# Patient Record
Sex: MALE | Race: BLACK OR AFRICAN AMERICAN | NOT HISPANIC OR LATINO | Employment: OTHER | URBAN - NONMETROPOLITAN AREA
[De-identification: names, ages, dates, MRNs, and addresses within clinical notes are randomized per-mention and may not be internally consistent; named-entity substitution may affect disease eponyms.]

---

## 2017-01-10 DIAGNOSIS — N40.1 BPH (BENIGN PROSTATIC HYPERTROPHY) WITH URINARY OBSTRUCTION: Primary | ICD-10-CM

## 2017-01-10 DIAGNOSIS — N13.8 BPH (BENIGN PROSTATIC HYPERTROPHY) WITH URINARY OBSTRUCTION: Primary | ICD-10-CM

## 2017-01-10 RX ORDER — FINASTERIDE 5 MG/1
5 TABLET, FILM COATED ORAL DAILY
Qty: 30 TABLET | Refills: 5 | Status: SHIPPED | OUTPATIENT
Start: 2017-01-10 | End: 2017-06-23 | Stop reason: SDUPTHER

## 2017-03-15 ENCOUNTER — OFFICE VISIT (OUTPATIENT)
Dept: CARDIOLOGY | Facility: CLINIC | Age: 82
End: 2017-03-15

## 2017-03-15 VITALS
SYSTOLIC BLOOD PRESSURE: 140 MMHG | HEIGHT: 71 IN | WEIGHT: 126 LBS | DIASTOLIC BLOOD PRESSURE: 82 MMHG | BODY MASS INDEX: 17.64 KG/M2 | HEART RATE: 63 BPM

## 2017-03-15 DIAGNOSIS — I10 ESSENTIAL HYPERTENSION: ICD-10-CM

## 2017-03-15 DIAGNOSIS — R01.1 CARDIAC MURMUR: ICD-10-CM

## 2017-03-15 DIAGNOSIS — I34.1 MITRAL VALVE PROLAPSE: Primary | ICD-10-CM

## 2017-03-15 DIAGNOSIS — I34.0 NON-RHEUMATIC MITRAL REGURGITATION: ICD-10-CM

## 2017-03-15 PROCEDURE — 93000 ELECTROCARDIOGRAM COMPLETE: CPT | Performed by: NURSE PRACTITIONER

## 2017-03-15 PROCEDURE — 99214 OFFICE O/P EST MOD 30 MIN: CPT | Performed by: NURSE PRACTITIONER

## 2017-03-15 NOTE — PROGRESS NOTES
Subjective:     Encounter Date:03/15/2017      Patient ID: Roman Kenney is a 82 y.o. male.  He presents today for follow up of Mitral Valve Prolapse and Mitral Valve Regurgitation.  He has a history of HTN and a murmur.  He denies CP, shortness of breath, orthopnea, dyspnea with exertion, dizziness, syncope, fatigue, palpitations, decreased exercise tolerance or swelling.  He states that his BP is managed by his PCP and is reported to be well controlled.  He states that overall he has felt well since his last visit.      Chief Complaint:  Heart Problem   This is a chronic (MVP and MR) problem. The current episode started more than 1 year ago. The problem has been unchanged. Pertinent negatives include no abdominal pain, anorexia, arthralgias, change in bowel habit, chest pain, chills, congestion, coughing, diaphoresis, fatigue, fever, headaches, joint swelling, myalgias, nausea, neck pain, numbness, rash, sore throat, swollen glands, urinary symptoms, vertigo, visual change, vomiting or weakness.   Hypertension   This is a chronic problem. The current episode started more than 1 year ago. The problem is controlled. Pertinent negatives include no anxiety, blurred vision, chest pain, headaches, malaise/fatigue, neck pain, orthopnea, palpitations, peripheral edema, PND, shortness of breath or sweats. Risk factors for coronary artery disease include male gender. Past treatments include ACE inhibitors. The current treatment provides significant improvement.   Heart Murmur   This is a chronic problem. The current episode started more than 1 year ago. The problem has been unchanged. Pertinent negatives include no abdominal pain, anorexia, arthralgias, change in bowel habit, chest pain, chills, congestion, coughing, diaphoresis, fatigue, fever, headaches, joint swelling, myalgias, nausea, neck pain, numbness, rash, sore throat, swollen glands, urinary symptoms, vertigo, visual change, vomiting or weakness.        The following portions of the patient's history were reviewed and updated as appropriate: allergies, current medications, past family history, past medical history, past social history, past surgical history and problem list.     No Known Allergies    Current Outpatient Prescriptions:   •  finasteride (PROSCAR) 5 MG tablet, Take 1 tablet by mouth Daily., Disp: 30 tablet, Rfl: 5  •  lisinopril (PRINIVIL,ZESTRIL) 10 MG tablet, TK 1 T PO D, Disp: , Rfl: 5  •  tamsulosin (FLOMAX) 0.4 MG capsule 24 hr capsule, TAKE 1 CAPSULE BY MOUTH EVERY DAY WITH THE SAME MEAL EACH DAY, Disp: 30 capsule, Rfl: 6  Past Medical History   Diagnosis Date   • BPH (benign prostatic hypertrophy)    • Hypertension    • Mitral valve regurgitation    • Undiagnosed cardiac murmurs    • Weight loss      Past Surgical History   Procedure Laterality Date   • Finger surgery Right    • Colonoscopy       Family History   Problem Relation Age of Onset   • No Known Problems Mother    • Cancer Father    • Cancer Sister    • No Known Problems Brother    • No Known Problems Sister    • No Known Problems Sister    • No Known Problems Sister      Social History     Social History   • Marital status:      Spouse name: N/A   • Number of children: N/A   • Years of education: N/A     Occupational History   • Not on file.     Social History Main Topics   • Smoking status: Never Smoker   • Smokeless tobacco: Never Used   • Alcohol use No   • Drug use: No   • Sexual activity: Defer     Other Topics Concern   • Not on file     Social History Narrative         Review of Systems   Constitution: Negative for chills, decreased appetite, diaphoresis, fatigue, fever, weakness, malaise/fatigue, night sweats, weight gain and weight loss.   HENT: Negative for congestion, headaches, nosebleeds and sore throat.    Eyes: Negative for blurred vision and visual disturbance.   Cardiovascular: Negative for chest pain, dyspnea on exertion, leg swelling, near-syncope,  "orthopnea, palpitations, paroxysmal nocturnal dyspnea and syncope.   Respiratory: Negative for cough, hemoptysis, shortness of breath, snoring and wheezing.    Endocrine: Negative for cold intolerance and heat intolerance.   Hematologic/Lymphatic: Does not bruise/bleed easily.   Skin: Negative for rash.   Musculoskeletal: Negative for arthralgias, back pain, falls, joint swelling, myalgias and neck pain.   Gastrointestinal: Negative for abdominal pain, anorexia, change in bowel habit, constipation, diarrhea, dysphagia, heartburn, nausea and vomiting.   Genitourinary: Negative for hematuria.   Neurological: Negative for dizziness, light-headedness, numbness and vertigo.   Psychiatric/Behavioral: Negative for altered mental status.   Allergic/Immunologic: Negative for persistent infections.         ECG 12 Lead  Date/Time: 3/15/2017 10:10 AM  Performed by: REGAN MADERA  Authorized by: REGAN MADERA   Comparison: compared with previous ECG from 3/7/2016  Similar to previous ECG  Rhythm: sinus bradycardia  Rate: bradycardic  BPM: 47  Clinical impression: abnormal ECG          Visit Vitals   • /82 (BP Location: Right arm, Patient Position: Sitting, Cuff Size: Adult)   • Pulse 63   • Ht 71\" (180.3 cm)   • Wt 126 lb (57.2 kg)   • BMI 17.57 kg/m2          Objective:     Physical Exam   Constitutional: He is oriented to person, place, and time. Vital signs are normal. He appears well-developed and well-nourished. No distress.   HENT:   Head: Normocephalic and atraumatic.   Right Ear: External ear normal.   Left Ear: External ear normal.   Eyes: Conjunctivae are normal. Pupils are equal, round, and reactive to light. Right eye exhibits no discharge. Left eye exhibits no discharge.   Neck: Normal range of motion. Neck supple. No JVD present. Carotid bruit is not present. No thyromegaly present.   Cardiovascular: Normal rate, regular rhythm and intact distal pulses.  PMI is not displaced.  Exam reveals no gallop, no " friction rub and no decreased pulses.    Murmur heard.  High-pitched blowing mid to late systolic murmur is present with a grade of 3/6  at the apex  Pulses:       Radial pulses are 2+ on the right side, and 2+ on the left side.        Dorsalis pedis pulses are 2+ on the right side, and 2+ on the left side.        Posterior tibial pulses are 2+ on the right side, and 2+ on the left side.   Pulmonary/Chest: Effort normal. No respiratory distress. He has decreased breath sounds in the right middle field, the right lower field, the left middle field and the left lower field. He has no wheezes. He has no rhonchi. He has no rales. He exhibits no tenderness.   Abdominal: Soft. Bowel sounds are normal. He exhibits no distension. There is no tenderness.   Musculoskeletal: Normal range of motion. He exhibits no edema.   Neurological: He is alert and oriented to person, place, and time.   Skin: Skin is warm and dry. No rash noted. He is not diaphoretic. No erythema. No pallor.   Psychiatric: He has a normal mood and affect. His behavior is normal. Judgment and thought content normal.         Assessment:          Diagnosis Plan   1. Mitral valve prolapse  Adult Transthoracic Echo Complete With Contrast in one year.    2. Non-rheumatic mitral regurgitation  Follow up with Dr. Burleson on one year following 2D echo.    3. Essential hypertension  Well controlled.    4. Cardiac murmur  Adult Transthoracic Echo Complete With Contrast          Plan:       1. Continue all medications as previously prescribed.  2. 2D echo in one year.  3. Follow up with PCP for BP management and routine lab work.  4. Report any CP, shortness of breath, dizziness, syncope, decreased exercise tolerance, palpitations, fatigue or swelling.  5. Follow up with Dr. Burleson in one year following 2D echo, or sooner if needed.

## 2017-03-22 ENCOUNTER — OFFICE VISIT (OUTPATIENT)
Dept: FAMILY MEDICINE CLINIC | Facility: CLINIC | Age: 82
End: 2017-03-22

## 2017-03-22 VITALS
WEIGHT: 121 LBS | HEIGHT: 71 IN | BODY MASS INDEX: 16.94 KG/M2 | RESPIRATION RATE: 16 BRPM | SYSTOLIC BLOOD PRESSURE: 108 MMHG | DIASTOLIC BLOOD PRESSURE: 62 MMHG | OXYGEN SATURATION: 96 % | HEART RATE: 67 BPM

## 2017-03-22 DIAGNOSIS — N40.0 BENIGN NON-NODULAR PROSTATIC HYPERPLASIA WITHOUT LOWER URINARY TRACT SYMPTOMS: ICD-10-CM

## 2017-03-22 DIAGNOSIS — I34.0 NON-RHEUMATIC MITRAL REGURGITATION: ICD-10-CM

## 2017-03-22 DIAGNOSIS — I10 ESSENTIAL HYPERTENSION: Primary | ICD-10-CM

## 2017-03-22 PROCEDURE — 99213 OFFICE O/P EST LOW 20 MIN: CPT | Performed by: FAMILY MEDICINE

## 2017-03-22 NOTE — PROGRESS NOTES
"Subjective   Roman Kenney is a 82 y.o. male.     Chief Complaint   Patient presents with   • Follow-up       History of Present Illness     he notes having good bp control..he recently saw dr ford to hve his \"heart valve\"check ed out..his urination is stable ..      Current Outpatient Prescriptions:   •  finasteride (PROSCAR) 5 MG tablet, Take 1 tablet by mouth Daily., Disp: 30 tablet, Rfl: 5  •  lisinopril (PRINIVIL,ZESTRIL) 10 MG tablet, TK 1 T PO D, Disp: , Rfl: 5  •  tamsulosin (FLOMAX) 0.4 MG capsule 24 hr capsule, TAKE 1 CAPSULE BY MOUTH EVERY DAY WITH THE SAME MEAL EACH DAY, Disp: 30 capsule, Rfl: 6  No Known Allergies    Past Medical History:   Diagnosis Date   • BPH (benign prostatic hypertrophy)    • Hypertension    • Mitral valve regurgitation    • Undiagnosed cardiac murmurs    • Weight loss      Past Surgical History:   Procedure Laterality Date   • COLONOSCOPY     • FINGER SURGERY Right        Review of Systems   Constitutional: Negative.    Eyes: Negative.    Respiratory: Negative.    Cardiovascular: Negative.    Gastrointestinal: Negative.    Endocrine: Negative.    Genitourinary: Positive for decreased urine volume.   Musculoskeletal: Negative.    Skin: Negative.    Allergic/Immunologic: Negative.    Neurological: Negative.    Hematological: Negative.        Objective  /62  Pulse 67  Resp 16  Ht 71\" (180.3 cm)  Wt 121 lb (54.9 kg)  SpO2 96%  BMI 16.88 kg/m2  Physical Exam   Constitutional: He is oriented to person, place, and time. He appears well-developed and well-nourished.   HENT:   Head: Normocephalic and atraumatic.   Right Ear: External ear normal.   Left Ear: External ear normal.   Nose: Nose normal.   Mouth/Throat: Oropharynx is clear and moist.   Eyes: Conjunctivae and EOM are normal. Pupils are equal, round, and reactive to light.   Neck: Normal range of motion. Neck supple.   Cardiovascular: Normal rate, regular rhythm and intact distal pulses.    Murmur " heard.  Pulmonary/Chest: Effort normal and breath sounds normal.   Abdominal: Soft. Bowel sounds are normal.   Musculoskeletal: Normal range of motion.   Neurological: He is alert and oriented to person, place, and time. He has normal reflexes.   Skin: Skin is warm and dry.   Psychiatric: He has a normal mood and affect. His behavior is normal. Judgment and thought content normal.   Nursing note and vitals reviewed.      Assessment/Plan   Roman was seen today for follow-up.    Diagnoses and all orders for this visit:    Essential hypertension  -     CBC w AUTO Differential  -     Comprehensive Metabolic Panel    Benign non-nodular prostatic hyperplasia without lower urinary tract symptoms    Non-rheumatic mitral regurgitation                  Orders Placed This Encounter   Procedures   • Comprehensive Metabolic Panel       Follow up: 6 month(s)

## 2017-03-23 LAB
ALBUMIN SERPL-MCNC: 4.1 G/DL (ref 3.5–5)
ALBUMIN/GLOB SERPL: 1.3 G/DL (ref 1.1–2.5)
ALP SERPL-CCNC: 107 U/L (ref 24–120)
ALT SERPL-CCNC: 24 U/L (ref 0–54)
AST SERPL-CCNC: 24 U/L (ref 7–45)
BASOPHILS # BLD AUTO: 0.03 10*3/MM3 (ref 0–0.2)
BASOPHILS NFR BLD AUTO: 0.4 % (ref 0–2)
BILIRUB SERPL-MCNC: 0.6 MG/DL (ref 0.1–1)
BUN SERPL-MCNC: 19 MG/DL (ref 5–21)
BUN/CREAT SERPL: 13.8 (ref 7–25)
CALCIUM SERPL-MCNC: 9.9 MG/DL (ref 8.4–10.4)
CHLORIDE SERPL-SCNC: 106 MMOL/L (ref 98–110)
CO2 SERPL-SCNC: 27 MMOL/L (ref 24–31)
CREAT SERPL-MCNC: 1.38 MG/DL (ref 0.5–1.4)
EOSINOPHIL # BLD AUTO: 0.17 10*3/MM3 (ref 0–0.7)
EOSINOPHIL NFR BLD AUTO: 2.3 % (ref 0–4)
ERYTHROCYTE [DISTWIDTH] IN BLOOD BY AUTOMATED COUNT: 14.3 % (ref 12–15)
GLOBULIN SER CALC-MCNC: 3.2 GM/DL
GLUCOSE SERPL-MCNC: 87 MG/DL (ref 70–100)
HCT VFR BLD AUTO: 38.3 % (ref 40–52)
HGB BLD-MCNC: 12.2 G/DL (ref 14–18)
IMM GRANULOCYTES # BLD: 0.02 10*3/MM3 (ref 0–0.03)
IMM GRANULOCYTES NFR BLD: 0.3 % (ref 0–5)
LYMPHOCYTES # BLD AUTO: 2.21 10*3/MM3 (ref 0.72–4.86)
LYMPHOCYTES NFR BLD AUTO: 30.4 % (ref 15–45)
MCH RBC QN AUTO: 28.2 PG (ref 28–32)
MCHC RBC AUTO-ENTMCNC: 31.9 G/DL (ref 33–36)
MCV RBC AUTO: 88.7 FL (ref 82–95)
MONOCYTES # BLD AUTO: 0.61 10*3/MM3 (ref 0.19–1.3)
MONOCYTES NFR BLD AUTO: 8.4 % (ref 4–12)
NEUTROPHILS # BLD AUTO: 4.22 10*3/MM3 (ref 1.87–8.4)
NEUTROPHILS NFR BLD AUTO: 58.2 % (ref 39–78)
PLATELET # BLD AUTO: 190 10*3/MM3 (ref 130–400)
POTASSIUM SERPL-SCNC: 4.5 MMOL/L (ref 3.5–5.3)
PROT SERPL-MCNC: 7.3 G/DL (ref 6.3–8.7)
RBC # BLD AUTO: 4.32 10*6/MM3 (ref 4.8–5.9)
SODIUM SERPL-SCNC: 146 MMOL/L (ref 135–145)
WBC # BLD AUTO: 7.26 10*3/MM3 (ref 4.8–10.8)

## 2017-05-01 RX ORDER — LISINOPRIL 10 MG/1
TABLET ORAL
Qty: 90 TABLET | Refills: 3 | Status: SHIPPED | OUTPATIENT
Start: 2017-05-01 | End: 2018-04-22 | Stop reason: SDUPTHER

## 2017-05-19 DIAGNOSIS — N40.1 BPH (BENIGN PROSTATIC HYPERTROPHY) WITH URINARY OBSTRUCTION: ICD-10-CM

## 2017-05-19 DIAGNOSIS — N13.8 BPH (BENIGN PROSTATIC HYPERTROPHY) WITH URINARY OBSTRUCTION: ICD-10-CM

## 2017-05-19 RX ORDER — TAMSULOSIN HYDROCHLORIDE 0.4 MG/1
CAPSULE ORAL
Qty: 30 CAPSULE | Refills: 0 | Status: SHIPPED | OUTPATIENT
Start: 2017-05-19 | End: 2017-06-23 | Stop reason: SDUPTHER

## 2017-06-23 ENCOUNTER — OFFICE VISIT (OUTPATIENT)
Dept: UROLOGY | Facility: CLINIC | Age: 82
End: 2017-06-23

## 2017-06-23 VITALS
HEIGHT: 71 IN | DIASTOLIC BLOOD PRESSURE: 66 MMHG | WEIGHT: 120 LBS | SYSTOLIC BLOOD PRESSURE: 115 MMHG | BODY MASS INDEX: 16.8 KG/M2 | TEMPERATURE: 98.9 F

## 2017-06-23 DIAGNOSIS — N13.8 BPH (BENIGN PROSTATIC HYPERTROPHY) WITH URINARY OBSTRUCTION: ICD-10-CM

## 2017-06-23 DIAGNOSIS — N40.1 BPH (BENIGN PROSTATIC HYPERTROPHY) WITH URINARY OBSTRUCTION: ICD-10-CM

## 2017-06-23 DIAGNOSIS — N40.0 BENIGN NON-NODULAR PROSTATIC HYPERPLASIA WITHOUT LOWER URINARY TRACT SYMPTOMS: Primary | ICD-10-CM

## 2017-06-23 DIAGNOSIS — R33.9 RETENTION, URINE: ICD-10-CM

## 2017-06-23 PROCEDURE — 99213 OFFICE O/P EST LOW 20 MIN: CPT | Performed by: UROLOGY

## 2017-06-23 PROCEDURE — 51798 US URINE CAPACITY MEASURE: CPT | Performed by: UROLOGY

## 2017-06-23 RX ORDER — FINASTERIDE 5 MG/1
5 TABLET, FILM COATED ORAL DAILY
Qty: 90 TABLET | Refills: 3 | Status: SHIPPED | OUTPATIENT
Start: 2017-06-23 | End: 2018-07-13 | Stop reason: SDUPTHER

## 2017-06-23 RX ORDER — TAMSULOSIN HYDROCHLORIDE 0.4 MG/1
1 CAPSULE ORAL DAILY
Qty: 90 CAPSULE | Refills: 3 | Status: SHIPPED | OUTPATIENT
Start: 2017-06-23 | End: 2018-07-13 | Stop reason: SDUPTHER

## 2017-06-23 NOTE — PROGRESS NOTES
Subjective    Mr. Kenney is 82 y.o. male    Chief Complaint: BPH    History of Present Illness     Benign Prostatic Hypertrophy  Patient complains of lower urinary tract symptoms. He reports frequency, incomplete emptying, intermittency, nocturia one time a night and weak stream. He denies straining and urgency. Patient states symptoms are of mild severity. Onset of symptoms was several years ago and was gradual in onset. His AUA Symptom Score is, 10/35.He reports a history of no complicating symptoms. He denies flank pain, gross hematuria, kidney stones and recurrent UTI.  Patient has tried Alpha blockers and 5 alpha reductase inhibitors with improvement. Last PSA was NA .            The following portions of the patient's history were reviewed and updated as appropriate: allergies, current medications, past family history, past medical history, past social history, past surgical history and problem list.    Review of Systems   Constitutional: Negative for chills and fever.   Gastrointestinal: Negative for abdominal pain, anal bleeding and blood in stool.   Genitourinary: Negative for difficulty urinating, flank pain, frequency, hematuria and urgency.         Current Outpatient Prescriptions:   •  finasteride (PROSCAR) 5 MG tablet, Take 1 tablet by mouth Daily., Disp: 90 tablet, Rfl: 3  •  lisinopril (PRINIVIL,ZESTRIL) 10 MG tablet, TAKE 1 TABLET BY MOUTH DAILY, Disp: 90 tablet, Rfl: 3  •  tamsulosin (FLOMAX) 0.4 MG capsule 24 hr capsule, Take 1 capsule by mouth Daily., Disp: 90 capsule, Rfl: 3    Past Medical History:   Diagnosis Date   • BPH (benign prostatic hypertrophy)    • Epididymitis    • Hypertension    • Hypertension, essential    • Mitral valve regurgitation    • Undiagnosed cardiac murmurs    • Weight loss        Past Surgical History:   Procedure Laterality Date   • COLONOSCOPY      Colon Polyp Removal    • FINGER SURGERY Right     tumor removed from a finger       Social History     Social  "History   • Marital status:      Spouse name: N/A   • Number of children: N/A   • Years of education: N/A     Social History Main Topics   • Smoking status: Never Smoker   • Smokeless tobacco: Never Used   • Alcohol use No   • Drug use: No   • Sexual activity: Defer     Other Topics Concern   • None     Social History Narrative       Family History   Problem Relation Age of Onset   • No Known Problems Mother    • Cancer Father    • Cancer Sister    • No Known Problems Brother    • No Known Problems Sister    • No Known Problems Sister    • No Known Problems Sister        Objective    /66  Temp 98.9 °F (37.2 °C)  Ht 71\" (180.3 cm)  Wt 120 lb (54.4 kg)  BMI 16.74 kg/m2    Physical Exam   Constitutional: He is oriented to person, place, and time. He appears well-developed and well-nourished.   Pulmonary/Chest: Effort normal.   Abdominal: Soft. He exhibits no distension and no mass. There is no tenderness. There is no rebound and no guarding. No hernia.   Genitourinary: Penis normal. Rectal exam shows no mass, no tenderness and anal tone normal. Enlarged: for the age of the patient. Right testis shows no mass, no swelling and no tenderness. Left testis shows no mass, no swelling and no tenderness. No hypospadias. No discharge found.   Genitourinary Comments:  The urethral meatus normal in position without evidence of stricture. Epididymis without mass or tenderness. Vas Deferens is palpably normal.Anus and perineum without mass or tenderness. The prostate is approximately 50 ml. It is Symmetric, with a Soft consistency. There are no nodules present. . The seminal vesicles are Not palpable due to the size of the prostate.     Neurological: He is alert and oriented to person, place, and time.   Vitals reviewed.          Results for orders placed or performed in visit on 03/22/17   Comprehensive Metabolic Panel   Result Value Ref Range    Glucose 87 70 - 100 mg/dL    BUN 19 5 - 21 mg/dL    Creatinine 1.38 " 0.50 - 1.40 mg/dL    eGFR Non African Am 49 (L) >60 mL/min/1.73    eGFR African Am 60 (L) >60 mL/min/1.73    BUN/Creatinine Ratio 13.8 7.0 - 25.0    Sodium 146 (H) 135 - 145 mmol/L    Potassium 4.5 3.5 - 5.3 mmol/L    Chloride 106 98 - 110 mmol/L    Total CO2 27.0 24.0 - 31.0 mmol/L    Calcium 9.9 8.4 - 10.4 mg/dL    Total Protein 7.3 6.3 - 8.7 g/dL    Albumin 4.10 3.50 - 5.00 g/dL    Globulin 3.2 gm/dL    A/G Ratio 1.3 1.1 - 2.5 g/dL    Total Bilirubin 0.6 0.1 - 1.0 mg/dL    Alkaline Phosphatase 107 24 - 120 U/L    AST (SGOT) 24 7 - 45 U/L    ALT (SGPT) 24 0 - 54 U/L   CBC w AUTO Differential   Result Value Ref Range    WBC 7.26 4.80 - 10.80 10*3/mm3    RBC 4.32 (L) 4.80 - 5.90 10*6/mm3    Hemoglobin 12.2 (L) 14.0 - 18.0 g/dL    Hematocrit 38.3 (L) 40.0 - 52.0 %    MCV 88.7 82.0 - 95.0 fL    MCH 28.2 28.0 - 32.0 pg    MCHC 31.9 (L) 33.0 - 36.0 g/dL    RDW 14.3 12.0 - 15.0 %    Platelets 190 130 - 400 10*3/mm3    Neutrophil Rel % 58.2 39.0 - 78.0 %    Lymphocyte Rel % 30.4 15.0 - 45.0 %    Monocyte Rel % 8.4 4.0 - 12.0 %    Eosinophil Rel % 2.3 0.0 - 4.0 %    Basophil Rel % 0.4 0.0 - 2.0 %    Neutrophils Absolute 4.22 1.87 - 8.40 10*3/mm3    Lymphocytes Absolute 2.21 0.72 - 4.86 10*3/mm3    Monocytes Absolute 0.61 0.19 - 1.30 10*3/mm3    Eosinophils Absolute 0.17 0.00 - 0.70 10*3/mm3    Basophils Absolute 0.03 0.00 - 0.20 10*3/mm3    Immature Granulocyte Rel % 0.3 0.0 - 5.0 %    Immature Grans Absolute 0.02 0.00 - 0.03 10*3/mm3     Assessment and Plan    Roman was seen today for benign prostatic hypertrophy.    Diagnoses and all orders for this visit:    Benign non-nodular prostatic hyperplasia without lower urinary tract symptoms    BPH (benign prostatic hypertrophy) with urinary obstruction  -     tamsulosin (FLOMAX) 0.4 MG capsule 24 hr capsule; Take 1 capsule by mouth Daily.  -     finasteride (PROSCAR) 5 MG tablet; Take 1 tablet by mouth Daily.                Estimation of residual urine via abdominal  ultrasound  Residual Urine: 398 ml  Indication: bph  Position: Supine  Examination: Incremental scanning of the suprapubic area using 3 MHz transducer using copious amounts of acoustic gel.   Findings: An anechoic area was demonstrated which represented the bladder, with measurement of residual urine as noted. I inspected this myself. In that the residual urine was stable or insignificant, no treatment will be necessary at this time.       Patient doing well from a voiding standpoint he is on dual therapy we will continue this.

## 2017-09-19 ENCOUNTER — OFFICE VISIT (OUTPATIENT)
Dept: FAMILY MEDICINE CLINIC | Facility: CLINIC | Age: 82
End: 2017-09-19

## 2017-09-19 VITALS
TEMPERATURE: 97.1 F | RESPIRATION RATE: 16 BRPM | SYSTOLIC BLOOD PRESSURE: 110 MMHG | OXYGEN SATURATION: 97 % | WEIGHT: 116 LBS | HEIGHT: 71 IN | HEART RATE: 80 BPM | DIASTOLIC BLOOD PRESSURE: 62 MMHG | BODY MASS INDEX: 16.24 KG/M2

## 2017-09-19 DIAGNOSIS — N40.0 BENIGN NON-NODULAR PROSTATIC HYPERPLASIA WITHOUT LOWER URINARY TRACT SYMPTOMS: ICD-10-CM

## 2017-09-19 DIAGNOSIS — I10 ESSENTIAL HYPERTENSION: Primary | ICD-10-CM

## 2017-09-19 DIAGNOSIS — I34.0 NON-RHEUMATIC MITRAL REGURGITATION: ICD-10-CM

## 2017-09-19 PROCEDURE — 99213 OFFICE O/P EST LOW 20 MIN: CPT | Performed by: FAMILY MEDICINE

## 2017-09-19 NOTE — PROGRESS NOTES
"Subjective   Roman Kenney is a 82 y.o. male.     Chief Complaint   Patient presents with   • Follow-up        History of Present Illness     He is here today with  his wife--she doesnt think he is eating well --just eating junk foot--he has lost weight--he notes urinary symtp;oms are stable--he noted good bp control withotu cp or ha--he is seeing dr vallejo for MV regurg      Current Outpatient Prescriptions:   •  finasteride (PROSCAR) 5 MG tablet, Take 1 tablet by mouth Daily., Disp: 90 tablet, Rfl: 3  •  lisinopril (PRINIVIL,ZESTRIL) 10 MG tablet, TAKE 1 TABLET BY MOUTH DAILY, Disp: 90 tablet, Rfl: 3  •  tamsulosin (FLOMAX) 0.4 MG capsule 24 hr capsule, Take 1 capsule by mouth Daily., Disp: 90 capsule, Rfl: 3  No Known Allergies    Past Medical History:   Diagnosis Date   • BPH (benign prostatic hypertrophy)    • Epididymitis    • Hypertension    • Hypertension, essential    • Mitral valve regurgitation    • Undiagnosed cardiac murmurs    • Weight loss      Past Surgical History:   Procedure Laterality Date   • COLONOSCOPY      Colon Polyp Removal    • FINGER SURGERY Right     tumor removed from a finger       Review of Systems   Constitutional: Negative.    HENT: Negative.    Eyes: Negative.    Respiratory: Negative.    Cardiovascular: Negative.    Gastrointestinal: Negative.    Endocrine: Negative.    Genitourinary: Negative.    Musculoskeletal: Negative.    Skin: Negative.    Allergic/Immunologic: Negative.    Neurological: Negative.    Hematological: Negative.    Psychiatric/Behavioral: Negative.        Objective  /62  Pulse 80  Temp 97.1 °F (36.2 °C)  Resp 16  Ht 71\" (180.3 cm)  Wt 116 lb (52.6 kg)  SpO2 97%  BMI 16.18 kg/m2  Physical Exam   Constitutional: He is oriented to person, place, and time. He appears well-developed and well-nourished.   HENT:   Head: Normocephalic and atraumatic.   Right Ear: External ear normal.   Left Ear: External ear normal.   Nose: Nose normal.   Mouth/Throat: " Oropharynx is clear and moist.   Eyes: Conjunctivae and EOM are normal. Pupils are equal, round, and reactive to light.   Neck: Normal range of motion. Neck supple.   Cardiovascular: Normal rate and intact distal pulses.    Murmur heard.  Pulmonary/Chest: Effort normal and breath sounds normal.   Abdominal: Soft. Bowel sounds are normal.   Musculoskeletal: Normal range of motion.   Neurological: He is alert and oriented to person, place, and time. He has normal reflexes.   Skin: Skin is warm and dry.   Psychiatric: He has a normal mood and affect. His behavior is normal. Judgment and thought content normal.   Nursing note and vitals reviewed.      Assessment/Plan   Roman was seen today for follow-up.    Diagnoses and all orders for this visit:    Essential hypertension  -     CBC w AUTO Differential  -     Comprehensive Metabolic Panel  -     TSH  -     T4, free  -     Urinalysis    Non-rheumatic mitral regurgitation    Benign non-nodular prostatic hyperplasia without lower urinary tract symptoms  -     PSA                 Orders Placed This Encounter   Procedures   • PSA   • Comprehensive Metabolic Panel   • TSH   • T4, free   • Urinalysis   • CBC w AUTO Differential     Order Specific Question:   Manual Differential     Answer:   No       Follow up: 6 month(s)

## 2017-09-20 LAB
ALBUMIN SERPL-MCNC: 4.5 G/DL (ref 3.5–5)
ALBUMIN/GLOB SERPL: 1.6 G/DL (ref 1.1–2.5)
ALP SERPL-CCNC: 97 U/L (ref 24–120)
ALT SERPL-CCNC: 27 U/L (ref 0–54)
APPEARANCE UR: ABNORMAL
AST SERPL-CCNC: 25 U/L (ref 7–45)
BASOPHILS # BLD AUTO: 0.02 10*3/MM3 (ref 0–0.2)
BASOPHILS NFR BLD AUTO: 0.3 % (ref 0–2)
BILIRUB SERPL-MCNC: 0.8 MG/DL (ref 0.1–1)
BILIRUB UR QL STRIP: NEGATIVE
BUN SERPL-MCNC: 26 MG/DL (ref 5–21)
BUN/CREAT SERPL: 16.9 (ref 7–25)
CALCIUM SERPL-MCNC: 9.9 MG/DL (ref 8.4–10.4)
CHLORIDE SERPL-SCNC: 105 MMOL/L (ref 98–110)
CO2 SERPL-SCNC: 27 MMOL/L (ref 24–31)
COLOR UR: YELLOW
CREAT SERPL-MCNC: 1.54 MG/DL (ref 0.5–1.4)
EOSINOPHIL # BLD AUTO: 0.09 10*3/MM3 (ref 0–0.7)
EOSINOPHIL NFR BLD AUTO: 1.5 % (ref 0–4)
ERYTHROCYTE [DISTWIDTH] IN BLOOD BY AUTOMATED COUNT: 14.2 % (ref 12–15)
GLOBULIN SER CALC-MCNC: 2.9 GM/DL
GLUCOSE SERPL-MCNC: 92 MG/DL (ref 70–100)
GLUCOSE UR QL: NEGATIVE
HCT VFR BLD AUTO: 36.4 % (ref 40–52)
HGB BLD-MCNC: 11.7 G/DL (ref 14–18)
HGB UR QL STRIP: ABNORMAL
IMM GRANULOCYTES # BLD: 0.01 10*3/MM3 (ref 0–0.03)
IMM GRANULOCYTES NFR BLD: 0.2 % (ref 0–5)
KETONES UR QL STRIP: NEGATIVE
LEUKOCYTE ESTERASE UR QL STRIP: ABNORMAL
LYMPHOCYTES # BLD AUTO: 2.09 10*3/MM3 (ref 0.72–4.86)
LYMPHOCYTES NFR BLD AUTO: 35.7 % (ref 15–45)
MCH RBC QN AUTO: 29.1 PG (ref 28–32)
MCHC RBC AUTO-ENTMCNC: 32.1 G/DL (ref 33–36)
MCV RBC AUTO: 90.5 FL (ref 82–95)
MONOCYTES # BLD AUTO: 0.48 10*3/MM3 (ref 0.19–1.3)
MONOCYTES NFR BLD AUTO: 8.2 % (ref 4–12)
NEUTROPHILS # BLD AUTO: 3.16 10*3/MM3 (ref 1.87–8.4)
NEUTROPHILS NFR BLD AUTO: 54.1 % (ref 39–78)
NITRITE UR QL STRIP: NEGATIVE
PH UR STRIP: 6 [PH] (ref 5–8)
PLATELET # BLD AUTO: 188 10*3/MM3 (ref 130–400)
POTASSIUM SERPL-SCNC: 4.8 MMOL/L (ref 3.5–5.3)
PROT SERPL-MCNC: 7.4 G/DL (ref 6.3–8.7)
PROT UR QL STRIP: ABNORMAL
PSA SERPL-MCNC: 4.09 NG/ML (ref 0–4)
RBC # BLD AUTO: 4.02 10*6/MM3 (ref 4.8–5.9)
SODIUM SERPL-SCNC: 141 MMOL/L (ref 135–145)
SP GR UR: 1.02 (ref 1–1.03)
T4 FREE SERPL-MCNC: 1.19 NG/DL (ref 0.78–2.19)
TSH SERPL DL<=0.005 MIU/L-ACNC: 0.74 MIU/ML (ref 0.47–4.68)
UROBILINOGEN UR STRIP-MCNC: ABNORMAL MG/DL
WBC # BLD AUTO: 5.85 10*3/MM3 (ref 4.8–10.8)

## 2017-09-21 DIAGNOSIS — R94.4 DECREASED GFR: ICD-10-CM

## 2017-09-21 DIAGNOSIS — D64.9 ANEMIA, UNSPECIFIED TYPE: Primary | ICD-10-CM

## 2017-09-21 NOTE — PROGRESS NOTES
Wife informed of this. To come for extra labs and hemoccults. To get a renal u/s. Orders put in for labs and order put in for Renal u/s for [facility]

## 2017-09-22 ENCOUNTER — HOSPITAL ENCOUNTER (OUTPATIENT)
Dept: ULTRASOUND IMAGING | Facility: HOSPITAL | Age: 82
Discharge: HOME | End: 2017-09-22
Attending: FAMILY MEDICINE | Admitting: FAMILY MEDICINE

## 2017-09-22 PROCEDURE — 76775 US EXAM ABDO BACK WALL LIM: CPT

## 2017-09-23 LAB
FERRITIN SERPL-MCNC: 254 NG/ML (ref 17.9–464)
IRON SERPL-MCNC: 81 MCG/DL (ref 42–180)
VIT B12 SERPL-MCNC: 286 PG/ML (ref 239–931)

## 2017-09-25 ENCOUNTER — TELEPHONE (OUTPATIENT)
Dept: FAMILY MEDICINE CLINIC | Facility: CLINIC | Age: 82
End: 2017-09-25

## 2017-09-25 ENCOUNTER — RESULTS ENCOUNTER (OUTPATIENT)
Dept: FAMILY MEDICINE CLINIC | Facility: CLINIC | Age: 82
End: 2017-09-25

## 2017-09-25 DIAGNOSIS — N40.0 BENIGN PROSTATIC HYPERPLASIA, PRESENCE OF LOWER URINARY TRACT SYMPTOMS UNSPECIFIED, UNSPECIFIED MORPHOLOGY: Primary | ICD-10-CM

## 2017-09-25 DIAGNOSIS — N28.89 RENAL MASS: ICD-10-CM

## 2017-09-25 DIAGNOSIS — R94.4 DECREASED GFR: ICD-10-CM

## 2017-09-25 DIAGNOSIS — D64.9 ANEMIA, UNSPECIFIED TYPE: ICD-10-CM

## 2017-09-25 NOTE — TELEPHONE ENCOUNTER
----- Message from Lou Velarde LPN sent at 9/22/2017  4:27 PM CDT -----  Wife informed of this. She says he sees Dr. Cha. She thinks his appt is not until next year though      Marjorie --lets see if we can get him in the next few weeks or so--referral done

## 2017-09-27 ENCOUNTER — CLINICAL SUPPORT (OUTPATIENT)
Dept: FAMILY MEDICINE CLINIC | Facility: CLINIC | Age: 82
End: 2017-09-27

## 2017-09-27 DIAGNOSIS — D64.9 ANEMIA, UNSPECIFIED TYPE: Primary | ICD-10-CM

## 2017-09-27 PROCEDURE — G0328 FECAL BLOOD SCRN IMMUNOASSAY: HCPCS | Performed by: FAMILY MEDICINE

## 2017-09-28 ENCOUNTER — OFFICE VISIT (OUTPATIENT)
Dept: UROLOGY | Facility: CLINIC | Age: 82
End: 2017-09-28

## 2017-09-28 VITALS
DIASTOLIC BLOOD PRESSURE: 70 MMHG | SYSTOLIC BLOOD PRESSURE: 112 MMHG | TEMPERATURE: 98.6 F | BODY MASS INDEX: 16.15 KG/M2 | WEIGHT: 115.4 LBS | HEIGHT: 71 IN

## 2017-09-28 DIAGNOSIS — N28.1 RENAL CYST: ICD-10-CM

## 2017-09-28 DIAGNOSIS — R31.29 HEMATURIA, MICROSCOPIC: ICD-10-CM

## 2017-09-28 DIAGNOSIS — N40.0 BENIGN NON-NODULAR PROSTATIC HYPERPLASIA WITHOUT LOWER URINARY TRACT SYMPTOMS: Primary | ICD-10-CM

## 2017-09-28 LAB
BILIRUB BLD-MCNC: NEGATIVE MG/DL
CLARITY, POC: CLEAR
COLOR UR: YELLOW
GLUCOSE UR STRIP-MCNC: NEGATIVE MG/DL
HEMOCCULT STL QL IA: NEGATIVE
KETONES UR QL: NEGATIVE
LEUKOCYTE EST, POC: ABNORMAL
NITRITE UR-MCNC: NEGATIVE MG/ML
PH UR: 5.5 [PH] (ref 5–8)
PROT UR STRIP-MCNC: ABNORMAL MG/DL
RBC # UR STRIP: ABNORMAL /UL
SP GR UR: 1.01 (ref 1–1.03)
UROBILINOGEN UR QL: NORMAL

## 2017-09-28 PROCEDURE — 81003 URINALYSIS AUTO W/O SCOPE: CPT | Performed by: UROLOGY

## 2017-09-28 PROCEDURE — 88112 CYTOPATH CELL ENHANCE TECH: CPT | Performed by: UROLOGY

## 2017-09-28 PROCEDURE — 99214 OFFICE O/P EST MOD 30 MIN: CPT | Performed by: UROLOGY

## 2017-09-28 NOTE — PROGRESS NOTES
Subjective    Mr. Kenney is 82 y.o. male    Chief Complaint: BPH/Renal Mass    History of Present Illness     Benign Prostatic Hypertrophy  Patient complains of lower urinary tract symptoms. He reports frequency, incomplete emptying, intermittency, nocturia one time a night and weak stream. He denies straining and urgency. Patient states symptoms are of mild severity. Onset of symptoms was several years ago and was gradual in onset. His AUA Symptom Score is, 10/35.He reports a history of no complicating symptoms. He denies flank pain, gross hematuria, kidney stones and recurrent UTI.  Patient has tried Alpha blockers and 5 alpha reductase inhibitors with improvement. Last PSA was NA .    Renal Mass  Patient here for evaluation of renal mass.  The renal mass was found incidentally.  The location of the mass is the right kidney.  The mass has been present for1 week.  The mass is described as complex.  The size of the mass is 1.5cm.  It would be classifed as a Bosniak II/IIF.  The mass was found on evaluation of decreased GFR.  Associated symptoms include none.    The following portions of the patient's history were reviewed and updated as appropriate: allergies, current medications, past family history, past medical history, past social history, past surgical history and problem list.    Review of Systems   Constitutional: Negative for chills and fever.   Gastrointestinal: Negative for abdominal pain, anal bleeding and blood in stool.   Genitourinary: Negative for difficulty urinating, flank pain, frequency, hematuria and urgency.         Current Outpatient Prescriptions:   •  finasteride (PROSCAR) 5 MG tablet, Take 1 tablet by mouth Daily., Disp: 90 tablet, Rfl: 3  •  lisinopril (PRINIVIL,ZESTRIL) 10 MG tablet, TAKE 1 TABLET BY MOUTH DAILY, Disp: 90 tablet, Rfl: 3  •  tamsulosin (FLOMAX) 0.4 MG capsule 24 hr capsule, Take 1 capsule by mouth Daily., Disp: 90 capsule, Rfl: 3    Past Medical History:   Diagnosis  "Date   • BPH (benign prostatic hypertrophy)    • Epididymitis    • Hypertension    • Hypertension, essential    • Mitral valve regurgitation    • Undiagnosed cardiac murmurs    • Weight loss        Past Surgical History:   Procedure Laterality Date   • COLONOSCOPY      Colon Polyp Removal    • FINGER SURGERY Right     tumor removed from a finger       Social History     Social History   • Marital status:      Spouse name: N/A   • Number of children: N/A   • Years of education: N/A     Social History Main Topics   • Smoking status: Never Smoker   • Smokeless tobacco: Never Used   • Alcohol use No   • Drug use: No   • Sexual activity: Defer     Other Topics Concern   • None     Social History Narrative       Family History   Problem Relation Age of Onset   • No Known Problems Mother    • Cancer Father    • Cancer Sister    • No Known Problems Brother    • No Known Problems Sister    • No Known Problems Sister    • No Known Problems Sister        Objective    /70  Temp 98.6 °F (37 °C)  Ht 71\" (180.3 cm)  Wt 115 lb 6.4 oz (52.3 kg)  BMI 16.1 kg/m2    Physical Exam   Constitutional: He is oriented to person, place, and time. He appears well-developed and well-nourished. No distress.   Pulmonary/Chest: Effort normal.   Abdominal: Soft. He exhibits no distension and no mass. There is no tenderness. There is no rebound and no guarding. No hernia.   Neurological: He is alert and oriented to person, place, and time.   Skin: Skin is warm and dry. He is not diaphoretic.   Psychiatric: He has a normal mood and affect.   Vitals reviewed.        Renal ultrasound independent review    The renal ultrasound is available for me to review.  Treatment recommendations require an independent review.  This film has been reviewed by the radiologist to determine any non urologic abnormalities that are presents.  However, I very closely inspected the kidneys for size, symmetry, contour, parenchymal thickness, perinephric " reaction, presence of calcifications, and intrarenal dilation of the collecting system.       The right kidney appears multiple renal cysts, concerning cyst right upper pole 1.5cm    The left kidney appears multiple renal cysts    The bladder appears prostate creating mass effect.         Results for orders placed or performed in visit on 09/28/17   POC Urinalysis Dipstick, Automated   Result Value Ref Range    Color Yellow Yellow, Straw, Dark Yellow, Florence    Clarity, UA Clear Clear    Glucose, UA Negative Negative, 1000 mg/dL (3+) mg/dL    Bilirubin Negative Negative    Ketones, UA Negative Negative    Specific Gravity  1.015 1.005 - 1.030    Blood, UA Small (A) Negative    pH, Urine 5.5 5.0 - 8.0    Protein, POC 30 mg/dL (A) Negative mg/dL    Urobilinogen, UA Normal Normal    Leukocytes Moderate (2+) (A) Negative    Nitrite, UA Negative Negative   Microscopic Urinalysis  I inspected the urine myself based on the clinical situation including the dipstick urine. The urine is spun in a centrifuge for three minutes. The spun urine shows 12-20 rbc/hpf, none wbc/hpf, none epi/hpf, negative bacteria, negative crystals, and negative casts.     Assessment and Plan    Roman was seen today for benign prostatic hypertrophy.    Diagnoses and all orders for this visit:    Benign non-nodular prostatic hyperplasia without lower urinary tract symptoms  -     POC Urinalysis Dipstick, Automated    Renal cyst  -     MRI Abdomen With & Without Contrast; Future    Hematuria, microscopic  -     Non-gynecologic Cytology - Body Fluid, Urine, Clean Catch; Future      I reviewed his renal ultrasound with the findings mentioned above.  I think the best thing to do given his decreasing GFR recently is to get an MRI of his kidneys to fully evaluate this cyst.  I will send today's urine for cytology given his microscopic hematuria is slightly source of his enlarged prostate.  He will follow-up with me next week with these results.

## 2017-10-03 LAB
CYTO UR: NORMAL
LAB AP CASE REPORT: NORMAL
Lab: NORMAL
PATH REPORT.FINAL DX SPEC: NORMAL
PATH REPORT.GROSS SPEC: NORMAL

## 2017-10-05 ENCOUNTER — HOSPITAL ENCOUNTER (OUTPATIENT)
Dept: MRI IMAGING | Facility: HOSPITAL | Age: 82
Discharge: HOME | End: 2017-10-05
Attending: UROLOGY | Admitting: UROLOGY

## 2017-10-05 ENCOUNTER — OFFICE VISIT (OUTPATIENT)
Dept: UROLOGY | Facility: CLINIC | Age: 82
End: 2017-10-05

## 2017-10-05 VITALS — BODY MASS INDEX: 16.07 KG/M2 | HEIGHT: 71 IN | TEMPERATURE: 97.4 F | WEIGHT: 114.8 LBS

## 2017-10-05 DIAGNOSIS — N28.1 RENAL CYST: ICD-10-CM

## 2017-10-05 DIAGNOSIS — N40.0 BENIGN NON-NODULAR PROSTATIC HYPERPLASIA WITHOUT LOWER URINARY TRACT SYMPTOMS: Primary | ICD-10-CM

## 2017-10-05 DIAGNOSIS — R33.9 RETENTION, URINE: ICD-10-CM

## 2017-10-05 DIAGNOSIS — R31.29 HEMATURIA, MICROSCOPIC: ICD-10-CM

## 2017-10-05 LAB — CREAT BLDA-MCNC: 1.4 MG/DL (ref 0.6–1.3)

## 2017-10-05 PROCEDURE — 99214 OFFICE O/P EST MOD 30 MIN: CPT | Performed by: UROLOGY

## 2017-10-05 PROCEDURE — 74183 MRI ABD W/O CNTR FLWD CNTR: CPT

## 2017-10-05 PROCEDURE — A9577 INJ MULTIHANCE: HCPCS | Performed by: UROLOGY

## 2017-10-05 PROCEDURE — 0 GADOBENATE DIMEGLUMINE 529 MG/ML SOLUTION: Performed by: UROLOGY

## 2017-10-05 PROCEDURE — 82565 ASSAY OF CREATININE: CPT

## 2017-10-05 RX ADMIN — GADOBENATE DIMEGLUMINE 10 ML: 529 INJECTION, SOLUTION INTRAVENOUS at 08:45

## 2017-10-05 NOTE — PROGRESS NOTES
Subjective    Mr. Kenney is 82 y.o. male    Chief Complaint: BPH/Renal Mass    History of Present Illness     Benign Prostatic Hypertrophy  Patient complains of lower urinary tract symptoms. He reports frequency, incomplete emptying, intermittency, nocturia one time a night and weak stream. He denies straining and urgency. Patient states symptoms are of mild severity. Onset of symptoms was several years ago and was gradual in onset. His AUA Symptom Score is, 10/35.He reports a history of no complicating symptoms. He denies flank pain, gross hematuria, kidney stones and recurrent UTI.  Patient has tried Alpha blockers and 5 alpha reductase inhibitors with improvement. Last PSA was NA .     Renal Mass  Patient here for evaluation of renal mass.  The renal mass was found incidentally.  The location of the mass is the right kidney.  The mass has been present for1 week.  The mass is described as complex.  The size of the mass is 1.5cm.  It would be classifed as a Bosniak II/IIF.  The mass was found on evaluation of decreased GFR.  Associated symptoms include none.    The following portions of the patient's history were reviewed and updated as appropriate: allergies, current medications, past family history, past medical history, past social history, past surgical history and problem list.    Review of Systems   Constitutional: Negative for chills and fever.   Gastrointestinal: Negative for abdominal pain, anal bleeding and blood in stool.   Genitourinary: Negative for flank pain and hematuria.         Current Outpatient Prescriptions:   •  finasteride (PROSCAR) 5 MG tablet, Take 1 tablet by mouth Daily., Disp: 90 tablet, Rfl: 3  •  lisinopril (PRINIVIL,ZESTRIL) 10 MG tablet, TAKE 1 TABLET BY MOUTH DAILY, Disp: 90 tablet, Rfl: 3  •  tamsulosin (FLOMAX) 0.4 MG capsule 24 hr capsule, Take 1 capsule by mouth Daily., Disp: 90 capsule, Rfl: 3  No current facility-administered medications for this visit.     Past Medical  "History:   Diagnosis Date   • BPH (benign prostatic hypertrophy)    • Epididymitis    • Hypertension    • Hypertension, essential    • Mitral valve regurgitation    • Undiagnosed cardiac murmurs    • Weight loss        Past Surgical History:   Procedure Laterality Date   • COLONOSCOPY      Colon Polyp Removal    • FINGER SURGERY Right     tumor removed from a finger       Social History     Social History   • Marital status:      Spouse name: N/A   • Number of children: N/A   • Years of education: N/A     Social History Main Topics   • Smoking status: Never Smoker   • Smokeless tobacco: Never Used   • Alcohol use No   • Drug use: No   • Sexual activity: Defer     Other Topics Concern   • None     Social History Narrative       Family History   Problem Relation Age of Onset   • No Known Problems Mother    • Cancer Father    • Cancer Sister    • No Known Problems Brother    • No Known Problems Sister    • No Known Problems Sister    • No Known Problems Sister        Objective    Temp 97.4 °F (36.3 °C)  Ht 71\" (180.3 cm)  Wt 114 lb 12.8 oz (52.1 kg)  BMI 16.01 kg/m2    Physical Exam   Constitutional: He is oriented to person, place, and time. He appears well-developed and well-nourished. No distress.   Pulmonary/Chest: Effort normal.   Abdominal: Soft. He exhibits no distension and no mass. There is no tenderness. There is no rebound and no guarding. No hernia.   Neurological: He is alert and oriented to person, place, and time.   Skin: Skin is warm and dry. He is not diaphoretic.   Psychiatric: He has a normal mood and affect.   Vitals reviewed.          Results for orders placed or performed in visit on 09/28/17   POC Urinalysis Dipstick, Automated   Result Value Ref Range    Color Yellow Yellow, Straw, Dark Yellow, Florence    Clarity, UA Clear Clear    Glucose, UA Negative Negative, 1000 mg/dL (3+) mg/dL    Bilirubin Negative Negative    Ketones, UA Negative Negative    Specific Gravity  1.015 1.005 - 1.030 "    Blood, UA Small (A) Negative    pH, Urine 5.5 5.0 - 8.0    Protein, POC 30 mg/dL (A) Negative mg/dL    Urobilinogen, UA Normal Normal    Leukocytes Moderate (2+) (A) Negative    Nitrite, UA Negative Negative   Non-gynecologic Cytology - Body Fluid, Urine, Clean Catch   Result Value Ref Range    Case Report       Non-gynecologic Cytology                          Case: [id number]                                  Authorizing Provider:  Joseph Cha MD   Collected:           2017 11:27 AM          Ordering Location:     [facility]     Received:            2017 11:23 AM                                 [facility]                                                                Pathologist:           Lilo Singh MD                                                         Specimen:    Urine, Clean Catch, Voided urine                                                           Final Diagnosis       Urine, ThinPrep preparation (1):  A.  Acute inflammatory background.  B.  Negative for malignant cells.      Gross Description       Received in cytolyt in the laboratory in a container labeled with patient name and  designated as voided urine.    50 mls cloudy yellow fluid.    1 ThinPrep slide prepared.      Microscopic Description       ThinPrep preparation of voided urine reveals an acute inflammatory background with many neutrophils seen.  Squamous epithelial cells and urothelial cells are present.  Malignant cells are not identified.      Embedded Images     MRI Review  T2 weighted imaging reviewed.  Multiple bilateral simple renal cysts there is no enhancement.  There is no solid components.  These are consistent with Bosniak 1 lesions.    Assessment and Plan    Diagnoses and all orders for this visit:    Benign non-nodular prostatic hyperplasia without lower urinary tract symptoms    Renal cyst    Hematuria, microscopic    Retention, urine          MRI personally reviewed.  These  cysts.  All simple in nature.  No further follow-up necessary.  We will see him in one year for his BPH symptoms.

## 2017-10-16 ENCOUNTER — OFFICE VISIT (OUTPATIENT)
Dept: FAMILY MEDICINE CLINIC | Facility: CLINIC | Age: 82
End: 2017-10-16

## 2017-10-16 VITALS
DIASTOLIC BLOOD PRESSURE: 80 MMHG | OXYGEN SATURATION: 97 % | SYSTOLIC BLOOD PRESSURE: 120 MMHG | HEART RATE: 71 BPM | BODY MASS INDEX: 16.52 KG/M2 | RESPIRATION RATE: 16 BRPM | HEIGHT: 71 IN | WEIGHT: 118 LBS

## 2017-10-16 DIAGNOSIS — N40.0 BENIGN NON-NODULAR PROSTATIC HYPERPLASIA WITHOUT LOWER URINARY TRACT SYMPTOMS: Primary | ICD-10-CM

## 2017-10-16 DIAGNOSIS — I10 ESSENTIAL HYPERTENSION: ICD-10-CM

## 2017-10-16 PROCEDURE — 99213 OFFICE O/P EST LOW 20 MIN: CPT | Performed by: FAMILY MEDICINE

## 2017-10-16 NOTE — PROGRESS NOTES
"Subjective   Roman Kenney is a 82 y.o. male.     Chief Complaint   Patient presents with   • Follow-up     4 week     History of Present Illness     He has been the the urlogist --he feels he is urinating ok--dr quiñones did mri and was told it was ok..his bp has been stable withyout ha or cp      Current Outpatient Prescriptions:   •  finasteride (PROSCAR) 5 MG tablet, Take 1 tablet by mouth Daily., Disp: 90 tablet, Rfl: 3  •  lisinopril (PRINIVIL,ZESTRIL) 10 MG tablet, TAKE 1 TABLET BY MOUTH DAILY, Disp: 90 tablet, Rfl: 3  •  tamsulosin (FLOMAX) 0.4 MG capsule 24 hr capsule, Take 1 capsule by mouth Daily., Disp: 90 capsule, Rfl: 3  No Known Allergies    Past Medical History:   Diagnosis Date   • BPH (benign prostatic hypertrophy)    • Epididymitis    • Hypertension    • Hypertension, essential    • Mitral valve regurgitation    • Undiagnosed cardiac murmurs    • Weight loss      Past Surgical History:   Procedure Laterality Date   • COLONOSCOPY      Colon Polyp Removal    • FINGER SURGERY Right     tumor removed from a finger       Review of Systems   Constitutional: Negative.    HENT: Negative.    Eyes: Negative.    Respiratory: Negative.    Cardiovascular: Negative.    Gastrointestinal: Negative.    Endocrine: Negative.    Genitourinary: Positive for decreased urine volume and difficulty urinating.   Musculoskeletal: Negative.    Skin: Negative.    Allergic/Immunologic: Negative.    Neurological: Negative.    Hematological: Negative.    Psychiatric/Behavioral: Negative.        Objective  /80  Pulse 71  Resp 16  Ht 71\" (180.3 cm)  Wt 118 lb (53.5 kg)  SpO2 97%  BMI 16.46 kg/m2  Physical Exam   Constitutional: He is oriented to person, place, and time. He appears well-developed and well-nourished.   HENT:   Head: Normocephalic.   Eyes: Pupils are equal, round, and reactive to light.   Neck: Normal range of motion.   Cardiovascular: Normal rate, regular rhythm and intact distal pulses.    Murmur " heard.  Pulmonary/Chest: Effort normal and breath sounds normal.   Abdominal: Soft. Bowel sounds are normal.   Musculoskeletal: Normal range of motion.   Neurological: He is alert and oriented to person, place, and time. He has normal reflexes.   Skin: Skin is warm.   Psychiatric: He has a normal mood and affect. His behavior is normal. Judgment and thought content normal.   Nursing note and vitals reviewed.      Assessment/Plan   Roman was seen today for follow-up.    Diagnoses and all orders for this visit:    Benign non-nodular prostatic hyperplasia without lower urinary tract symptoms    Essential hypertension    declines flu shot             No orders of the defined types were placed in this encounter.      Follow up: 6 month(s)

## 2018-03-14 ENCOUNTER — HOSPITAL ENCOUNTER (OUTPATIENT)
Dept: CARDIOLOGY | Facility: HOSPITAL | Age: 83
Discharge: HOME | End: 2018-03-14
Admitting: NURSE PRACTITIONER

## 2018-03-14 DIAGNOSIS — R01.1 CARDIAC MURMUR: ICD-10-CM

## 2018-03-14 DIAGNOSIS — I34.1 MITRAL VALVE PROLAPSE: ICD-10-CM

## 2018-03-14 DIAGNOSIS — I34.0 NON-RHEUMATIC MITRAL REGURGITATION: ICD-10-CM

## 2018-03-14 PROCEDURE — 93306 TTE W/DOPPLER COMPLETE: CPT | Performed by: INTERNAL MEDICINE

## 2018-03-14 PROCEDURE — 93306 TTE W/DOPPLER COMPLETE: CPT

## 2018-03-16 LAB
BH CV ECHO MEAS - AO MAX PG (FULL): 8 MMHG
BH CV ECHO MEAS - AO MAX PG: 11.8 MMHG
BH CV ECHO MEAS - AO MEAN PG (FULL): 3 MMHG
BH CV ECHO MEAS - AO MEAN PG: 5 MMHG
BH CV ECHO MEAS - AO ROOT AREA (BSA CORRECTED): 1.8
BH CV ECHO MEAS - AO ROOT AREA: 7.1 CM^2
BH CV ECHO MEAS - AO ROOT DIAM: 3 CM
BH CV ECHO MEAS - AO V2 MAX: 172 CM/SEC
BH CV ECHO MEAS - AO V2 MEAN: 95 CM/SEC
BH CV ECHO MEAS - AO V2 VTI: 29.7 CM
BH CV ECHO MEAS - AVA(I,A): 2.9 CM^2
BH CV ECHO MEAS - AVA(I,D): 2.9 CM^2
BH CV ECHO MEAS - AVA(V,A): 2.4 CM^2
BH CV ECHO MEAS - AVA(V,D): 2.4 CM^2
BH CV ECHO MEAS - BSA(HAYCOCK): 1.6 M^2
BH CV ECHO MEAS - BSA: 1.7 M^2
BH CV ECHO MEAS - BZI_BMI: 16.5 KILOGRAMS/M^2
BH CV ECHO MEAS - BZI_METRIC_HEIGHT: 180.3 CM
BH CV ECHO MEAS - BZI_METRIC_WEIGHT: 53.5 KG
BH CV ECHO MEAS - CONTRAST EF 4CH: 72.6 ML/M^2
BH CV ECHO MEAS - EDV(CUBED): 214.9 ML
BH CV ECHO MEAS - EDV(MOD-SP4): 151 ML
BH CV ECHO MEAS - EDV(TEICH): 179.3 ML
BH CV ECHO MEAS - EF(CUBED): 74.1 %
BH CV ECHO MEAS - EF(MOD-SP4): 72.6 %
BH CV ECHO MEAS - EF(TEICH): 65 %
BH CV ECHO MEAS - ESV(CUBED): 55.7 ML
BH CV ECHO MEAS - ESV(MOD-SP4): 41.4 ML
BH CV ECHO MEAS - ESV(TEICH): 62.7 ML
BH CV ECHO MEAS - FS: 36.2 %
BH CV ECHO MEAS - IVS/LVPW: 1
BH CV ECHO MEAS - IVSD: 1.1 CM
BH CV ECHO MEAS - LA DIMENSION: 4.8 CM
BH CV ECHO MEAS - LA/AO: 1.6
BH CV ECHO MEAS - LAT PEAK E' VEL: 14.1 CM/SEC
BH CV ECHO MEAS - LV DIASTOLIC VOL/BSA (35-75): 89.6 ML/M^2
BH CV ECHO MEAS - LV MASS(C)D: 289 GRAMS
BH CV ECHO MEAS - LV MASS(C)DI: 171.5 GRAMS/M^2
BH CV ECHO MEAS - LV MAX PG: 3.9 MMHG
BH CV ECHO MEAS - LV MEAN PG: 2 MMHG
BH CV ECHO MEAS - LV SYSTOLIC VOL/BSA (12-30): 24.6 ML/M^2
BH CV ECHO MEAS - LV V1 MAX: 98.3 CM/SEC
BH CV ECHO MEAS - LV V1 MEAN: 59.8 CM/SEC
BH CV ECHO MEAS - LV V1 VTI: 20.6 CM
BH CV ECHO MEAS - LVIDD: 6 CM
BH CV ECHO MEAS - LVIDS: 3.8 CM
BH CV ECHO MEAS - LVLD AP4: 8.1 CM
BH CV ECHO MEAS - LVLS AP4: 5.6 CM
BH CV ECHO MEAS - LVOT AREA (M): 4.2 CM^2
BH CV ECHO MEAS - LVOT AREA: 4.2 CM^2
BH CV ECHO MEAS - LVOT DIAM: 2.3 CM
BH CV ECHO MEAS - LVPWD: 1.1 CM
BH CV ECHO MEAS - MED PEAK E' VEL: 8.81 CM/SEC
BH CV ECHO MEAS - MR ALIAS VEL: 38.5 CM/SEC
BH CV ECHO MEAS - MR ERO: 1.3 CM^2
BH CV ECHO MEAS - MR FLOW RATE: 619.3 CM^3/SEC
BH CV ECHO MEAS - MR MAX PG: 91.2 MMHG
BH CV ECHO MEAS - MR MAX VEL: 477.5 CM/SEC
BH CV ECHO MEAS - MR MEAN PG: 61.5 MMHG
BH CV ECHO MEAS - MR MEAN VEL: 354.5 CM/SEC
BH CV ECHO MEAS - MR PISA RADIUS: 1.6 CM
BH CV ECHO MEAS - MR PISA: 16.1 CM^2
BH CV ECHO MEAS - MR VOLUME: 191.3 ML
BH CV ECHO MEAS - MR VTI: 147.5 CM
BH CV ECHO MEAS - MV A MAX VEL: 57.6 CM/SEC
BH CV ECHO MEAS - MV DEC TIME: 0.14 SEC
BH CV ECHO MEAS - MV E MAX VEL: 177 CM/SEC
BH CV ECHO MEAS - MV E/A: 3.1
BH CV ECHO MEAS - RAP SYSTOLE: 10 MMHG
BH CV ECHO MEAS - RVDD: 3.6 CM
BH CV ECHO MEAS - RVSP: 78.9 MMHG
BH CV ECHO MEAS - SI(AO): 124.6 ML/M^2
BH CV ECHO MEAS - SI(CUBED): 94.5 ML/M^2
BH CV ECHO MEAS - SI(LVOT): 50.8 ML/M^2
BH CV ECHO MEAS - SI(MOD-SP4): 65 ML/M^2
BH CV ECHO MEAS - SI(TEICH): 69.2 ML/M^2
BH CV ECHO MEAS - SV(AO): 209.9 ML
BH CV ECHO MEAS - SV(CUBED): 159.2 ML
BH CV ECHO MEAS - SV(LVOT): 85.6 ML
BH CV ECHO MEAS - SV(MOD-SP4): 109.6 ML
BH CV ECHO MEAS - SV(TEICH): 116.6 ML
BH CV ECHO MEAS - TR MAX VEL: 415 CM/SEC
E/E' RATIO: 20.1
LEFT ATRIUM VOLUME INDEX: 47.9 ML/M2
LEFT ATRIUM VOLUME: 80.9 CM3
MAXIMAL PREDICTED HEART RATE: 137 BPM
STRESS TARGET HR: 116 BPM

## 2018-03-30 ENCOUNTER — OFFICE VISIT (OUTPATIENT)
Dept: CARDIOLOGY | Facility: CLINIC | Age: 83
End: 2018-03-30

## 2018-03-30 VITALS
BODY MASS INDEX: 16.1 KG/M2 | HEART RATE: 78 BPM | OXYGEN SATURATION: 98 % | SYSTOLIC BLOOD PRESSURE: 96 MMHG | WEIGHT: 115 LBS | HEIGHT: 71 IN | DIASTOLIC BLOOD PRESSURE: 62 MMHG

## 2018-03-30 DIAGNOSIS — I34.1 MITRAL VALVE PROLAPSE: ICD-10-CM

## 2018-03-30 DIAGNOSIS — I10 ESSENTIAL HYPERTENSION: Primary | ICD-10-CM

## 2018-03-30 PROBLEM — R01.1 CARDIAC MURMUR: Status: RESOLVED | Noted: 2017-03-15 | Resolved: 2018-03-30

## 2018-03-30 PROCEDURE — 99214 OFFICE O/P EST MOD 30 MIN: CPT | Performed by: INTERNAL MEDICINE

## 2018-03-30 PROCEDURE — 93000 ELECTROCARDIOGRAM COMPLETE: CPT | Performed by: INTERNAL MEDICINE

## 2018-03-30 NOTE — PROGRESS NOTES
Referring Provider: Yariel Hancock MD    Reason for Follow-up Visit: MVP    Subjective .   Chief Complaint:   Chief Complaint   Patient presents with   • Follow-up     yearly  pt states doing well.   • Hypertension     does not check at home.  he recently lost weight and BP has gone down.   • Mitral Valve Prolapse     no problems       History of present illness:  Roman Kenney is a 83 y.o. yo male with history of severe MVP. He has been suprisingly asymptomatic        History  Past Medical History:   Diagnosis Date   • BPH (benign prostatic hypertrophy)    • Epididymitis    • Hypertension    • Hypertension, essential    • Mitral valve regurgitation    • Undiagnosed cardiac murmurs    • Weight loss    ,   Past Surgical History:   Procedure Laterality Date   • COLONOSCOPY      Colon Polyp Removal    • FINGER SURGERY Right     tumor removed from a finger   ,   Family History   Problem Relation Age of Onset   • No Known Problems Mother    • Cancer Father    • Cancer Sister    • No Known Problems Brother    • No Known Problems Sister    • No Known Problems Sister    • No Known Problems Sister    ,   Social History   Substance Use Topics   • Smoking status: Never Smoker   • Smokeless tobacco: Never Used   • Alcohol use No   ,     Medications  Current Outpatient Prescriptions   Medication Sig Dispense Refill   • finasteride (PROSCAR) 5 MG tablet Take 1 tablet by mouth Daily. 90 tablet 3   • lisinopril (PRINIVIL,ZESTRIL) 10 MG tablet TAKE 1 TABLET BY MOUTH DAILY 90 tablet 3   • tamsulosin (FLOMAX) 0.4 MG capsule 24 hr capsule Take 1 capsule by mouth Daily. 90 capsule 3     No current facility-administered medications for this visit.        Allergies:  Review of patient's allergies indicates no known allergies.    Review of Systems  Review of Systems   HENT: Negative for nosebleeds.    Cardiovascular: Negative for chest pain, claudication, dyspnea on exertion, irregular heartbeat, leg swelling, near-syncope,  "orthopnea, palpitations, paroxysmal nocturnal dyspnea and syncope.   Respiratory: Negative for cough, hemoptysis and shortness of breath.    Gastrointestinal: Negative for dysphagia, hematemesis and melena.   Genitourinary: Negative for hematuria.   All other systems reviewed and are negative.      Objective     Physical Exam:  BP 96/62 (BP Location: Left arm, Patient Position: Sitting, Cuff Size: Adult)   Pulse 78   Ht 180.3 cm (71\")   Wt 52.2 kg (115 lb)   SpO2 98%   BMI 16.04 kg/m²   Physical Exam   Constitutional: He is oriented to person, place, and time. He appears well-nourished. No distress.   HENT:   Head: Normocephalic.   Eyes: No scleral icterus.   Neck: Normal range of motion. Neck supple.   Cardiovascular: Normal rate and regular rhythm.  Exam reveals no gallop and no friction rub.    Murmur heard.  High-pitched blowing holosystolic murmur is present with a grade of 4/6  at the apex  Pulmonary/Chest: Effort normal and breath sounds normal. No respiratory distress. He has no wheezes. He has no rales.   Abdominal: Soft. Bowel sounds are normal. He exhibits no distension. There is no tenderness.   Musculoskeletal: He exhibits no edema.   Neurological: He is alert and oriented to person, place, and time.   Skin: Skin is warm and dry. He is not diaphoretic. No erythema.   Psychiatric: He has a normal mood and affect. His behavior is normal.       Results Review:    ECG 12 Lead  Date/Time: 3/30/2018 9:33 AM  Performed by: NAMRATA SELBY  Authorized by: NAMRATA SELBY   Comparison: compared with previous ECG   Rhythm: sinus rhythm  Rate: normal  Conduction: conduction normal  ST Segments: ST segments normal  T Waves: T waves normal  QRS axis: normal  Other findings: LVH with strain  Clinical impression: non-specific ECG            Hospital Outpatient Visit on 03/14/2018   Component Date Value Ref Range Status   • BSA 03/16/2018 1.7  m^2 Final   • BH CV ECHO JEANETTE - RVDD 03/16/2018 3.6  cm Final   • IVSd " 03/16/2018 1.1  cm Final   • LVIDd 03/16/2018 6.0  cm Final   • LVIDs 03/16/2018 3.8  cm Final   • LVPWd 03/16/2018 1.1  cm Final   • IVS/LVPW 03/16/2018 1.0   Final   • FS 03/16/2018 36.2  % Final   • EDV(Teich) 03/16/2018 179.3  ml Final   • ESV(Teich) 03/16/2018 62.7  ml Final   • EF(Teich) 03/16/2018 65.0  % Final   • EDV(cubed) 03/16/2018 214.9  ml Final   • ESV(cubed) 03/16/2018 55.7  ml Final   • EF(cubed) 03/16/2018 74.1  % Final   • LV mass(C)d 03/16/2018 289.0  grams Final   • LV mass(C)dI 03/16/2018 171.5  grams/m^2 Final   • SV(Teich) 03/16/2018 116.6  ml Final   • SI(Teich) 03/16/2018 69.2  ml/m^2 Final   • SV(cubed) 03/16/2018 159.2  ml Final   • SI(cubed) 03/16/2018 94.5  ml/m^2 Final   • Ao root diam 03/16/2018 3.0  cm Final   • Ao root area 03/16/2018 7.1  cm^2 Final   • LA dimension 03/16/2018 4.8  cm Final   • LA/Ao 03/16/2018 1.6   Final   • LVOT diam 03/16/2018 2.3  cm Final   • LVOT area 03/16/2018 4.2  cm^2 Final   • LVOT area(traced) 03/16/2018 4.2  cm^2 Final   • LVLd ap4 03/16/2018 8.1  cm Final   • EDV(MOD-sp4) 03/16/2018 151.0  ml Final   • LVLs ap4 03/16/2018 5.6  cm Final   • ESV(MOD-sp4) 03/16/2018 41.4  ml Final   • EF(MOD-sp4) 03/16/2018 72.6  % Final   • SV(MOD-sp4) 03/16/2018 109.6  ml Final   • SI(MOD-sp4) 03/16/2018 65.0  ml/m^2 Final   • Ao root area (BSA corrected) 03/16/2018 1.8   Final   • CONTRAST EF 4CH 03/16/2018 72.6  ml/m^2 Final   • LV Diastolic corrected for BSA 03/16/2018 89.6  ml/m^2 Final   • LV Systolic corrected for BSA 03/16/2018 24.6  ml/m^2 Final   • MV E max eri 03/16/2018 177.0  cm/sec Final   • MV A max eri 03/16/2018 57.6  cm/sec Final   • MV E/A 03/16/2018 3.1   Final   • MV dec time 03/16/2018 0.14  sec Final   • Ao pk eri 03/16/2018 172.0  cm/sec Final   • Ao max PG 03/16/2018 11.8  mmHg Final   • Ao max PG (full) 03/16/2018 8.0  mmHg Final   • Ao V2 mean 03/16/2018 95.0  cm/sec Final   • Ao mean PG 03/16/2018 5.0  mmHg Final   • Ao mean PG (full)  03/16/2018 3.0  mmHg Final   • Ao V2 VTI 03/16/2018 29.7  cm Final   • ESTELA(I,A) 03/16/2018 2.9  cm^2 Final   • ESTELA(I,D) 03/16/2018 2.9  cm^2 Final   • ESTELA(V,A) 03/16/2018 2.4  cm^2 Final   • ESTELA(V,D) 03/16/2018 2.4  cm^2 Final   • LV V1 max PG 03/16/2018 3.9  mmHg Final   • LV V1 mean PG 03/16/2018 2.0  mmHg Final   • LV V1 max 03/16/2018 98.3  cm/sec Final   • LV V1 mean 03/16/2018 59.8  cm/sec Final   • LV V1 VTI 03/16/2018 20.6  cm Final   • MR max armando 03/16/2018 477.5  cm/sec Final   • MR max PG 03/16/2018 91.2  mmHg Final   • MR mean armando 03/16/2018 354.5  cm/sec Final   • MR mean PG 03/16/2018 61.5  mmHg Final   • MR VTI 03/16/2018 147.5  cm Final   • MR PISA 03/16/2018 16.1  cm^2 Final   • MR flow rate 03/16/2018 619.3  cm^3/sec Final   • MR ERO 03/16/2018 1.3  cm^2 Final   • MR volume 03/16/2018 191.3  ml Final   • MR PISA radius 03/16/2018 1.6  cm Final   • MR alias armando 03/16/2018 38.5  cm/sec Final   • SV(Ao) 03/16/2018 209.9  ml Final   • SI(Ao) 03/16/2018 124.6  ml/m^2 Final   • SV(LVOT) 03/16/2018 85.6  ml Final   • SI(LVOT) 03/16/2018 50.8  ml/m^2 Final   • TR max armando 03/16/2018 415.0  cm/sec Final   • RVSP(TR) 03/16/2018 78.9  mmHg Final   • RAP systole 03/16/2018 10.0  mmHg Final   • BH CV ECHO JEANETTE - BZI_BMI 03/16/2018 16.5  kilograms/m^2 Final   • BH CV ECHO JEANETTE - BSA([place]) 03/16/2018 1.6  m^2 Final   •  CV ECHO JEANETTE - BZI_METRIC_WEIGHT 03/16/2018 53.5  kg Final   •  CV ECHO JEANETTE - BZI_METRIC_HEIGHT 03/16/2018 180.3  cm Final   • Target HR (85%) 03/16/2018 116  bpm Final   • Max. Pred. HR (100%) 03/16/2018 137  bpm Final   • LA volume 03/16/2018 80.9  cm3 Final   • E/E' ratio 03/16/2018 20.1   Final   • LA Volume Index 03/16/2018 47.9  mL/m2 Final   • Lat Peak E' Armando 03/16/2018 14.1  cm/sec Final   • Med Peak E' Armando 03/16/2018 8.81  cm/sec Final       Assessment/Plan   Roman was seen today for follow-up, hypertension and mitral valve prolapse.    Diagnoses and all orders for this  visit:    Essential hypertension, actually running low. May need to reduce some of his meds if this persists or gets worse    Mitral valve prolapse, severe MR but suprisingly asymptomatic. Will continue with yearly echo    Other orders  -     ECG 12 Lead

## 2018-04-12 ENCOUNTER — HOSPITAL ENCOUNTER (OUTPATIENT)
Dept: CARDIOLOGY | Facility: HOSPITAL | Age: 83
Discharge: HOME | End: 2018-04-12
Attending: INTERNAL MEDICINE | Admitting: INTERNAL MEDICINE

## 2018-04-12 DIAGNOSIS — I34.1 MITRAL VALVE PROLAPSE: ICD-10-CM

## 2018-04-12 LAB
BH CV ECHO MEAS - AO MAX PG (FULL): 11.5 MMHG
BH CV ECHO MEAS - AO MAX PG: 14.7 MMHG
BH CV ECHO MEAS - AO MEAN PG (FULL): 5 MMHG
BH CV ECHO MEAS - AO MEAN PG: 6 MMHG
BH CV ECHO MEAS - AO ROOT AREA (BSA CORRECTED): 2.6
BH CV ECHO MEAS - AO ROOT AREA: 15.2 CM^2
BH CV ECHO MEAS - AO ROOT DIAM: 4.4 CM
BH CV ECHO MEAS - AO V2 MAX: 192 CM/SEC
BH CV ECHO MEAS - AO V2 MEAN: 111 CM/SEC
BH CV ECHO MEAS - AO V2 VTI: 29.7 CM
BH CV ECHO MEAS - AVA(I,A): 1.8 CM^2
BH CV ECHO MEAS - AVA(I,D): 1.8 CM^2
BH CV ECHO MEAS - AVA(V,A): 1.6 CM^2
BH CV ECHO MEAS - AVA(V,D): 1.6 CM^2
BH CV ECHO MEAS - BSA(HAYCOCK): 1.6 M^2
BH CV ECHO MEAS - BSA: 1.7 M^2
BH CV ECHO MEAS - BZI_BMI: 16.7 KILOGRAMS/M^2
BH CV ECHO MEAS - BZI_METRIC_HEIGHT: 180.3 CM
BH CV ECHO MEAS - BZI_METRIC_WEIGHT: 54.4 KG
BH CV ECHO MEAS - EDV(CUBED): 142.2 ML
BH CV ECHO MEAS - EDV(TEICH): 130.7 ML
BH CV ECHO MEAS - EF(CUBED): 79.9 %
BH CV ECHO MEAS - EF(TEICH): 71.9 %
BH CV ECHO MEAS - ESV(CUBED): 28.7 ML
BH CV ECHO MEAS - ESV(TEICH): 36.7 ML
BH CV ECHO MEAS - FS: 41.4 %
BH CV ECHO MEAS - IVS/LVPW: 0.88
BH CV ECHO MEAS - IVSD: 1.1 CM
BH CV ECHO MEAS - LA DIMENSION: 5 CM
BH CV ECHO MEAS - LA/AO: 1.1
BH CV ECHO MEAS - LV MASS(C)D: 233.3 GRAMS
BH CV ECHO MEAS - LV MASS(C)DI: 137.4 GRAMS/M^2
BH CV ECHO MEAS - LV MAX PG: 3.3 MMHG
BH CV ECHO MEAS - LV MEAN PG: 1 MMHG
BH CV ECHO MEAS - LV V1 MAX: 90.5 CM/SEC
BH CV ECHO MEAS - LV V1 MEAN: 48.6 CM/SEC
BH CV ECHO MEAS - LV V1 VTI: 15.4 CM
BH CV ECHO MEAS - LVIDD: 5.2 CM
BH CV ECHO MEAS - LVIDS: 3.1 CM
BH CV ECHO MEAS - LVOT AREA (M): 3.5 CM^2
BH CV ECHO MEAS - LVOT AREA: 3.5 CM^2
BH CV ECHO MEAS - LVOT DIAM: 2.1 CM
BH CV ECHO MEAS - LVPWD: 1.2 CM
BH CV ECHO MEAS - MR ALIAS VEL: 38.5 CM/SEC
BH CV ECHO MEAS - MR ERO: 0.07 CM^2
BH CV ECHO MEAS - MR FLOW RATE: 38.7 CM^3/SEC
BH CV ECHO MEAS - MR MAX PG: 108.4 MMHG
BH CV ECHO MEAS - MR MAX VEL: 520 CM/SEC
BH CV ECHO MEAS - MR MEAN PG: 59 MMHG
BH CV ECHO MEAS - MR MEAN VEL: 358 CM/SEC
BH CV ECHO MEAS - MR PISA RADIUS: 0.4 CM
BH CV ECHO MEAS - MR PISA: 1 CM^2
BH CV ECHO MEAS - MR VOLUME: 10.3 ML
BH CV ECHO MEAS - MR VTI: 138 CM
BH CV ECHO MEAS - MV A MAX VEL: 53.3 CM/SEC
BH CV ECHO MEAS - MV DEC SLOPE: 565 CM/SEC^2
BH CV ECHO MEAS - MV DEC TIME: 0.17 SEC
BH CV ECHO MEAS - MV E MAX VEL: 187 CM/SEC
BH CV ECHO MEAS - MV E/A: 3.5
BH CV ECHO MEAS - MV P1/2T MAX VEL: 185 CM/SEC
BH CV ECHO MEAS - MV P1/2T: 95.9 MSEC
BH CV ECHO MEAS - MVA P1/2T LCG: 1.2 CM^2
BH CV ECHO MEAS - MVA(P1/2T): 2.3 CM^2
BH CV ECHO MEAS - RAP SYSTOLE: 5 MMHG
BH CV ECHO MEAS - RVSP: 54.6 MMHG
BH CV ECHO MEAS - SI(AO): 266.1 ML/M^2
BH CV ECHO MEAS - SI(CUBED): 66.9 ML/M^2
BH CV ECHO MEAS - SI(LVOT): 31.4 ML/M^2
BH CV ECHO MEAS - SI(TEICH): 55.3 ML/M^2
BH CV ECHO MEAS - SV(AO): 451.6 ML
BH CV ECHO MEAS - SV(CUBED): 113.6 ML
BH CV ECHO MEAS - SV(LVOT): 53.3 ML
BH CV ECHO MEAS - SV(TEICH): 93.9 ML
BH CV ECHO MEAS - TR MAX VEL: 352 CM/SEC
E/E' RATIO: 44
MAXIMAL PREDICTED HEART RATE: 137 BPM
STRESS TARGET HR: 116 BPM

## 2018-04-12 PROCEDURE — 93306 TTE W/DOPPLER COMPLETE: CPT | Performed by: INTERNAL MEDICINE

## 2018-04-12 PROCEDURE — 93306 TTE W/DOPPLER COMPLETE: CPT

## 2018-04-16 ENCOUNTER — OFFICE VISIT (OUTPATIENT)
Dept: FAMILY MEDICINE CLINIC | Facility: CLINIC | Age: 83
End: 2018-04-16

## 2018-04-16 VITALS
RESPIRATION RATE: 16 BRPM | BODY MASS INDEX: 15.96 KG/M2 | DIASTOLIC BLOOD PRESSURE: 64 MMHG | OXYGEN SATURATION: 99 % | WEIGHT: 114 LBS | HEART RATE: 82 BPM | SYSTOLIC BLOOD PRESSURE: 92 MMHG | HEIGHT: 71 IN

## 2018-04-16 DIAGNOSIS — N40.0 BENIGN NON-NODULAR PROSTATIC HYPERPLASIA WITHOUT LOWER URINARY TRACT SYMPTOMS: ICD-10-CM

## 2018-04-16 DIAGNOSIS — I10 ESSENTIAL HYPERTENSION: Primary | ICD-10-CM

## 2018-04-16 DIAGNOSIS — I34.0 NON-RHEUMATIC MITRAL REGURGITATION: ICD-10-CM

## 2018-04-16 PROCEDURE — 99213 OFFICE O/P EST LOW 20 MIN: CPT | Performed by: FAMILY MEDICINE

## 2018-04-16 NOTE — PROGRESS NOTES
"Subjective   Roman Kenney is a 83 y.o. male.     Chief Complaint   Patient presents with   • Follow-up     6 mo  BPH       History of Present Illness     Mr Yuen is followed byh dr ford for valvular heart dz--he says his bp is doing well without cp or ha--his bph syjmtoms are stable      Current Outpatient Prescriptions:   •  finasteride (PROSCAR) 5 MG tablet, Take 1 tablet by mouth Daily., Disp: 90 tablet, Rfl: 3  •  lisinopril (PRINIVIL,ZESTRIL) 10 MG tablet, TAKE 1 TABLET BY MOUTH DAILY, Disp: 90 tablet, Rfl: 3  •  tamsulosin (FLOMAX) 0.4 MG capsule 24 hr capsule, Take 1 capsule by mouth Daily., Disp: 90 capsule, Rfl: 3  No Known Allergies    Past Medical History:   Diagnosis Date   • BPH (benign prostatic hypertrophy)    • Epididymitis    • Hypertension    • Hypertension, essential    • Mitral valve regurgitation    • Undiagnosed cardiac murmurs    • Weight loss      Past Surgical History:   Procedure Laterality Date   • COLONOSCOPY      Colon Polyp Removal    • FINGER SURGERY Right     tumor removed from a finger       Review of Systems   Constitutional: Negative.    HENT: Negative.    Eyes: Negative.    Respiratory: Negative.    Cardiovascular: Negative.    Gastrointestinal: Negative.    Endocrine: Negative.    Genitourinary: Negative.    Musculoskeletal: Negative.    Skin: Negative.    Allergic/Immunologic: Negative.    Neurological: Negative.    Hematological: Negative.    Psychiatric/Behavioral: Negative.        Objective  BP 92/64   Pulse 82   Resp 16   Ht 180.3 cm (71\")   Wt 51.7 kg (114 lb)   SpO2 99%   BMI 15.90 kg/m²   Physical Exam   Constitutional: He is oriented to person, place, and time. He appears well-developed and well-nourished.   HENT:   Head: Normocephalic.   Eyes: EOM are normal. Pupils are equal, round, and reactive to light.   Neck: Normal range of motion. Neck supple.   Cardiovascular: Normal rate, regular rhythm and intact distal pulses.    Pulmonary/Chest: Effort normal " and breath sounds normal.   Abdominal: Soft. Bowel sounds are normal.   Musculoskeletal: Normal range of motion.   Neurological: He is alert and oriented to person, place, and time. He has normal reflexes.   Skin: Skin is warm. Capillary refill takes less than 2 seconds.   Psychiatric: He has a normal mood and affect. His behavior is normal. Judgment and thought content normal.   Nursing note and vitals reviewed.      Assessment/Plan   Roman was seen today for follow-up.    Diagnoses and all orders for this visit:    Essential hypertension  -     CBC w AUTO Differential  -     Comprehensive metabolic panel    Non-rheumatic mitral regurgitation    Benign non-nodular prostatic hyperplasia without lower urinary tract symptoms      Patient's Body mass index is 15.9 kg/m². BMI is below normal parameters. Follow-up plan includes:  treating the underlying disease process.           Orders Placed This Encounter   Procedures   • Comprehensive metabolic panel   • CBC w AUTO Differential     Order Specific Question:   Manual Differential     Answer:   No     Current outpatient and discharge medications have been reconciled for the patient.  Yariel Hancock MD  Follow up: 6 month(s)

## 2018-04-17 LAB
ALBUMIN SERPL-MCNC: 4.1 G/DL (ref 3.5–5)
ALBUMIN/GLOB SERPL: 1.2 G/DL (ref 1.1–2.5)
ALP SERPL-CCNC: 105 U/L (ref 24–120)
ALT SERPL-CCNC: 27 U/L (ref 0–54)
AST SERPL-CCNC: 26 U/L (ref 7–45)
BASOPHILS # BLD AUTO: 0.05 10*3/MM3 (ref 0–0.2)
BASOPHILS NFR BLD AUTO: 0.8 % (ref 0–2)
BILIRUB SERPL-MCNC: 0.8 MG/DL (ref 0.1–1)
BUN SERPL-MCNC: 25 MG/DL (ref 5–21)
BUN/CREAT SERPL: 19.7 (ref 7–25)
CALCIUM SERPL-MCNC: 10.4 MG/DL (ref 8.4–10.4)
CHLORIDE SERPL-SCNC: 104 MMOL/L (ref 98–110)
CO2 SERPL-SCNC: 26 MMOL/L (ref 24–31)
CREAT SERPL-MCNC: 1.27 MG/DL (ref 0.5–1.4)
EOSINOPHIL # BLD AUTO: 0.09 10*3/MM3 (ref 0–0.7)
EOSINOPHIL NFR BLD AUTO: 1.5 % (ref 0–4)
ERYTHROCYTE [DISTWIDTH] IN BLOOD BY AUTOMATED COUNT: 13.6 % (ref 12–15)
GFR SERPLBLD CREATININE-BSD FMLA CKD-EPI: 54 ML/MIN/1.73
GFR SERPLBLD CREATININE-BSD FMLA CKD-EPI: 66 ML/MIN/1.73
GLOBULIN SER CALC-MCNC: 3.5 GM/DL
GLUCOSE SERPL-MCNC: 105 MG/DL (ref 70–100)
HCT VFR BLD AUTO: 39.1 % (ref 40–52)
HGB BLD-MCNC: 12.1 G/DL (ref 14–18)
IMM GRANULOCYTES # BLD: 0.02 10*3/MM3 (ref 0–0.03)
IMM GRANULOCYTES NFR BLD: 0.3 % (ref 0–5)
LYMPHOCYTES # BLD AUTO: 1.97 10*3/MM3 (ref 0.72–4.86)
LYMPHOCYTES NFR BLD AUTO: 32 % (ref 15–45)
MCH RBC QN AUTO: 28.4 PG (ref 28–32)
MCHC RBC AUTO-ENTMCNC: 30.9 G/DL (ref 33–36)
MCV RBC AUTO: 91.8 FL (ref 82–95)
MONOCYTES # BLD AUTO: 0.5 10*3/MM3 (ref 0.19–1.3)
MONOCYTES NFR BLD AUTO: 8.1 % (ref 4–12)
NEUTROPHILS # BLD AUTO: 3.52 10*3/MM3 (ref 1.87–8.4)
NEUTROPHILS NFR BLD AUTO: 57.3 % (ref 39–78)
NRBC BLD AUTO-RTO: 0 /100 WBC (ref 0–0)
PLATELET # BLD AUTO: 248 10*3/MM3 (ref 130–400)
POTASSIUM SERPL-SCNC: 4.5 MMOL/L (ref 3.5–5.3)
PROT SERPL-MCNC: 7.6 G/DL (ref 6.3–8.7)
RBC # BLD AUTO: 4.26 10*6/MM3 (ref 4.8–5.9)
SODIUM SERPL-SCNC: 144 MMOL/L (ref 135–145)
WBC # BLD AUTO: 6.15 10*3/MM3 (ref 4.8–10.8)

## 2018-04-19 ENCOUNTER — OFFICE VISIT (OUTPATIENT)
Dept: FAMILY MEDICINE CLINIC | Facility: CLINIC | Age: 83
End: 2018-04-19

## 2018-04-19 ENCOUNTER — TELEPHONE (OUTPATIENT)
Dept: CARDIOLOGY | Facility: CLINIC | Age: 83
End: 2018-04-19

## 2018-04-19 VITALS
BODY MASS INDEX: 15.96 KG/M2 | OXYGEN SATURATION: 99 % | SYSTOLIC BLOOD PRESSURE: 92 MMHG | DIASTOLIC BLOOD PRESSURE: 64 MMHG | HEART RATE: 82 BPM | WEIGHT: 114 LBS | RESPIRATION RATE: 16 BRPM | HEIGHT: 71 IN

## 2018-04-19 DIAGNOSIS — R63.4 WEIGHT LOSS: Primary | ICD-10-CM

## 2018-04-19 PROCEDURE — 99213 OFFICE O/P EST LOW 20 MIN: CPT | Performed by: FAMILY MEDICINE

## 2018-04-19 NOTE — TELEPHONE ENCOUNTER
----- Message from Scott Burleson MD sent at 4/13/2018 10:28 AM CDT -----  No change  Pt informed.  Mailed result letter.  Dixon Aguiar, CMA

## 2018-04-19 NOTE — PROGRESS NOTES
"Subjective   Roman Kenney is a 83 y.o. male.     No chief complaint on file.       History of Present Illness     i brought mr and mrs kenney back to the office to discss his weight loss--loooking at his record--he has lost only 4 lbs since last october--he says his appetite is good and denies any fevr, chills or pain anywhere      Current Outpatient Prescriptions:   •  finasteride (PROSCAR) 5 MG tablet, Take 1 tablet by mouth Daily., Disp: 90 tablet, Rfl: 3  •  lisinopril (PRINIVIL,ZESTRIL) 10 MG tablet, TAKE 1 TABLET BY MOUTH DAILY, Disp: 90 tablet, Rfl: 3  •  tamsulosin (FLOMAX) 0.4 MG capsule 24 hr capsule, Take 1 capsule by mouth Daily., Disp: 90 capsule, Rfl: 3  No Known Allergies    Past Medical History:   Diagnosis Date   • BPH (benign prostatic hypertrophy)    • Epididymitis    • Hypertension    • Hypertension, essential    • Mitral valve regurgitation    • Undiagnosed cardiac murmurs    • Weight loss      Past Surgical History:   Procedure Laterality Date   • COLONOSCOPY      Colon Polyp Removal    • FINGER SURGERY Right     tumor removed from a finger       Review of Systems   Constitutional: Positive for unexpected weight change.   HENT: Negative.    Eyes: Negative.    Respiratory: Negative.    Cardiovascular: Negative.    Gastrointestinal: Negative.    Endocrine: Negative.    Genitourinary: Negative.    Musculoskeletal: Negative.    Skin: Negative.    Allergic/Immunologic: Negative.    Neurological: Negative.    Hematological: Negative.    Psychiatric/Behavioral: Negative.        Objective  BP 92/64   Pulse 82   Resp 16   Ht 180.3 cm (71\")   Wt 51.7 kg (114 lb)   SpO2 99%   BMI 15.90 kg/m²   Physical Exam   Constitutional: He is oriented to person, place, and time. He appears well-developed and well-nourished.   HENT:   Head: Normocephalic and atraumatic.   Right Ear: External ear normal.   Left Ear: External ear normal.   Nose: Nose normal.   Mouth/Throat: Oropharynx is clear and " moist.   Eyes: Conjunctivae are normal. Pupils are equal, round, and reactive to light.   Neck: Normal range of motion. Neck supple.   Cardiovascular: Normal rate, regular rhythm, normal heart sounds and intact distal pulses.    Pulmonary/Chest: Effort normal and breath sounds normal.   Abdominal: Soft. Bowel sounds are normal.   Musculoskeletal: Normal range of motion.   Neurological: He is alert and oriented to person, place, and time. He has normal reflexes.   Skin: Skin is warm. Capillary refill takes less than 2 seconds.   Psychiatric: He has a normal mood and affect. His behavior is normal. Judgment and thought content normal.   Nursing note and vitals reviewed.      Assessment/Plan   Diagnoses and all orders for this visit:    Weight loss      Mr hester insists he feels good and has good appetitie and recent labs were unrevealing--luis monitor for now --he and his wife agreeable         No orders of the defined types were placed in this encounter.      Follow up: 4 week(s)

## 2018-04-23 RX ORDER — LISINOPRIL 10 MG/1
TABLET ORAL
Qty: 90 TABLET | Refills: 0 | Status: SHIPPED | OUTPATIENT
Start: 2018-04-23 | End: 2018-07-13 | Stop reason: SDUPTHER

## 2018-05-17 ENCOUNTER — OFFICE VISIT (OUTPATIENT)
Dept: FAMILY MEDICINE CLINIC | Facility: CLINIC | Age: 83
End: 2018-05-17

## 2018-05-17 VITALS
OXYGEN SATURATION: 98 % | RESPIRATION RATE: 16 BRPM | HEIGHT: 71 IN | DIASTOLIC BLOOD PRESSURE: 68 MMHG | BODY MASS INDEX: 15.96 KG/M2 | WEIGHT: 114 LBS | HEART RATE: 68 BPM | SYSTOLIC BLOOD PRESSURE: 102 MMHG

## 2018-05-17 DIAGNOSIS — R63.4 WEIGHT LOSS: Primary | ICD-10-CM

## 2018-05-17 PROCEDURE — G0439 PPPS, SUBSEQ VISIT: HCPCS | Performed by: FAMILY MEDICINE

## 2018-05-17 NOTE — PATIENT INSTRUCTIONS
Advance Directive  Advance directives are legal documents that let you make choices ahead of time about your health care and medical treatment in case you become unable to communicate for yourself. Advance directives are a way for you to communicate your wishes to family, friends, and health care providers. This can help convey your decisions about end-of-life care if you become unable to communicate.  Discussing and writing advance directives should happen over time rather than all at once. Advance directives can be changed depending on your situation and what you want, even after you have signed the advance directives.  If you do not have an advance directive, some states assign family decision makers to act on your behalf based on how closely you are related to them. Each state has its own laws regarding advance directives. You may want to check with your health care provider, , or state representative about the laws in your state. There are different types of advance directives, such as:  · Medical power of .  · Living will.  · Do not resuscitate (DNR) or do not attempt resuscitation (DNAR) order.  Health care proxy and medical power of   A health care proxy, also called a health care agent, is a person who is appointed to make medical decisions for you in cases in which you are unable to make the decisions yourself. Generally, people choose someone they know well and trust to represent their preferences. Make sure to ask this person for an agreement to act as your proxy. A proxy may have to exercise judgment in the event of a medical decision for which your wishes are not known.  A medical power of  is a legal document that names your health care proxy. Depending on the laws in your state, after the document is written, it may also need to be:  · Signed.  · Notarized.  · Dated.  · Copied.  · Witnessed.  · Incorporated into your medical record.  You may also want to appoint  someone to manage your financial affairs in a situation in which you are unable to do so. This is called a durable power of  for finances. It is a separate legal document from the durable power of  for health care. You may choose the same person or someone different from your health care proxy to act as your agent in financial matters.  If you do not appoint a proxy, or if there is a concern that the proxy is not acting in your best interests, a court-appointed guardian may be designated to act on your behalf.  Living will  A living will is a set of instructions documenting your wishes about medical care when you cannot express them yourself. Health care providers should keep a copy of your living will in your medical record. You may want to give a copy to family members or friends. To alert caregivers in case of an emergency, you can place a card in your wallet to let them know that you have a living will and where they can find it. A living will is used if you become:  · Terminally ill.  · Incapacitated.  · Unable to communicate or make decisions.  Items to consider in your living will include:  · The use or non-use of life-sustaining equipment, such as dialysis machines and breathing machines (ventilators).  · A DNR or DNAR order, which is the instruction not to use cardiopulmonary resuscitation (CPR) if breathing or heartbeat stops.  · The use or non-use of tube feeding.  · Withholding of food and fluids.  · Comfort (palliative) care when the goal becomes comfort rather than a cure.  · Organ and tissue donation.  A living will does not give instructions for distributing your money and property if you should pass away. It is recommended that you seek the advice of a  when writing a will. Decisions about taxes, beneficiaries, and asset distribution will be legally binding. This process can relieve your family and friends of any concerns surrounding disputes or questions that may come up about  the distribution of your assets.  DNR or DNAR  A DNR or DNAR order is a request not to have CPR in the event that your heart stops beating or you stop breathing. If a DNR or DNAR order has not been made and shared, a health care provider will try to help any patient whose heart has stopped or who has stopped breathing. If you plan to have surgery, talk with your health care provider about how your DNR or DNAR order will be followed if problems occur.  Summary  · Advance directives are the legal documents that allow you to make choices ahead of time about your health care and medical treatment in case you become unable to communicate for yourself.  · The process of discussing and writing advance directives should happen over time. You can change the advance directives, even after you have signed them.  · Advance directives include DNR or DNAR orders, living simon, and designating an agent as your medical power of .  This information is not intended to replace advice given to you by your health care provider. Make sure you discuss any questions you have with your health care provider.  Document Released: 03/26/2009 Document Revised: 11/06/2017 Document Reviewed: 11/06/2017  [employer] Interactive Patient Education © 2017 [employer]

## 2018-05-17 NOTE — PROGRESS NOTES
"Subjective   Roman Kenney is a 83 y.o. male.     Chief Complaint   Patient presents with   • Follow-up     4 week    wt loss        History of Present Illness     he is drinking ensure and keep his weight stable--he feels good      Current Outpatient Prescriptions:   •  finasteride (PROSCAR) 5 MG tablet, Take 1 tablet by mouth Daily., Disp: 90 tablet, Rfl: 3  •  lisinopril (PRINIVIL,ZESTRIL) 10 MG tablet, TAKE 1 TABLET BY MOUTH EVERY DAY, Disp: 90 tablet, Rfl: 0  •  tamsulosin (FLOMAX) 0.4 MG capsule 24 hr capsule, Take 1 capsule by mouth Daily., Disp: 90 capsule, Rfl: 3  No Known Allergies    Past Medical History:   Diagnosis Date   • BPH (benign prostatic hypertrophy)    • Epididymitis    • Hypertension    • Hypertension, essential    • Mitral valve regurgitation    • Undiagnosed cardiac murmurs    • Weight loss      Past Surgical History:   Procedure Laterality Date   • COLONOSCOPY      Colon Polyp Removal    • FINGER SURGERY Right     tumor removed from a finger       Review of Systems   Constitutional: Negative.    HENT: Negative.    Eyes: Negative.    Respiratory: Negative.    Cardiovascular: Negative.    Gastrointestinal: Negative.    Endocrine: Negative.    Genitourinary: Negative.    Musculoskeletal: Negative.    Skin: Negative.    Allergic/Immunologic: Negative.    Neurological: Negative.    Hematological: Negative.    Psychiatric/Behavioral: Negative.        Objective  /68   Pulse 68   Resp 16   Ht 180.3 cm (71\")   Wt 51.7 kg (114 lb)   SpO2 98%   BMI 15.90 kg/m²   Physical Exam   Constitutional: He is oriented to person, place, and time. He appears well-developed and well-nourished.   HENT:   Head: Normocephalic and atraumatic.   Eyes: EOM are normal. Pupils are equal, round, and reactive to light.   Neck: Normal range of motion. Neck supple.   Cardiovascular: Normal rate.    Pulmonary/Chest: Effort normal and breath sounds normal.   Abdominal: Soft. Bowel sounds are normal. "   Musculoskeletal: Normal range of motion.   Neurological: He is alert and oriented to person, place, and time.   Skin: Skin is warm. Capillary refill takes less than 2 seconds.   Psychiatric: He has a normal mood and affect. His behavior is normal. Judgment and thought content normal.   Nursing note and vitals reviewed.      Assessment/Plan   Roman was seen today for follow-up.    Diagnoses and all orders for this visit:    Weight loss                 No orders of the defined types were placed in this encounter.      Follow up: 2 month(s)

## 2018-05-17 NOTE — PROGRESS NOTES
QUICK REFERENCE INFORMATION:  The ABCs of the Annual Wellness Visit    Subsequent Medicare Wellness Visit    HEALTH RISK ASSESSMENT    1935    Recent Hospitalizations:  No hospitalization(s) within the last year..        Current Medical Providers:  Patient Care Team:  Yariel Hancock MD as PCP - General (Family Medicine)  Yariel Hancock MD as PCP - Family Medicine  Yariel Hancock MD as PCP - Claims Attributed  Joseph Cha MD as Consulting Physician (Urology)        Smoking Status:  History   Smoking Status   • Never Smoker   Smokeless Tobacco   • Never Used       Alcohol Consumption:  History   Alcohol Use No       Depression Screen:   PHQ-2/PHQ-9 Depression Screening 3/22/2017   Little interest or pleasure in doing things 0   Feeling down, depressed, or hopeless 0   Trouble falling or staying asleep, or sleeping too much 1   Feeling tired or having little energy 1   Poor appetite or overeating 0   Feeling bad about yourself - or that you are a failure or have let yourself or your family down 0   Trouble concentrating on things, such as reading the newspaper or watching television 0   Moving or speaking so slowly that other people could have noticed. Or the opposite - being so fidgety or restless that you have been moving around a lot more than usual 0   Thoughts that you would be better off dead, or of hurting yourself in some way 0   Total Score 2   If you checked off any problems, how difficult have these problems made it for you to do your work, take care of things at home, or get along with other people? Not difficult at all       Health Habits and Functional and Cognitive Screening:  No flowsheet data found.        Does the patient have evidence of cognitive impairment? No    Aspirin use counseling: Does not need ASA (and currently is not on it)      Recent Lab Results:  CMP:  Lab Results   Component Value Date     (H) 04/16/2018    BUN 25 (H) 04/16/2018    CREATININE  1.27 04/16/2018    EGFRIFNONA 54 (L) 04/16/2018    EGFRIFAFRI 66 04/16/2018    BCR 19.7 04/16/2018     04/16/2018    K 4.5 04/16/2018    CO2 26.0 04/16/2018    CALCIUM 10.4 04/16/2018    PROTENTOTREF 7.6 04/16/2018    ALBUMIN 4.10 04/16/2018    LABGLOBREF 3.5 04/16/2018    LABIL2 1.2 04/16/2018    BILITOT 0.8 04/16/2018    ALKPHOS 105 04/16/2018    AST 26 04/16/2018    ALT 27 04/16/2018     Lipid Panel:  Lab Results   Component Value Date    TRIG 74 09/22/2016    HDL 69 09/22/2016    VLDL 14.8 09/22/2016     HbA1c:       Visual Acuity:  No exam data present    Age-appropriate Screening Schedule:  Refer to the list below for future screening recommendations based on patient's age, sex and/or medical conditions. Orders for these recommended tests are listed in the plan section. The patient has been provided with a written plan.    Health Maintenance   Topic Date Due   • TDAP/TD VACCINES (1 - Tdap) 03/05/1954   • PNEUMOCOCCAL VACCINES (65+ LOW/MEDIUM RISK) (1 of 2 - PCV13) 03/05/2000   • ZOSTER VACCINE  09/22/2016   • INFLUENZA VACCINE  08/01/2018        Subjective   History of Present Illness    Roman Kenney is a 83 y.o. male who presents for an Subsequent Wellness Visit.    The following portions of the patient's history were reviewed and updated as appropriate: allergies, current medications, past family history, past medical history, past social history, past surgical history and problem list.    Outpatient Medications Prior to Visit   Medication Sig Dispense Refill   • finasteride (PROSCAR) 5 MG tablet Take 1 tablet by mouth Daily. 90 tablet 3   • lisinopril (PRINIVIL,ZESTRIL) 10 MG tablet TAKE 1 TABLET BY MOUTH EVERY DAY 90 tablet 0   • tamsulosin (FLOMAX) 0.4 MG capsule 24 hr capsule Take 1 capsule by mouth Daily. 90 capsule 3     No facility-administered medications prior to visit.        Patient Active Problem List   Diagnosis   • Essential hypertension   • Benign non-nodular prostatic  "hyperplasia without lower urinary tract symptoms   • Mitral valve regurgitation   • Mitral valve prolapse   • Weight loss       Advance Care Planning:  has NO advance directive - information provided to the patient today    Identification of Risk Factors:  Risk factors include: weight .    Review of Systems    Compared to one year ago, the patient feels his physical health is the same.  Compared to one year ago, the patient feels his mental health is the same.    Objective     Physical Exam    Vitals:    05/17/18 0947   BP: 102/68   Pulse: 68   Resp: 16   SpO2: 98%   Weight: 51.7 kg (114 lb)   Height: 180.3 cm (71\")       Patient's Body mass index is 15.9 kg/m². BMI is below normal parameters. Recommendations include: treating the underlying disease process.      Assessment/Plan   Patient Self-Management and Personalized Health Advice  The patient has been provided with information about: weight management and preventive services including:   · Advance directive.    Visit Diagnoses:    ICD-10-CM ICD-9-CM   1. Weight loss R63.4 783.21       No orders of the defined types were placed in this encounter.      Outpatient Encounter Prescriptions as of 5/17/2018   Medication Sig Dispense Refill   • finasteride (PROSCAR) 5 MG tablet Take 1 tablet by mouth Daily. 90 tablet 3   • lisinopril (PRINIVIL,ZESTRIL) 10 MG tablet TAKE 1 TABLET BY MOUTH EVERY DAY 90 tablet 0   • tamsulosin (FLOMAX) 0.4 MG capsule 24 hr capsule Take 1 capsule by mouth Daily. 90 capsule 3     No facility-administered encounter medications on file as of 5/17/2018.        Reviewed use of high risk medication in the elderly: no  Reviewed for potential of harmful drug interactions in the elderly: no    Follow Up:  No Follow-up on file.     An After Visit Summary and PPPS with all of these plans were given to the patient.         "

## 2018-07-13 DIAGNOSIS — N40.1 BENIGN PROSTATIC HYPERPLASIA WITH URINARY OBSTRUCTION: ICD-10-CM

## 2018-07-13 DIAGNOSIS — N13.8 BENIGN PROSTATIC HYPERPLASIA WITH URINARY OBSTRUCTION: ICD-10-CM

## 2018-07-14 RX ORDER — FINASTERIDE 5 MG/1
5 TABLET, FILM COATED ORAL DAILY
Qty: 90 TABLET | Refills: 0 | Status: SHIPPED | OUTPATIENT
Start: 2018-07-14 | End: 2018-11-07 | Stop reason: SDUPTHER

## 2018-07-14 RX ORDER — TAMSULOSIN HYDROCHLORIDE 0.4 MG/1
1 CAPSULE ORAL DAILY
Qty: 90 CAPSULE | Refills: 0 | Status: SHIPPED | OUTPATIENT
Start: 2018-07-14 | End: 2018-11-07 | Stop reason: SDUPTHER

## 2018-07-17 RX ORDER — LISINOPRIL 10 MG/1
TABLET ORAL
Qty: 90 TABLET | Refills: 2 | Status: SHIPPED | OUTPATIENT
Start: 2018-07-17 | End: 2018-10-19 | Stop reason: ALTCHOICE

## 2018-07-19 ENCOUNTER — OFFICE VISIT (OUTPATIENT)
Dept: CARDIOLOGY | Facility: CLINIC | Age: 83
End: 2018-07-19

## 2018-07-19 VITALS
OXYGEN SATURATION: 98 % | BODY MASS INDEX: 14.84 KG/M2 | HEIGHT: 71 IN | RESPIRATION RATE: 18 BRPM | SYSTOLIC BLOOD PRESSURE: 95 MMHG | DIASTOLIC BLOOD PRESSURE: 60 MMHG | HEART RATE: 66 BPM | WEIGHT: 106 LBS

## 2018-07-19 DIAGNOSIS — I10 ESSENTIAL HYPERTENSION: Primary | ICD-10-CM

## 2018-07-19 DIAGNOSIS — I34.1 MITRAL VALVE PROLAPSE: ICD-10-CM

## 2018-07-19 DIAGNOSIS — R63.6 PATIENT UNDERWEIGHT: ICD-10-CM

## 2018-07-19 DIAGNOSIS — I34.0 NON-RHEUMATIC MITRAL REGURGITATION: ICD-10-CM

## 2018-07-19 PROCEDURE — 99213 OFFICE O/P EST LOW 20 MIN: CPT | Performed by: NURSE PRACTITIONER

## 2018-07-19 PROCEDURE — 93000 ELECTROCARDIOGRAM COMPLETE: CPT | Performed by: NURSE PRACTITIONER

## 2018-07-19 NOTE — PROGRESS NOTES
Subjective:     Encounter Date:07/19/2018      Patient ID: Roman Kenney is a 83 y.o. male. He presents today for three month follow-up of severe mitral valve regurgitation and mitral valve prolapse. He has a history of hypertension. He continues to deny chest pain, shortness of breath, palpitations, dizziness, syncope, orthopnea, PND, swelling or decreased stamina. He states that he continues to work out in the yard daily without issue. He states that his wife limits his activity out of concern that he is doing too much, but otherwise he has no decrease in activity level. Discussed possible treatments for severe mitral valve regurgitation, as well as signs and symptoms of worsening disease. He verbalized understanding and continues to be addiment that he is asymptomatic.      Chief Complaint:  Mitral Valve Prolapse   This is a chronic problem. The current episode started more than 1 year ago. The problem has been unchanged. Pertinent negatives include no abdominal pain, anorexia, arthralgias, change in bowel habit, chest pain, chills, congestion, coughing, diaphoresis, fatigue, fever, headaches, joint swelling, myalgias, nausea, neck pain, numbness, rash, sore throat, swollen glands, urinary symptoms, vertigo, visual change, vomiting or weakness.   Hypertension   This is a chronic problem. The current episode started more than 1 year ago. The problem is controlled. Pertinent negatives include no anxiety, blurred vision, chest pain, headaches, malaise/fatigue, neck pain, orthopnea, palpitations, peripheral edema, PND, shortness of breath or sweats. Risk factors for coronary artery disease include male gender. Current antihypertension treatment includes ACE inhibitors. The current treatment provides significant improvement.       The following portions of the patient's history were reviewed and updated as appropriate: allergies, current medications, past family history, past medical history, past social  history, past surgical history and problem list.     No Known Allergies     Current Outpatient Prescriptions:   •  finasteride (PROSCAR) 5 MG tablet, TAKE 1 TABLET BY MOUTH DAILY, Disp: 90 tablet, Rfl: 0  •  lisinopril (PRINIVIL,ZESTRIL) 10 MG tablet, TAKE 1 TABLET BY MOUTH EVERY DAY, Disp: 90 tablet, Rfl: 2  •  tamsulosin (FLOMAX) 0.4 MG capsule 24 hr capsule, TAKE 1 CAPSULE BY MOUTH DAILY, Disp: 90 capsule, Rfl: 0  Past Medical History:   Diagnosis Date   • BPH (benign prostatic hypertrophy)    • Epididymitis    • Hypertension    • Hypertension, essential    • Mitral valve regurgitation    • Undiagnosed cardiac murmurs    • Weight loss      Past Surgical History:   Procedure Laterality Date   • COLONOSCOPY      Colon Polyp Removal    • FINGER SURGERY Right     tumor removed from a finger     Family History   Problem Relation Age of Onset   • No Known Problems Mother    • Cancer Father    • Cancer Sister    • No Known Problems Brother    • No Known Problems Sister    • No Known Problems Sister    • No Known Problems Sister      Social History     Social History   • Marital status:      Spouse name: N/A   • Number of children: N/A   • Years of education: N/A     Occupational History   • Not on file.     Social History Main Topics   • Smoking status: Never Smoker   • Smokeless tobacco: Never Used   • Alcohol use No   • Drug use: No   • Sexual activity: Defer     Other Topics Concern   • Not on file     Social History Narrative   • No narrative on file         Review of Systems   Constitution: Negative for chills, decreased appetite, diaphoresis, fatigue, fever, weakness, malaise/fatigue, night sweats, weight gain and weight loss.   HENT: Negative for congestion, nosebleeds and sore throat.    Eyes: Negative for blurred vision and visual disturbance.   Cardiovascular: Negative for chest pain, dyspnea on exertion, leg swelling, near-syncope, orthopnea, palpitations, paroxysmal nocturnal dyspnea and syncope.  "  Respiratory: Negative for cough, hemoptysis, shortness of breath, snoring and wheezing.    Endocrine: Negative for cold intolerance and heat intolerance.   Hematologic/Lymphatic: Does not bruise/bleed easily.   Skin: Negative for rash.   Musculoskeletal: Negative for arthralgias, back pain, falls, joint swelling, myalgias and neck pain.   Gastrointestinal: Negative for abdominal pain, anorexia, change in bowel habit, constipation, diarrhea, dysphagia, heartburn, nausea and vomiting.   Genitourinary: Negative for hematuria.   Neurological: Negative for dizziness, headaches, light-headedness, numbness and vertigo.   Psychiatric/Behavioral: Negative for altered mental status.   Allergic/Immunologic: Negative for persistent infections.         ECG 12 Lead  Date/Time: 7/19/2018 10:42 AM  Performed by: REGAN MADERA  Authorized by: REGAN MADERA   Comparison: compared with previous ECG from 3/30/2018  Similar to previous ECG  Rhythm: sinus rhythm  Rate: normal  BPM: 66  Other findings: LVH  Clinical impression: abnormal ECG          BP 95/60 (BP Location: Right arm, Patient Position: Sitting, Cuff Size: Adult)   Pulse 66   Resp 18   Ht 180.3 cm (71\")   Wt 48.1 kg (106 lb)   SpO2 98%   BMI 14.78 kg/m²        Objective:     Physical Exam   Constitutional: He is oriented to person, place, and time. Vital signs are normal. He appears well-developed. He appears cachectic. No distress.   HENT:   Head: Normocephalic and atraumatic.   Right Ear: External ear normal.   Left Ear: External ear normal.   Eyes: Pupils are equal, round, and reactive to light. Conjunctivae are normal. Right eye exhibits no discharge. Left eye exhibits no discharge.   Neck: Normal range of motion. Neck supple. No JVD present. Carotid bruit is not present. No thyromegaly present.   Cardiovascular: Normal rate, regular rhythm and intact distal pulses.  PMI is not displaced.  Exam reveals no gallop, no friction rub and no decreased pulses.  "   Murmur heard.  High-pitched blowing holosystolic murmur is present with a grade of 3/6  at the apex  Pulses:       Radial pulses are 2+ on the right side, and 2+ on the left side.        Dorsalis pedis pulses are 2+ on the right side, and 2+ on the left side.        Posterior tibial pulses are 2+ on the right side, and 2+ on the left side.   Pulmonary/Chest: Effort normal and breath sounds normal. No respiratory distress. He has no decreased breath sounds. He has no wheezes. He has no rhonchi. He has no rales. He exhibits no tenderness.   Abdominal: Soft. Bowel sounds are normal. He exhibits no distension. There is no tenderness.   Musculoskeletal: Normal range of motion. He exhibits no edema.   Neurological: He is alert and oriented to person, place, and time.   Skin: Skin is warm and dry. No rash noted. He is not diaphoretic. No erythema. No pallor.   Psychiatric: He has a normal mood and affect. His behavior is normal. Judgment and thought content normal.   Vitals reviewed.        Assessment:          Diagnosis Plan   1. Essential hypertension  Well controlled.    2. Mitral valve prolapse     3. Non-rheumatic mitral regurgitation  Severe on echo 4/12/18. Continues to be asymptomatic. Will follow with at least yearly echo.    4. Patient underweight  Patient's Body mass index is 14.78 kg/m². BMI is below normal parameters. Recommendations include: educational materials.            Plan:       1. Continue medications as previously prescribed.  2. Report any worsening symptoms.   3. Follow up with PCP for blood pressure management and routine lab work.  4. Follow up with mid-level provider in 3 months, or sooner if needed. Discussed possible treatments for severe mitral valve regurgitation and signs and symptoms of worsening disease.

## 2018-07-19 NOTE — PATIENT INSTRUCTIONS

## 2018-10-08 DIAGNOSIS — N13.8 BENIGN PROSTATIC HYPERPLASIA WITH URINARY OBSTRUCTION: ICD-10-CM

## 2018-10-08 DIAGNOSIS — N40.1 BENIGN PROSTATIC HYPERPLASIA WITH URINARY OBSTRUCTION: ICD-10-CM

## 2018-10-08 RX ORDER — FINASTERIDE 5 MG/1
5 TABLET, FILM COATED ORAL DAILY
Qty: 90 TABLET | Refills: 0 | OUTPATIENT
Start: 2018-10-08

## 2018-10-08 RX ORDER — TAMSULOSIN HYDROCHLORIDE 0.4 MG/1
1 CAPSULE ORAL DAILY
Qty: 90 CAPSULE | Refills: 0 | OUTPATIENT
Start: 2018-10-08

## 2018-10-16 ENCOUNTER — OFFICE VISIT (OUTPATIENT)
Dept: FAMILY MEDICINE CLINIC | Facility: CLINIC | Age: 83
End: 2018-10-16

## 2018-10-16 VITALS
BODY MASS INDEX: 14.98 KG/M2 | WEIGHT: 107 LBS | DIASTOLIC BLOOD PRESSURE: 60 MMHG | HEART RATE: 67 BPM | RESPIRATION RATE: 16 BRPM | SYSTOLIC BLOOD PRESSURE: 92 MMHG | HEIGHT: 71 IN | OXYGEN SATURATION: 97 %

## 2018-10-16 DIAGNOSIS — Z12.5 ENCOUNTER FOR SCREENING FOR MALIGNANT NEOPLASM OF PROSTATE: ICD-10-CM

## 2018-10-16 DIAGNOSIS — I10 ESSENTIAL HYPERTENSION: Primary | ICD-10-CM

## 2018-10-16 DIAGNOSIS — N40.0 BENIGN NON-NODULAR PROSTATIC HYPERPLASIA WITHOUT LOWER URINARY TRACT SYMPTOMS: ICD-10-CM

## 2018-10-16 PROCEDURE — 99213 OFFICE O/P EST LOW 20 MIN: CPT | Performed by: FAMILY MEDICINE

## 2018-10-16 NOTE — PROGRESS NOTES
"Subjective   Roman Kneney is a 83 y.o. male.     Chief Complaint   Patient presents with   • Follow-up     6 mo   wt loss        History of Present Illness     no new complaints..      Current Outpatient Prescriptions:   •  finasteride (PROSCAR) 5 MG tablet, TAKE 1 TABLET BY MOUTH DAILY, Disp: 90 tablet, Rfl: 0  •  lisinopril (PRINIVIL,ZESTRIL) 10 MG tablet, TAKE 1 TABLET BY MOUTH EVERY DAY, Disp: 90 tablet, Rfl: 2  •  tamsulosin (FLOMAX) 0.4 MG capsule 24 hr capsule, TAKE 1 CAPSULE BY MOUTH DAILY, Disp: 90 capsule, Rfl: 0  No Known Allergies    Past Medical History:   Diagnosis Date   • BPH (benign prostatic hypertrophy)    • Epididymitis    • Hypertension    • Hypertension, essential    • Mitral valve regurgitation    • Undiagnosed cardiac murmurs    • Weight loss      Past Surgical History:   Procedure Laterality Date   • COLONOSCOPY      Colon Polyp Removal    • FINGER SURGERY Right     tumor removed from a finger       Review of Systems   Constitutional: Negative.    HENT: Negative.    Eyes: Negative.    Respiratory: Negative.    Cardiovascular: Negative.    Gastrointestinal: Negative.    Endocrine: Negative.    Genitourinary: Negative.    Musculoskeletal: Negative.    Skin: Negative.    Allergic/Immunologic: Negative.    Neurological: Negative.    Hematological: Negative.    Psychiatric/Behavioral: Negative.        Objective  BP 92/60   Pulse 67   Resp 16   Ht 180.3 cm (71\")   Wt 48.5 kg (107 lb)   SpO2 97%   BMI 14.92 kg/m²   Physical Exam   Constitutional: He is oriented to person, place, and time. He appears well-developed and well-nourished.   HENT:   Head: Normocephalic and atraumatic.   Eyes: Pupils are equal, round, and reactive to light. Conjunctivae and EOM are normal.   Neck: Normal range of motion. Neck supple.   Cardiovascular: Normal rate, regular rhythm, normal heart sounds and intact distal pulses.    Pulmonary/Chest: Effort normal and breath sounds normal.   Abdominal: Soft. Bowel " sounds are normal.   Musculoskeletal: Normal range of motion.   Neurological: He is alert and oriented to person, place, and time.   Skin: Skin is warm and dry. Capillary refill takes less than 2 seconds.   Psychiatric: He has a normal mood and affect. His behavior is normal. Judgment and thought content normal.   Nursing note and vitals reviewed.      Assessment/Plan   Roman was seen today for follow-up.    Diagnoses and all orders for this visit:    Essential hypertension  -     CBC w AUTO Differential  -     Comprehensive metabolic panel    Benign non-nodular prostatic hyperplasia without lower urinary tract symptoms  -     PSA SCREENING    Encounter for screening for malignant neoplasm of prostate   -     PSA SCREENING    Patient's Body mass index is 14.92 kg/m². BMI is below normal parameters. Recommendations include: treating the underlying disease process.    Stop prinivil  And monitor bp       Orders Placed This Encounter   Procedures   • Comprehensive metabolic panel   • PSA SCREENING   • CBC w AUTO Differential     Order Specific Question:   Manual Differential     Answer:   No     Current outpatient and discharge medications have been reconciled for the patient.  Reviewed by: Yariel Hancock MD  Follow up: 4 month(s)

## 2018-10-17 LAB
ALBUMIN SERPL-MCNC: 4.3 G/DL (ref 3.5–5)
ALBUMIN/GLOB SERPL: 1.3 G/DL (ref 1.1–2.5)
ALP SERPL-CCNC: 89 U/L (ref 24–120)
ALT SERPL-CCNC: 26 U/L (ref 0–54)
AST SERPL-CCNC: 32 U/L (ref 7–45)
BASOPHILS # BLD AUTO: 0.02 10*3/MM3 (ref 0–0.2)
BASOPHILS NFR BLD AUTO: 0.3 % (ref 0–2)
BILIRUB SERPL-MCNC: 0.6 MG/DL (ref 0.1–1)
BUN SERPL-MCNC: 28 MG/DL (ref 5–21)
BUN/CREAT SERPL: 18.3 (ref 7–25)
CALCIUM SERPL-MCNC: 10 MG/DL (ref 8.4–10.4)
CHLORIDE SERPL-SCNC: 104 MMOL/L (ref 98–110)
CO2 SERPL-SCNC: 25 MMOL/L (ref 24–31)
CREAT SERPL-MCNC: 1.53 MG/DL (ref 0.5–1.4)
EOSINOPHIL # BLD AUTO: 0.05 10*3/MM3 (ref 0–0.7)
EOSINOPHIL NFR BLD AUTO: 0.7 % (ref 0–4)
ERYTHROCYTE [DISTWIDTH] IN BLOOD BY AUTOMATED COUNT: 13.2 % (ref 12–15)
GLOBULIN SER CALC-MCNC: 3.3 GM/DL
GLUCOSE SERPL-MCNC: 90 MG/DL (ref 70–100)
HCT VFR BLD AUTO: 37.1 % (ref 40–52)
HGB BLD-MCNC: 12.1 G/DL (ref 14–18)
IMM GRANULOCYTES # BLD: 0.04 10*3/MM3 (ref 0–0.03)
IMM GRANULOCYTES NFR BLD: 0.6 % (ref 0–5)
LYMPHOCYTES # BLD AUTO: 1.87 10*3/MM3 (ref 0.72–4.86)
LYMPHOCYTES NFR BLD AUTO: 27.6 % (ref 15–45)
MCH RBC QN AUTO: 30.6 PG (ref 28–32)
MCHC RBC AUTO-ENTMCNC: 32.6 G/DL (ref 33–36)
MCV RBC AUTO: 93.7 FL (ref 82–95)
MONOCYTES # BLD AUTO: 0.52 10*3/MM3 (ref 0.19–1.3)
MONOCYTES NFR BLD AUTO: 7.7 % (ref 4–12)
NEUTROPHILS # BLD AUTO: 4.28 10*3/MM3 (ref 1.87–8.4)
NEUTROPHILS NFR BLD AUTO: 63.1 % (ref 39–78)
NRBC BLD AUTO-RTO: 0 /100 WBC (ref 0–0)
PLATELET # BLD AUTO: 162 10*3/MM3 (ref 130–400)
POTASSIUM SERPL-SCNC: 4.8 MMOL/L (ref 3.5–5.3)
PROT SERPL-MCNC: 7.6 G/DL (ref 6.3–8.7)
PSA SERPL-MCNC: 4.95 NG/ML (ref 0–4)
RBC # BLD AUTO: 3.96 10*6/MM3 (ref 4.8–5.9)
SODIUM SERPL-SCNC: 140 MMOL/L (ref 135–145)
WBC # BLD AUTO: 6.78 10*3/MM3 (ref 4.8–10.8)

## 2018-10-19 ENCOUNTER — OFFICE VISIT (OUTPATIENT)
Dept: CARDIOLOGY | Facility: CLINIC | Age: 83
End: 2018-10-19

## 2018-10-19 VITALS
HEIGHT: 71 IN | SYSTOLIC BLOOD PRESSURE: 115 MMHG | BODY MASS INDEX: 15.54 KG/M2 | RESPIRATION RATE: 18 BRPM | WEIGHT: 111 LBS | HEART RATE: 64 BPM | OXYGEN SATURATION: 97 % | DIASTOLIC BLOOD PRESSURE: 75 MMHG

## 2018-10-19 DIAGNOSIS — I34.1 MITRAL VALVE PROLAPSE: ICD-10-CM

## 2018-10-19 DIAGNOSIS — I10 ESSENTIAL HYPERTENSION: ICD-10-CM

## 2018-10-19 DIAGNOSIS — R63.6 PATIENT UNDERWEIGHT: ICD-10-CM

## 2018-10-19 DIAGNOSIS — I34.0 NON-RHEUMATIC MITRAL REGURGITATION: Primary | ICD-10-CM

## 2018-10-19 PROCEDURE — 93000 ELECTROCARDIOGRAM COMPLETE: CPT | Performed by: NURSE PRACTITIONER

## 2018-10-19 PROCEDURE — 99214 OFFICE O/P EST MOD 30 MIN: CPT | Performed by: NURSE PRACTITIONER

## 2018-10-19 NOTE — PATIENT INSTRUCTIONS

## 2018-10-19 NOTE — PROGRESS NOTES
Subjective:     Encounter Date:10/19/2018      Patient ID: Roman Kenney is a 83 y.o. male with a history of HTN, MVP, and severe MR per echo.  He presents for routine follow up today.    Chief Complaint: Follow up valve disease  Mitral Valve Prolapse   This is a chronic problem. The current episode started more than 1 year ago. Associated symptoms include anorexia. Pertinent negatives include no abdominal pain, chest pain, chills, congestion, coughing, diaphoresis, fatigue, fever, nausea, vomiting or weakness.   Hypertension   This is a chronic problem. The current episode started more than 1 year ago. The problem is controlled. Pertinent negatives include no anxiety, chest pain, malaise/fatigue, orthopnea, palpitations, peripheral edema, PND or shortness of breath. Risk factors for coronary artery disease include male gender. Past treatments include ACE inhibitors. Current antihypertension treatment includes alpha 1 blockers. There are no compliance problems.  There is no history of angina or CAD/MI.     Mr. Kenney presents today for follow up. He has no complaints. He has known severe valvular disease and continues to state that he has had no side effects or symptoms related to this. He is a thin, frail gentleman. He notes no changes in the last 3 months. He denies near syncope, syncope, dizziness, lightheadedness.  He denies shortness of breath, dyspnea with exertion, or fatigue. He has hx of htn and is on mediation for prostate health and has had to stop his acei due to low BP, but blood pressure Is well controlled.     The following portions of the patient's history were reviewed and updated as appropriate: allergies, current medications, past family history, past medical history, past social history and past surgical history.     No Known Allergies      Current Outpatient Prescriptions:   •  finasteride (PROSCAR) 5 MG tablet, TAKE 1 TABLET BY MOUTH DAILY, Disp: 90 tablet, Rfl: 0  •  tamsulosin  (FLOMAX) 0.4 MG capsule 24 hr capsule, TAKE 1 CAPSULE BY MOUTH DAILY, Disp: 90 capsule, Rfl: 0    Past Medical History:   Diagnosis Date   • BPH (benign prostatic hypertrophy)    • Epididymitis    • Hypertension    • Hypertension, essential    • Mitral valve regurgitation    • Undiagnosed cardiac murmurs    • Weight loss      Family History   Problem Relation Age of Onset   • No Known Problems Mother    • Cancer Father    • Cancer Sister    • No Known Problems Brother    • No Known Problems Sister    • No Known Problems Sister    • No Known Problems Sister      Social History     Social History   • Marital status:      Spouse name: N/A   • Number of children: N/A   • Years of education: N/A     Occupational History   • Not on file.     Social History Main Topics   • Smoking status: Never Smoker   • Smokeless tobacco: Never Used   • Alcohol use No   • Drug use: No   • Sexual activity: Defer     Other Topics Concern   • Not on file     Social History Narrative   • No narrative on file     Past Surgical History:   Procedure Laterality Date   • COLONOSCOPY      Colon Polyp Removal    • FINGER SURGERY Right     tumor removed from a finger     Review of Systems   Constitution: Negative for chills, diaphoresis, fatigue, fever, weakness, malaise/fatigue, weight gain and weight loss.   HENT: Negative for congestion.    Cardiovascular: Negative for chest pain, dyspnea on exertion, leg swelling, near-syncope, orthopnea, palpitations, paroxysmal nocturnal dyspnea and syncope.   Respiratory: Negative for cough, shortness of breath and wheezing.    Hematologic/Lymphatic: Negative for bleeding problem.   Skin: Negative for dry skin, flushing and itching.   Musculoskeletal: Negative for falls.   Gastrointestinal: Positive for anorexia. Negative for bloating, abdominal pain, nausea and vomiting.   Neurological: Negative for dizziness, light-headedness and loss of balance.   Psychiatric/Behavioral: Negative for altered mental  status. The patient is not nervous/anxious.    All other systems reviewed and are negative.      ECG 12 Lead  Date/Time: 10/19/2018 8:57 AM  Performed by: TK GREEN  Authorized by: TK GREEN   Comparison: compared with previous ECG from 7/19/2018  Similar to previous ECG  Rhythm: sinus rhythm  Rate: normal  BPM: 64  Conduction: non-specific intraventricular conduction delay  ST Segments: ST segments normal  T Waves: T waves normal  QRS axis: normal  Other findings: LVH and PRWP  Clinical impression: abnormal ECG          Vitals:    10/19/18 0832   BP: 115/75   Pulse: 64   Resp: 18   SpO2: 97%     1    10/19/18  0832   Weight: 50.3 kg (111 lb)          Objective:     Physical Exam   Constitutional: He is oriented to person, place, and time. Vital signs are normal. He appears well-developed. He appears cachectic. He is cooperative. No distress.   HENT:   Head: Normocephalic and atraumatic.   Mouth/Throat: No oropharyngeal exudate.   Eyes: Conjunctivae are normal. Right eye exhibits no discharge. Left eye exhibits no discharge.   Neck: Normal range of motion. Neck supple.   Cardiovascular: Normal rate and regular rhythm.  Exam reveals no gallop and no friction rub.    Murmur heard.  Pulmonary/Chest: Effort normal and breath sounds normal. No respiratory distress. He has no wheezes. He has no rhonchi. He has no rales.   Abdominal: Soft. Normal appearance. He exhibits no distension. There is no tenderness.   Musculoskeletal: Normal range of motion. He exhibits no edema.   Neurological: He is alert and oriented to person, place, and time.   Skin: Skin is warm, dry and intact. No rash noted. He is not diaphoretic. No erythema. No pallor.   Psychiatric: He has a normal mood and affect. His speech is normal and behavior is normal. His mood appears not anxious. His affect is not angry. He does not exhibit a depressed mood.   Vitals reviewed.      Lab Review:   Echo  Interpretation Summary   10.19.2018  · Left  atrial cavity size is moderately dilated.  · There is moderate prolapse of the posterior leaflet present.  · Severe mitral valve regurgitation is present  · Mild tricuspid valve regurgitation is present.           Assessment:          Diagnosis Plan   1. Non-rheumatic mitral regurgitation     2. Mitral valve prolapse     3. Essential hypertension     4. Patient underweight            Plan:       - MR: severe per echo, without symptoms. He does not have a single symptom and has not. He continues to be able to do his daily activities without complaint.     - MVP: known. Per echo.    - HTN: Controlled    - Underweight: BMI 15.     Follow up in ~ 3 months for routine follow up of symptoms.

## 2018-10-30 ENCOUNTER — TELEPHONE (OUTPATIENT)
Dept: UROLOGY | Facility: CLINIC | Age: 83
End: 2018-10-30

## 2018-11-01 NOTE — PROGRESS NOTES
Subjective    Mr. Kenney is 83 y.o. male    Chief Complaint: BPH/Renal Mass    History of Present Illness     Benign Prostatic Hypertrophy  Patient complains of lower urinary tract symptoms. He reports frequency, incomplete emptying, intermittency, nocturia one time a night and weak stream. He denies straining and urgency. Patient states symptoms are of mild severity. Onset of symptoms was several years ago and was gradual in onset. His AUA Symptom Score is, 11/35.He reports a history of no complicating symptoms. He denies flank pain, gross hematuria, kidney stones and recurrent UTI.  Patient has tried Alpha blockers and 5 alpha reductase inhibitors with improvement.  .    The following portions of the patient's history were reviewed and updated as appropriate: allergies, current medications, past family history, past medical history, past social history, past surgical history and problem list.    Lab Results   Component Value Date    PSA 4.950 (H) 10/16/2018    PSA 4.090 (H) 09/19/2017     Renal Mass  Patient here for evaluation of renal mass.  The renal mass was found incidentally.  The location of the mass is the right kidney.  The mass has been present for 1 year.  The mass is described as complex.  The size of the mass is 1.5cm.  It would be classifed as a Bosniak I.  The mass was found on evaluation of decreased GFR.  Associated symptoms include none.    Review of Systems   Constitutional: Negative for appetite change, chills, fever and unexpected weight change.   HENT: Negative for congestion, ear pain, facial swelling, hearing loss, nosebleeds, trouble swallowing and voice change.    Eyes: Negative for photophobia, pain, discharge and visual disturbance.   Respiratory: Negative for cough, choking, chest tightness and shortness of breath.    Cardiovascular: Negative for chest pain and palpitations.   Gastrointestinal: Negative for abdominal distention, abdominal pain, blood in stool, constipation,  diarrhea, nausea and vomiting.   Endocrine: Negative for cold intolerance, heat intolerance and polydipsia.   Genitourinary: Negative for decreased urine volume, difficulty urinating, discharge, dysuria, enuresis, flank pain, frequency, genital sores, hematuria, penile pain, penile swelling, scrotal swelling, testicular pain and urgency.   Musculoskeletal: Negative for arthralgias, joint swelling, neck pain and neck stiffness.   Skin: Negative for pallor and rash.   Allergic/Immunologic: Negative for immunocompromised state.   Neurological: Negative for dizziness, tremors, seizures, syncope, light-headedness and headaches.   Hematological: Negative for adenopathy. Does not bruise/bleed easily.   Psychiatric/Behavioral: Negative for agitation, confusion, dysphoric mood, hallucinations, self-injury and suicidal ideas.         Current Outpatient Prescriptions:   •  finasteride (PROSCAR) 5 MG tablet, Take 1 tablet by mouth Daily., Disp: 90 tablet, Rfl: 3  •  tamsulosin (FLOMAX) 0.4 MG capsule 24 hr capsule, Take 1 capsule by mouth Daily., Disp: 90 capsule, Rfl: 3    Past Medical History:   Diagnosis Date   • BPH (benign prostatic hypertrophy)    • Epididymitis    • Hypertension    • Hypertension, essential    • Mitral valve regurgitation    • Undiagnosed cardiac murmurs    • Weight loss        Past Surgical History:   Procedure Laterality Date   • COLONOSCOPY      Colon Polyp Removal    • FINGER SURGERY Right     tumor removed from a finger       Social History     Social History   • Marital status:      Social History Main Topics   • Smoking status: Never Smoker   • Smokeless tobacco: Never Used   • Alcohol use No   • Drug use: No   • Sexual activity: Defer     Other Topics Concern   • Not on file       Family History   Problem Relation Age of Onset   • No Known Problems Mother    • Cancer Father    • Cancer Sister    • No Known Problems Brother    • No Known Problems Sister    • No Known Problems Sister    • No  "Known Problems Sister        Objective    /75   Temp 98.5 °F (36.9 °C)   Ht 180.3 cm (71\")   Wt 52.2 kg (115 lb)   BMI 16.04 kg/m²     Physical Exam   Constitutional: He is oriented to person, place, and time. He appears well-developed and well-nourished. No distress.   Pulmonary/Chest: Effort normal.   Abdominal: Soft. He exhibits no distension and no mass. There is no tenderness. There is no rebound and no guarding. No hernia.   Neurological: He is alert and oriented to person, place, and time.   Skin: Skin is warm and dry. He is not diaphoretic.   Psychiatric: He has a normal mood and affect.   Vitals reviewed.          Results for orders placed or performed in visit on 10/16/18   Comprehensive metabolic panel   Result Value Ref Range    Glucose 90 70 - 100 mg/dL    BUN 28 (H) 5 - 21 mg/dL    Creatinine 1.53 (H) 0.50 - 1.40 mg/dL    eGFR Non African Am 44 (L) >60 mL/min/1.73    eGFR African Am 53 (L) >60 mL/min/1.73    BUN/Creatinine Ratio 18.3 7.0 - 25.0    Sodium 140 135 - 145 mmol/L    Potassium 4.8 3.5 - 5.3 mmol/L    Chloride 104 98 - 110 mmol/L    Total CO2 25.0 24.0 - 31.0 mmol/L    Calcium 10.0 8.4 - 10.4 mg/dL    Total Protein 7.6 6.3 - 8.7 g/dL    Albumin 4.30 3.50 - 5.00 g/dL    Globulin 3.3 gm/dL    A/G Ratio 1.3 1.1 - 2.5 g/dL    Total Bilirubin 0.6 0.1 - 1.0 mg/dL    Alkaline Phosphatase 89 24 - 120 U/L    AST (SGOT) 32 7 - 45 U/L    ALT (SGPT) 26 0 - 54 U/L   PSA SCREENING   Result Value Ref Range    PSA 4.950 (H) 0.000 - 4.000 ng/mL   CBC w AUTO Differential   Result Value Ref Range    WBC 6.78 4.80 - 10.80 10*3/mm3    RBC 3.96 (L) 4.80 - 5.90 10*6/mm3    Hemoglobin 12.1 (L) 14.0 - 18.0 g/dL    Hematocrit 37.1 (L) 40.0 - 52.0 %    MCV 93.7 82.0 - 95.0 fL    MCH 30.6 28.0 - 32.0 pg    MCHC 32.6 (L) 33.0 - 36.0 g/dL    RDW 13.2 12.0 - 15.0 %    Platelets 162 130 - 400 10*3/mm3    Neutrophil Rel % 63.1 39.0 - 78.0 %    Lymphocyte Rel % 27.6 15.0 - 45.0 %    Monocyte Rel % 7.7 4.0 - 12.0 % "    Eosinophil Rel % 0.7 0.0 - 4.0 %    Basophil Rel % 0.3 0.0 - 2.0 %    Neutrophils Absolute 4.28 1.87 - 8.40 10*3/mm3    Lymphocytes Absolute 1.87 0.72 - 4.86 10*3/mm3    Monocytes Absolute 0.52 0.19 - 1.30 10*3/mm3    Eosinophils Absolute 0.05 0.00 - 0.70 10*3/mm3    Basophils Absolute 0.02 0.00 - 0.20 10*3/mm3    Immature Granulocyte Rel % 0.6 0.0 - 5.0 %    Immature Grans Absolute 0.04 (H) 0.00 - 0.03 10*3/mm3    nRBC 0.0 0.0 - 0.0 /100 WBC   Estimation of residual urine via abdominal ultrasound  Residual Urine: 498 ml  Indication: BPH  Position: Supine  Examination: Incremental scanning of the suprapubic area using 3 MHz transducer using copious amounts of acoustic gel.   Findings: An anechoic area was demonstrated which represented the bladder, with measurement of residual urine as noted. I inspected this myself. In that the residual urine was stable or insignificant, no treatment will be necessary at this time.     Patient's Body mass index is 16.04 kg/m². BMI is below normal parameters. Recommendations include: no follow-up required.        Assessment and Plan    Diagnoses and all orders for this visit:    Renal cyst    Benign prostatic hyperplasia with urinary obstruction  -     finasteride (PROSCAR) 5 MG tablet; Take 1 tablet by mouth Daily.  -     tamsulosin (FLOMAX) 0.4 MG capsule 24 hr capsule; Take 1 capsule by mouth Daily.    Retention, urine    Other orders  -     Cancel: POC Urinalysis Dipstick, Multipro    Patient with a history of simple cysts.    He also has a history of BPH and is on dual therapy.  He does have some high residuals but is not bothered by this and his AUA symptom score is 11/35.  Regarding his PSA I would not check this again due to his age.

## 2018-11-07 ENCOUNTER — OFFICE VISIT (OUTPATIENT)
Dept: UROLOGY | Facility: CLINIC | Age: 83
End: 2018-11-07

## 2018-11-07 VITALS
DIASTOLIC BLOOD PRESSURE: 75 MMHG | BODY MASS INDEX: 16.1 KG/M2 | TEMPERATURE: 98.5 F | WEIGHT: 115 LBS | SYSTOLIC BLOOD PRESSURE: 120 MMHG | HEIGHT: 71 IN

## 2018-11-07 DIAGNOSIS — R33.9 RETENTION, URINE: ICD-10-CM

## 2018-11-07 DIAGNOSIS — N28.1 RENAL CYST: Primary | ICD-10-CM

## 2018-11-07 DIAGNOSIS — N13.8 BENIGN PROSTATIC HYPERPLASIA WITH URINARY OBSTRUCTION: ICD-10-CM

## 2018-11-07 DIAGNOSIS — N40.1 BENIGN PROSTATIC HYPERPLASIA WITH URINARY OBSTRUCTION: ICD-10-CM

## 2018-11-07 PROCEDURE — 99213 OFFICE O/P EST LOW 20 MIN: CPT | Performed by: UROLOGY

## 2018-11-07 PROCEDURE — 51798 US URINE CAPACITY MEASURE: CPT | Performed by: UROLOGY

## 2018-11-07 RX ORDER — TAMSULOSIN HYDROCHLORIDE 0.4 MG/1
1 CAPSULE ORAL DAILY
Qty: 90 CAPSULE | Refills: 3 | Status: SHIPPED | OUTPATIENT
Start: 2018-11-07 | End: 2019-11-09 | Stop reason: SDUPTHER

## 2018-11-07 RX ORDER — FINASTERIDE 5 MG/1
5 TABLET, FILM COATED ORAL DAILY
Qty: 90 TABLET | Refills: 3 | Status: SHIPPED | OUTPATIENT
Start: 2018-11-07 | End: 2019-11-09 | Stop reason: SDUPTHER

## 2018-11-07 NOTE — PATIENT INSTRUCTIONS

## 2019-02-19 ENCOUNTER — OFFICE VISIT (OUTPATIENT)
Dept: CARDIOLOGY | Facility: CLINIC | Age: 84
End: 2019-02-19

## 2019-02-19 ENCOUNTER — OFFICE VISIT (OUTPATIENT)
Dept: FAMILY MEDICINE CLINIC | Facility: CLINIC | Age: 84
End: 2019-02-19

## 2019-02-19 VITALS
DIASTOLIC BLOOD PRESSURE: 86 MMHG | HEIGHT: 71 IN | HEART RATE: 93 BPM | RESPIRATION RATE: 16 BRPM | SYSTOLIC BLOOD PRESSURE: 136 MMHG | WEIGHT: 115 LBS | BODY MASS INDEX: 16.1 KG/M2 | OXYGEN SATURATION: 96 %

## 2019-02-19 VITALS
DIASTOLIC BLOOD PRESSURE: 78 MMHG | BODY MASS INDEX: 15.68 KG/M2 | OXYGEN SATURATION: 98 % | HEIGHT: 71 IN | HEART RATE: 84 BPM | WEIGHT: 112 LBS | SYSTOLIC BLOOD PRESSURE: 108 MMHG

## 2019-02-19 DIAGNOSIS — I34.0 NON-RHEUMATIC MITRAL REGURGITATION: ICD-10-CM

## 2019-02-19 DIAGNOSIS — I10 ESSENTIAL HYPERTENSION: Primary | ICD-10-CM

## 2019-02-19 DIAGNOSIS — I34.1 MITRAL VALVE PROLAPSE: ICD-10-CM

## 2019-02-19 DIAGNOSIS — N40.0 BENIGN NON-NODULAR PROSTATIC HYPERPLASIA WITHOUT LOWER URINARY TRACT SYMPTOMS: Primary | ICD-10-CM

## 2019-02-19 DIAGNOSIS — R63.6 PATIENT UNDERWEIGHT: ICD-10-CM

## 2019-02-19 PROCEDURE — 99213 OFFICE O/P EST LOW 20 MIN: CPT | Performed by: NURSE PRACTITIONER

## 2019-02-19 PROCEDURE — 99213 OFFICE O/P EST LOW 20 MIN: CPT | Performed by: FAMILY MEDICINE

## 2019-02-19 NOTE — PROGRESS NOTES
"Subjective   Roman Kenney is a 83 y.o. male.     Chief Complaint   Patient presents with   • Follow-up     4 mo   htn        History of Present Illness     he feels he is doing well with urination---he denies any cp or sob in regards to his mv regurgitaion      Current Outpatient Medications:   •  finasteride (PROSCAR) 5 MG tablet, Take 1 tablet by mouth Daily., Disp: 90 tablet, Rfl: 3  •  tamsulosin (FLOMAX) 0.4 MG capsule 24 hr capsule, Take 1 capsule by mouth Daily., Disp: 90 capsule, Rfl: 3  No Known Allergies    Past Medical History:   Diagnosis Date   • BPH (benign prostatic hypertrophy)    • Epididymitis    • Hypertension    • Hypertension, essential    • Mitral valve regurgitation    • Undiagnosed cardiac murmurs    • Weight loss      Past Surgical History:   Procedure Laterality Date   • COLONOSCOPY      Colon Polyp Removal    • FINGER SURGERY Right     tumor removed from a finger       Review of Systems   Constitutional: Negative.    HENT: Negative.    Eyes: Negative.    Respiratory: Negative.    Cardiovascular: Negative.    Gastrointestinal: Negative.    Endocrine: Negative.    Genitourinary: Negative.    Musculoskeletal: Negative.    Skin: Negative.    Allergic/Immunologic: Negative.    Neurological: Negative.    Hematological: Negative.    Psychiatric/Behavioral: Negative.        Objective  /86   Pulse 93   Resp 16   Ht 180.3 cm (71\")   Wt 52.2 kg (115 lb)   SpO2 96%   BMI 16.04 kg/m²   Physical Exam   Constitutional: He is oriented to person, place, and time. He appears well-developed and well-nourished.   HENT:   Head: Normocephalic and atraumatic.   Right Ear: External ear normal.   Left Ear: External ear normal.   Nose: Nose normal.   Mouth/Throat: Oropharynx is clear and moist.   Eyes: Conjunctivae and EOM are normal. Pupils are equal, round, and reactive to light.   Neck: Normal range of motion. Neck supple.   Cardiovascular: Normal rate, regular rhythm, normal heart sounds and " intact distal pulses.   Pulmonary/Chest: Effort normal and breath sounds normal.   Abdominal: Soft. Bowel sounds are normal.   Musculoskeletal: Normal range of motion.   Neurological: He is alert and oriented to person, place, and time.   Skin: Skin is warm. Capillary refill takes less than 2 seconds.   Psychiatric: He has a normal mood and affect. His behavior is normal. Judgment and thought content normal.   Nursing note and vitals reviewed.      Assessment/Plan   Roman was seen today for follow-up.    Diagnoses and all orders for this visit:    Benign non-nodular prostatic hyperplasia without lower urinary tract symptoms    Non-rheumatic mitral regurgitation                 No orders of the defined types were placed in this encounter.      Follow up: 6 month(s)

## 2019-02-19 NOTE — PROGRESS NOTES
Subjective:     Encounter Date:02/19/2019      Patient ID: Roman Kenney is a 83 y.o. male with a history of HTN, MVP, and severe MR per echo.  He presents for routine follow up today.    Chief Complaint: Follow up valve disease  Mitral Valve Prolapse   This is a chronic problem. The current episode started more than 1 year ago. Associated symptoms include anorexia. Pertinent negatives include no abdominal pain, chest pain, chills, congestion, coughing, diaphoresis, fatigue, fever, nausea, vomiting or weakness.   Hypertension   This is a chronic problem. The current episode started more than 1 year ago. The problem is controlled. Pertinent negatives include no anxiety, chest pain, malaise/fatigue, orthopnea, palpitations, peripheral edema, PND or shortness of breath. Risk factors for coronary artery disease include male gender. Past treatments include ACE inhibitors. Current antihypertension treatment includes alpha 1 blockers. There are no compliance problems.  There is no history of angina or CAD/MI.     Mr. Kenney presents today for follow up. He has no complaints. He has known severe valvular disease and continues to state that he has had no side effects or symptoms related to this. He is a thin, frail gentleman. He notes no changes in the last 3 months. He denies near syncope, syncope, dizziness, lightheadedness.  He denies shortness of breath, dyspnea with exertion, or fatigue. He has hx of htn and is on mediation for prostate health and has had to stop his acei due to low BP, but blood pressure Is well controlled. His stamina is unchanged and he feels well.     The following portions of the patient's history were reviewed and updated as appropriate: allergies, current medications, past family history, past medical history, past social history and past surgical history.     No Known Allergies      Current Outpatient Medications:   •  finasteride (PROSCAR) 5 MG tablet, Take 1 tablet by mouth  Daily., Disp: 90 tablet, Rfl: 3  •  tamsulosin (FLOMAX) 0.4 MG capsule 24 hr capsule, Take 1 capsule by mouth Daily., Disp: 90 capsule, Rfl: 3    Past Medical History:   Diagnosis Date   • BPH (benign prostatic hypertrophy)    • Epididymitis    • Hypertension    • Hypertension, essential    • Mitral valve regurgitation    • Undiagnosed cardiac murmurs    • Weight loss      Family History   Problem Relation Age of Onset   • No Known Problems Mother    • Cancer Father    • Cancer Sister    • No Known Problems Brother    • No Known Problems Sister    • No Known Problems Sister    • No Known Problems Sister      Social History     Socioeconomic History   • Marital status:      Spouse name: Not on file   • Number of children: Not on file   • Years of education: Not on file   • Highest education level: Not on file   Social Needs   • Financial resource strain: Not on file   • Food insecurity - worry: Not on file   • Food insecurity - inability: Not on file   • Transportation needs - medical: Not on file   • Transportation needs - non-medical: Not on file   Occupational History   • Not on file   Tobacco Use   • Smoking status: Never Smoker   • Smokeless tobacco: Never Used   Substance and Sexual Activity   • Alcohol use: No   • Drug use: No   • Sexual activity: Defer   Other Topics Concern   • Not on file   Social History Narrative   • Not on file     Past Surgical History:   Procedure Laterality Date   • COLONOSCOPY      Colon Polyp Removal    • FINGER SURGERY Right     tumor removed from a finger     Review of Systems   Constitution: Negative for chills, diaphoresis, fatigue, fever, weakness, malaise/fatigue, weight gain and weight loss.   HENT: Negative for congestion.    Cardiovascular: Negative for chest pain, dyspnea on exertion, leg swelling, near-syncope, orthopnea, palpitations, paroxysmal nocturnal dyspnea and syncope.   Respiratory: Negative for cough, shortness of breath and wheezing.   "  Hematologic/Lymphatic: Negative for bleeding problem.   Skin: Negative for dry skin, flushing and itching.   Musculoskeletal: Negative for falls.   Gastrointestinal: Positive for anorexia. Negative for bloating, abdominal pain, nausea and vomiting.   Neurological: Negative for dizziness, light-headedness and loss of balance.   Psychiatric/Behavioral: Negative for altered mental status. The patient is not nervous/anxious.    All other systems reviewed and are negative.    Procedures  /78   Pulse 84   Ht 180.3 cm (71\")   Wt 50.8 kg (112 lb)   SpO2 98%   BMI 15.62 kg/m²        Objective:     Physical Exam   Constitutional: He is oriented to person, place, and time. Vital signs are normal. He appears well-developed. He appears cachectic. He is cooperative. No distress.   HENT:   Head: Normocephalic and atraumatic.   Mouth/Throat: No oropharyngeal exudate.   Eyes: Conjunctivae are normal. Right eye exhibits no discharge. Left eye exhibits no discharge.   Neck: Normal range of motion. Neck supple.   Cardiovascular: Normal rate and regular rhythm. Exam reveals no gallop and no friction rub.   Murmur heard.  Pulmonary/Chest: Effort normal and breath sounds normal. No respiratory distress. He has no wheezes. He has no rhonchi. He has no rales.   Abdominal: Soft. Normal appearance. He exhibits no distension. There is no tenderness.   Musculoskeletal: Normal range of motion. He exhibits no edema.   Neurological: He is alert and oriented to person, place, and time.   Skin: Skin is warm, dry and intact. No rash noted. He is not diaphoretic. No erythema. No pallor.   Psychiatric: He has a normal mood and affect. His speech is normal and behavior is normal. His mood appears not anxious. His affect is not angry. He does not exhibit a depressed mood.   Vitals reviewed.      Lab Review:   Echo  Interpretation Summary   04.12.2018  · Left atrial cavity size is moderately dilated.  · There is moderate prolapse of the " posterior leaflet present.  · Severe mitral valve regurgitation is present  · Mild tricuspid valve regurgitation is present.           Assessment:          Diagnosis Plan   1. Essential hypertension     2. Mitral valve prolapse     3. Non-rheumatic mitral regurgitation     4. Patient underweight            Plan:       - HTN: well controlled, off ace inhibitor due to need for apha blocker and alphad reductase inhibitors for prostate health    - MVP/MR: severe per echo, without symptoms. He does not have a single symptom and has not. He continues to be able to do his daily activities without complaint. Monitor symptoms.    - Underweight: BMI 15.     Follow up in ~ 3 months for routine follow up of symptoms. Sooner if needed.

## 2019-02-19 NOTE — PATIENT INSTRUCTIONS

## 2019-05-21 ENCOUNTER — OFFICE VISIT (OUTPATIENT)
Dept: CARDIOLOGY | Facility: CLINIC | Age: 84
End: 2019-05-21

## 2019-05-21 VITALS
BODY MASS INDEX: 15.96 KG/M2 | OXYGEN SATURATION: 98 % | SYSTOLIC BLOOD PRESSURE: 115 MMHG | WEIGHT: 114 LBS | RESPIRATION RATE: 18 BRPM | HEART RATE: 73 BPM | DIASTOLIC BLOOD PRESSURE: 70 MMHG | HEIGHT: 71 IN

## 2019-05-21 DIAGNOSIS — I10 ESSENTIAL HYPERTENSION: ICD-10-CM

## 2019-05-21 DIAGNOSIS — I34.1 MITRAL VALVE PROLAPSE: ICD-10-CM

## 2019-05-21 DIAGNOSIS — I34.0 NON-RHEUMATIC MITRAL REGURGITATION: Primary | ICD-10-CM

## 2019-05-21 DIAGNOSIS — R63.6 PATIENT UNDERWEIGHT: ICD-10-CM

## 2019-05-21 PROCEDURE — 93000 ELECTROCARDIOGRAM COMPLETE: CPT | Performed by: NURSE PRACTITIONER

## 2019-05-21 PROCEDURE — 99213 OFFICE O/P EST LOW 20 MIN: CPT | Performed by: NURSE PRACTITIONER

## 2019-05-21 NOTE — PROGRESS NOTES
Subjective:     Encounter Date:05/21/2019      Patient ID: Roman Kenney is a 84 y.o. male with a history of HTN, MVP, and severe MR per echo.  He presents for routine follow up today.    Chief Complaint: Follow up valve disease  Mitral Valve Prolapse   This is a chronic problem. The current episode started more than 1 year ago. Associated symptoms include anorexia. Pertinent negatives include no abdominal pain, chest pain, chills, congestion, coughing, diaphoresis, fatigue, fever, nausea, vomiting or weakness.   Hypertension   This is a chronic problem. The current episode started more than 1 year ago. The problem is controlled. Pertinent negatives include no anxiety, chest pain, malaise/fatigue, orthopnea, palpitations, peripheral edema, PND or shortness of breath. Risk factors for coronary artery disease include male gender. Past treatments include ACE inhibitors. Current antihypertension treatment includes alpha 1 blockers. There are no compliance problems.  There is no history of angina or CAD/MI.     Mr. Kenney presents today for follow up. He has no complaints. He has known severe valvular disease and continues to state that he has had no side effects or symptoms related to this.  He denies near syncope, syncope, dizziness, lightheadedness.  He denies shortness of breath, dyspnea with exertion, or fatigue.  His stamina is unchanged and he feels well.     The following portions of the patient's history were reviewed and updated as appropriate: allergies, current medications, past family history, past medical history, past social history and past surgical history.     No Known Allergies      Current Outpatient Medications:   •  finasteride (PROSCAR) 5 MG tablet, Take 1 tablet by mouth Daily., Disp: 90 tablet, Rfl: 3  •  tamsulosin (FLOMAX) 0.4 MG capsule 24 hr capsule, Take 1 capsule by mouth Daily., Disp: 90 capsule, Rfl: 3    Past Medical History:   Diagnosis Date   • BPH (benign prostatic  hypertrophy)    • Epididymitis    • Hypertension    • Hypertension, essential    • Mitral valve regurgitation    • Undiagnosed cardiac murmurs    • Weight loss      Family History   Problem Relation Age of Onset   • No Known Problems Mother    • Cancer Father    • Cancer Sister    • No Known Problems Brother    • No Known Problems Sister    • No Known Problems Sister    • No Known Problems Sister      Social History     Socioeconomic History   • Marital status:      Spouse name: Not on file   • Number of children: Not on file   • Years of education: Not on file   • Highest education level: Not on file   Tobacco Use   • Smoking status: Never Smoker   • Smokeless tobacco: Never Used   Substance and Sexual Activity   • Alcohol use: No   • Drug use: No   • Sexual activity: Defer     Past Surgical History:   Procedure Laterality Date   • COLONOSCOPY      Colon Polyp Removal    • FINGER SURGERY Right     tumor removed from a finger     Review of Systems   Constitution: Negative for chills, diaphoresis, fatigue, fever, weakness, malaise/fatigue, weight gain and weight loss.   HENT: Negative for congestion.    Cardiovascular: Negative for chest pain, dyspnea on exertion, leg swelling, near-syncope, orthopnea, palpitations, paroxysmal nocturnal dyspnea and syncope.   Respiratory: Negative for cough, shortness of breath and wheezing.    Hematologic/Lymphatic: Negative for bleeding problem.   Skin: Negative for dry skin, flushing and itching.   Musculoskeletal: Negative for falls.   Gastrointestinal: Positive for anorexia. Negative for bloating, abdominal pain, nausea and vomiting.   Neurological: Negative for dizziness, light-headedness and loss of balance.   Psychiatric/Behavioral: Negative for altered mental status. The patient is not nervous/anxious.    All other systems reviewed and are negative.      ECG 12 Lead  Date/Time: 5/21/2019 10:32 AM  Performed by: Sharon Montes APRN  Authorized by: Sharon Montes,  "APRN   Comparison: compared with previous ECG from 10/19/2018  Similar to previous ECG  Rhythm: sinus rhythm  Rate: normal  BPM: 73  Conduction: conduction normal  ST Segments: ST segments normal  T Waves: T waves normal  QRS axis: normal            /70 (BP Location: Right arm, Patient Position: Sitting, Cuff Size: Adult)   Pulse 73   Resp 18   Ht 180.3 cm (71\")   Wt 51.7 kg (114 lb)   SpO2 98%   BMI 15.90 kg/m²        Objective:     Physical Exam   Constitutional: He is oriented to person, place, and time. Vital signs are normal. He appears well-developed. He appears cachectic. He is cooperative. No distress.   HENT:   Head: Normocephalic and atraumatic.   Mouth/Throat: No oropharyngeal exudate.   Eyes: Conjunctivae are normal. Right eye exhibits no discharge. Left eye exhibits no discharge.   Neck: Normal range of motion. Neck supple.   Cardiovascular: Normal rate and regular rhythm. Exam reveals no gallop and no friction rub.   Murmur heard.  Pulmonary/Chest: Effort normal and breath sounds normal. No respiratory distress. He has no wheezes. He has no rhonchi. He has no rales.   Abdominal: Soft. Normal appearance. He exhibits no distension. There is no tenderness.   Musculoskeletal: Normal range of motion. He exhibits no edema.   Neurological: He is alert and oriented to person, place, and time.   Skin: Skin is warm, dry and intact. No rash noted. He is not diaphoretic. No erythema. No pallor.   Psychiatric: He has a normal mood and affect. His speech is normal and behavior is normal. His mood appears not anxious. His affect is not angry. He does not exhibit a depressed mood.   Vitals reviewed.      Lab Review:   Echo  Interpretation Summary   04.12.2018  · Left atrial cavity size is moderately dilated.  · There is moderate prolapse of the posterior leaflet present.  · Severe mitral valve regurgitation is present  · Mild tricuspid valve regurgitation is present.         Assessment:          Diagnosis " Plan   1. Non-rheumatic mitral regurgitation     2. Mitral valve prolapse     3. Essential hypertension     4. Patient underweight            Plan:       - MVP/MR: severe per echo, without symptoms. He does not have a single symptom and has not. He continues to be able to do his daily activities without complaint. Monitor symptoms.    - HTN: well controlled, off ace inhibitor due to need for apha blocker and alpha reductase inhibitors for prostate health    - Underweight: BMI 15.     Follow up in ~ 3 months for routine follow up of symptoms. Sooner if needed.

## 2019-05-21 NOTE — PATIENT INSTRUCTIONS

## 2019-07-03 ENCOUNTER — TRANSCRIBE ORDERS (OUTPATIENT)
Dept: ADMINISTRATIVE | Facility: HOSPITAL | Age: 84
End: 2019-07-03

## 2019-07-03 ENCOUNTER — HOSPITAL ENCOUNTER (OUTPATIENT)
Dept: ULTRASOUND IMAGING | Facility: HOSPITAL | Age: 84
Discharge: HOME | End: 2019-07-03
Admitting: FAMILY MEDICINE

## 2019-07-03 ENCOUNTER — OFFICE VISIT (OUTPATIENT)
Dept: FAMILY MEDICINE CLINIC | Facility: CLINIC | Age: 84
End: 2019-07-03

## 2019-07-03 ENCOUNTER — HOSPITAL ENCOUNTER (OUTPATIENT)
Dept: CT IMAGING | Facility: HOSPITAL | Age: 84
Discharge: HOME | End: 2019-07-03

## 2019-07-03 VITALS
RESPIRATION RATE: 16 BRPM | HEIGHT: 71 IN | BODY MASS INDEX: 14.7 KG/M2 | SYSTOLIC BLOOD PRESSURE: 98 MMHG | WEIGHT: 105 LBS | OXYGEN SATURATION: 98 % | HEART RATE: 115 BPM | DIASTOLIC BLOOD PRESSURE: 62 MMHG

## 2019-07-03 DIAGNOSIS — N50.819 TESTICLE PAIN: ICD-10-CM

## 2019-07-03 DIAGNOSIS — R10.31 RIGHT LOWER QUADRANT ABDOMINAL PAIN: ICD-10-CM

## 2019-07-03 DIAGNOSIS — R10.31 RIGHT LOWER QUADRANT ABDOMINAL PAIN: Primary | ICD-10-CM

## 2019-07-03 PROCEDURE — 74176 CT ABD & PELVIS W/O CONTRAST: CPT

## 2019-07-03 PROCEDURE — 99214 OFFICE O/P EST MOD 30 MIN: CPT | Performed by: FAMILY MEDICINE

## 2019-07-03 PROCEDURE — 76870 US EXAM SCROTUM: CPT

## 2019-07-03 RX ORDER — CIPROFLOXACIN 500 MG/1
500 TABLET, FILM COATED ORAL 2 TIMES DAILY
Qty: 20 TABLET | Refills: 0 | Status: SHIPPED | OUTPATIENT
Start: 2019-07-03 | End: 2019-09-11 | Stop reason: HOSPADM

## 2019-07-03 NOTE — PROGRESS NOTES
"Subjective   Roman Kenney is a 84 y.o. male.     Chief Complaint   Patient presents with   • Groin Pain     right side groin pain            History of Present Illness     he notes right sided abd pain with swollen right testicle for several days...denies any fever or chills       Current Outpatient Medications:   •  ciprofloxacin (CIPRO) 500 MG tablet, Take 1 tablet by mouth 2 (Two) Times a Day., Disp: 20 tablet, Rfl: 0  •  finasteride (PROSCAR) 5 MG tablet, Take 1 tablet by mouth Daily., Disp: 90 tablet, Rfl: 3  •  tamsulosin (FLOMAX) 0.4 MG capsule 24 hr capsule, Take 1 capsule by mouth Daily., Disp: 90 capsule, Rfl: 3  No Known Allergies    Past Medical History:   Diagnosis Date   • BPH (benign prostatic hypertrophy)    • Epididymitis    • Hypertension    • Hypertension, essential    • Mitral valve regurgitation    • Undiagnosed cardiac murmurs    • Weight loss      Past Surgical History:   Procedure Laterality Date   • COLONOSCOPY      Colon Polyp Removal    • FINGER SURGERY Right     tumor removed from a finger       Review of Systems   Constitutional: Negative.    HENT: Negative.    Eyes: Negative.    Respiratory: Negative.    Cardiovascular: Negative.    Gastrointestinal: Positive for abdominal pain.   Endocrine: Negative.    Genitourinary: Negative.    Musculoskeletal: Negative.    Skin: Negative.    Allergic/Immunologic: Negative.    Neurological: Negative.    Hematological: Negative.    Psychiatric/Behavioral: Negative.        Objective  BP 98/62   Pulse 115   Resp 16   Ht 180.3 cm (71\")   Wt 47.6 kg (105 lb)   SpO2 98%   BMI 14.64 kg/m²   Physical Exam   Constitutional: He is oriented to person, place, and time. He appears well-developed and well-nourished.   HENT:   Head: Normocephalic and atraumatic.   Right Ear: External ear normal.   Left Ear: External ear normal.   Nose: Nose normal.   Mouth/Throat: Oropharynx is clear and moist.   Eyes: Conjunctivae and EOM are normal. Pupils are " equal, round, and reactive to light.   Neck: Normal range of motion. Neck supple.   Cardiovascular: Normal rate, regular rhythm and normal heart sounds.   Pulmonary/Chest: Effort normal and breath sounds normal.   Abdominal: Soft. There is tenderness.   Musculoskeletal: Normal range of motion.   Neurological: He is alert and oriented to person, place, and time.   Skin: Skin is warm. Capillary refill takes less than 2 seconds.   Psychiatric: He has a normal mood and affect. His behavior is normal. Judgment and thought content normal.   Nursing note and vitals reviewed.      Assessment/Plan   Roman was seen today for groin pain.    Diagnoses and all orders for this visit:    Right lower quadrant abdominal pain  -     CT Abdomen Pelvis Without Contrast    Testicle pain  -     US scrotum and testicles    Other orders  -     ciprofloxacin (CIPRO) 500 MG tablet; Take 1 tablet by mouth 2 (Two) Times a Day.                 Orders Placed This Encounter   Procedures   • CT Abdomen Pelvis Without Contrast     Order Specific Question:   Will Oral Contrast be needed for this procedure?     Answer:   No   • US scrotum and testicles     Order Specific Question:   Reason for Exam:     Answer:   right testicle pain /swelling       Follow up: 1 week(s)

## 2019-07-09 ENCOUNTER — APPOINTMENT (OUTPATIENT)
Dept: ULTRASOUND IMAGING | Facility: HOSPITAL | Age: 84
End: 2019-07-09

## 2019-07-10 ENCOUNTER — OFFICE VISIT (OUTPATIENT)
Dept: FAMILY MEDICINE CLINIC | Facility: CLINIC | Age: 84
End: 2019-07-10

## 2019-07-10 VITALS
BODY MASS INDEX: 15.68 KG/M2 | HEART RATE: 82 BPM | WEIGHT: 112 LBS | HEIGHT: 71 IN | RESPIRATION RATE: 16 BRPM | SYSTOLIC BLOOD PRESSURE: 108 MMHG | DIASTOLIC BLOOD PRESSURE: 68 MMHG | OXYGEN SATURATION: 99 %

## 2019-07-10 DIAGNOSIS — N45.1 EPIDIDYMITIS: Primary | ICD-10-CM

## 2019-07-10 PROCEDURE — 99213 OFFICE O/P EST LOW 20 MIN: CPT | Performed by: FAMILY MEDICINE

## 2019-07-10 PROCEDURE — G0439 PPPS, SUBSEQ VISIT: HCPCS | Performed by: FAMILY MEDICINE

## 2019-07-10 RX ORDER — CIPROFLOXACIN 500 MG/1
500 TABLET, FILM COATED ORAL 2 TIMES DAILY
Qty: 10 TABLET | Refills: 0 | Status: SHIPPED | OUTPATIENT
Start: 2019-07-10 | End: 2019-09-11 | Stop reason: HOSPADM

## 2019-07-10 NOTE — PATIENT INSTRUCTIONS
Advance Directive  Advance directives are legal documents that let you make choices ahead of time about your health care and medical treatment in case you become unable to communicate for yourself. Advance directives are a way for you to communicate your wishes to family, friends, and health care providers. This can help convey your decisions about end-of-life care if you become unable to communicate.  Discussing and writing advance directives should happen over time rather than all at once. Advance directives can be changed depending on your situation and what you want, even after you have signed the advance directives.  If you do not have an advance directive, some states assign family decision makers to act on your behalf based on how closely you are related to them. Each state has its own laws regarding advance directives. You may want to check with your health care provider, , or state representative about the laws in your state. There are different types of advance directives, such as:  · Medical power of .  · Living will.  · Do not resuscitate (DNR) or do not attempt resuscitation (DNAR) order.    Health care proxy and medical power of   A health care proxy, also called a health care agent, is a person who is appointed to make medical decisions for you in cases in which you are unable to make the decisions yourself. Generally, people choose someone they know well and trust to represent their preferences. Make sure to ask this person for an agreement to act as your proxy. A proxy may have to exercise judgment in the event of a medical decision for which your wishes are not known.  A medical power of  is a legal document that names your health care proxy. Depending on the laws in your state, after the document is written, it may also need to be:  · Signed.  · Notarized.  · Dated.  · Copied.  · Witnessed.  · Incorporated into your medical record.    You may also want to appoint  someone to manage your financial affairs in a situation in which you are unable to do so. This is called a durable power of  for finances. It is a separate legal document from the durable power of  for health care. You may choose the same person or someone different from your health care proxy to act as your agent in financial matters.  If you do not appoint a proxy, or if there is a concern that the proxy is not acting in your best interests, a court-appointed guardian may be designated to act on your behalf.  Living will  A living will is a set of instructions documenting your wishes about medical care when you cannot express them yourself. Health care providers should keep a copy of your living will in your medical record. You may want to give a copy to family members or friends. To alert caregivers in case of an emergency, you can place a card in your wallet to let them know that you have a living will and where they can find it. A living will is used if you become:  · Terminally ill.  · Incapacitated.  · Unable to communicate or make decisions.    Items to consider in your living will include:  · The use or non-use of life-sustaining equipment, such as dialysis machines and breathing machines (ventilators).  · A DNR or DNAR order, which is the instruction not to use cardiopulmonary resuscitation (CPR) if breathing or heartbeat stops.  · The use or non-use of tube feeding.  · Withholding of food and fluids.  · Comfort (palliative) care when the goal becomes comfort rather than a cure.  · Organ and tissue donation.    A living will does not give instructions for distributing your money and property if you should pass away. It is recommended that you seek the advice of a  when writing a will. Decisions about taxes, beneficiaries, and asset distribution will be legally binding. This process can relieve your family and friends of any concerns surrounding disputes or questions that may come up  about the distribution of your assets.  DNR or DNAR  A DNR or DNAR order is a request not to have CPR in the event that your heart stops beating or you stop breathing. If a DNR or DNAR order has not been made and shared, a health care provider will try to help any patient whose heart has stopped or who has stopped breathing. If you plan to have surgery, talk with your health care provider about how your DNR or DNAR order will be followed if problems occur.  Summary  · Advance directives are the legal documents that allow you to make choices ahead of time about your health care and medical treatment in case you become unable to communicate for yourself.  · The process of discussing and writing advance directives should happen over time. You can change the advance directives, even after you have signed them.  · Advance directives include DNR or DNAR orders, living simon, and designating an agent as your medical power of .  This information is not intended to replace advice given to you by your health care provider. Make sure you discuss any questions you have with your health care provider.  Document Released: 03/26/2009 Document Revised: 11/06/2017 Document Reviewed: 11/06/2017  [employer] Interactive Patient Education © 2019 [employer]

## 2019-07-10 NOTE — PROGRESS NOTES
"Subjective   Rmoan Kenney is a 84 y.o. male.     Chief Complaint   Patient presents with   • Follow-up     1 wk f/u  RLQ abd pain       History of Present Illness     his abd pain rsolved and his scrotuam discomfort is much imporved      Current Outpatient Medications:   •  ciprofloxacin (CIPRO) 500 MG tablet, Take 1 tablet by mouth 2 (Two) Times a Day., Disp: 20 tablet, Rfl: 0  •  ciprofloxacin (CIPRO) 500 MG tablet, Take 1 tablet by mouth 2 (Two) Times a Day., Disp: 10 tablet, Rfl: 0  •  finasteride (PROSCAR) 5 MG tablet, Take 1 tablet by mouth Daily., Disp: 90 tablet, Rfl: 3  •  tamsulosin (FLOMAX) 0.4 MG capsule 24 hr capsule, Take 1 capsule by mouth Daily., Disp: 90 capsule, Rfl: 3  No Known Allergies    Past Medical History:   Diagnosis Date   • BPH (benign prostatic hypertrophy)    • Epididymitis    • Hypertension    • Hypertension, essential    • Mitral valve regurgitation    • Undiagnosed cardiac murmurs    • Weight loss      Past Surgical History:   Procedure Laterality Date   • COLONOSCOPY      Colon Polyp Removal    • FINGER SURGERY Right     tumor removed from a finger       Review of Systems   Constitutional: Negative.    HENT: Negative.    Eyes: Negative.    Respiratory: Negative.    Cardiovascular: Negative.    Gastrointestinal: Negative.    Endocrine: Negative.    Genitourinary: Positive for genital sores and testicular pain.   Musculoskeletal: Negative.    Allergic/Immunologic: Negative.    Neurological: Negative.    Hematological: Negative.    Psychiatric/Behavioral: Negative.        Objective  /68   Pulse 82   Resp 16   Ht 180.3 cm (71\")   Wt 50.8 kg (112 lb)   SpO2 99%   BMI 15.62 kg/m²   Physical Exam   Constitutional: He is oriented to person, place, and time. He appears well-developed and well-nourished.   HENT:   Head: Normocephalic.   Eyes: EOM are normal. Pupils are equal, round, and reactive to light.   Neck: Normal range of motion. Neck supple.   Cardiovascular: " Normal rate.   Pulmonary/Chest: Effort normal and breath sounds normal.   Abdominal: Soft.   Musculoskeletal: Normal range of motion.   Neurological: He is alert and oriented to person, place, and time.   Skin: Skin is warm. Capillary refill takes less than 2 seconds.   Psychiatric: He has a normal mood and affect. His behavior is normal. Judgment and thought content normal.   Vitals reviewed.      Assessment/Plan   Roman was seen today for follow-up.    Diagnoses and all orders for this visit:    Epididymitis    Other orders  -     ciprofloxacin (CIPRO) 500 MG tablet; Take 1 tablet by mouth 2 (Two) Times a Day.                 No orders of the defined types were placed in this encounter.      Follow up: 5 month(s)

## 2019-07-10 NOTE — PROGRESS NOTES
QUICK REFERENCE INFORMATION:  The ABCs of the Annual Wellness Visit    Subsequent Medicare Wellness Visit     HEALTH RISK ASSESSMENT    : 1935    Recent Hospitalizations:  No hospitalization(s) within the last year..  ccc      Current Medical Providers:  Patient Care Team:  Yariel Hancock MD as PCP - General (Family Medicine)  Yariel Hancock MD as PCP - Family Medicine  Yariel Hancock MD as PCP - Claims [facility]  Joseph Cha MD as Consulting Physician (Urology)        Smoking Status:  Social History     Tobacco Use   Smoking Status Never Smoker   Smokeless Tobacco Never Used       Alcohol Consumption:  Social History     Substance and Sexual Activity   Alcohol Use No       Depression Screen:   PHQ-2/PHQ-9 Depression Screening 7/10/2019   Little interest or pleasure in doing things 0   Feeling down, depressed, or hopeless 0   Trouble falling or staying asleep, or sleeping too much 0   Feeling tired or having little energy 0   Poor appetite or overeating 0   Feeling bad about yourself - or that you are a failure or have let yourself or your family down 0   Trouble concentrating on things, such as reading the newspaper or watching television 0   Moving or speaking so slowly that other people could have noticed. Or the opposite - being so fidgety or restless that you have been moving around a lot more than usual 0   Thoughts that you would be better off dead, or of hurting yourself in some way 0   Total Score 0   If you checked off any problems, how difficult have these problems made it for you to do your work, take care of things at home, or get along with other people? Not difficult at all       Health Habits and Functional and Cognitive Screening:  Functional & Cognitive Status 7/10/2019   Do you have difficulty preparing food and eating? No   Do you have difficulty bathing yourself, getting dressed or grooming yourself? No   Do you have difficulty using the toilet? No   Do  you have difficulty moving around from place to place? No   Do you have trouble with steps or getting out of a bed or a chair? No   In the past year have you fallen or experienced a near fall? No   Current Diet Well Balanced Diet   Dental Exam Not up to date   Eye Exam Not up to date   Exercise (times per week) 6 times per week   Current Exercise Activities Include Walking   Do you need help using the phone?  No   Are you deaf or do you have serious difficulty hearing?  No   Do you need help with transportation? No   Do you need help shopping? No   Do you need help preparing meals?  No   Do you need help with housework?  No   Do you need help with laundry? No   Do you need help taking your medications? No   Do you need help managing money? No   Do you ever drive or ride in a car without wearing a seat belt? No   Have you felt unusual stress, anger or loneliness in the last month? No   Who do you live with? Spouse   If you need help, do you have trouble finding someone available to you? No   Have you been bothered in the last four weeks by sexual problems? No   Do you have difficulty concentrating, remembering or making decisions? No           Does the patient have evidence of cognitive impairment? No    Asiprin use counseling: Does not need ASA (and currently is not on it)      Recent Lab Results:    Lab Results   Component Value Date    GLU 90 10/16/2018        Lab Results   Component Value Date    TRIG 74 09/22/2016    HDL 69 09/22/2016    VLDL 14.8 09/22/2016           Age-appropriate Screening Schedule:  Refer to the list below for future screening recommendations based on patient's age, sex and/or medical conditions. Orders for these recommended tests are listed in the plan section. The patient has been provided with a written plan.    Health Maintenance   Topic Date Due   • TDAP/TD VACCINES (1 - Tdap) 03/05/1954   • ZOSTER VACCINE (1 of 2) 03/05/1985   • PNEUMOCOCCAL VACCINES (65+ LOW/MEDIUM RISK) (1 of 2 -  "PCV13) 03/05/2000   • INFLUENZA VACCINE  08/01/2019        Subjective   History of Present Illness    Roman Kenney is a 84 y.o. male who presents for an Annual Wellness Visit.    The following portions of the patient's history were reviewed and updated as appropriate: allergies, current medications, past family history, past medical history, past social history, past surgical history and problem list.    Outpatient Medications Prior to Visit   Medication Sig Dispense Refill   • ciprofloxacin (CIPRO) 500 MG tablet Take 1 tablet by mouth 2 (Two) Times a Day. 20 tablet 0   • finasteride (PROSCAR) 5 MG tablet Take 1 tablet by mouth Daily. 90 tablet 3   • tamsulosin (FLOMAX) 0.4 MG capsule 24 hr capsule Take 1 capsule by mouth Daily. 90 capsule 3     No facility-administered medications prior to visit.        Patient Active Problem List   Diagnosis   • Essential hypertension   • Benign non-nodular prostatic hyperplasia without lower urinary tract symptoms   • Mitral valve regurgitation   • Mitral valve prolapse   • Weight loss   • Patient underweight   • Testicle pain   • Right lower quadrant abdominal pain   • Epididymitis       Advance Care Planning:  Patient does not have an advance directive - information provided to the patient today    Identification of Risk Factors:  Risk factors include: Advance Directive Discussion.    Review of Systems    Compared to one year ago, the patient feels his physical health is the same.  Compared to one year ago, the patient feels his mental health is the same.    Objective     Physical Exam    Vitals:    07/10/19 1351   BP: 108/68   Pulse: 82   Resp: 16   SpO2: 99%   Weight: 50.8 kg (112 lb)   Height: 180.3 cm (71\")       Patient's Body mass index is 15.62 kg/m². BMI is below normal parameters. Recommendations include: treating the underlying disease process.      Assessment/Plan   Patient Self-Management and Personalized Health Advice  The patient has been provided with " information about: designing advance directives and preventive services including:   · Advance directive.    Visit Diagnoses:    ICD-10-CM ICD-9-CM   1. Epididymitis N45.1 604.90       No orders of the defined types were placed in this encounter.      Outpatient Encounter Medications as of 7/10/2019   Medication Sig Dispense Refill   • ciprofloxacin (CIPRO) 500 MG tablet Take 1 tablet by mouth 2 (Two) Times a Day. 20 tablet 0   • ciprofloxacin (CIPRO) 500 MG tablet Take 1 tablet by mouth 2 (Two) Times a Day. 10 tablet 0   • finasteride (PROSCAR) 5 MG tablet Take 1 tablet by mouth Daily. 90 tablet 3   • tamsulosin (FLOMAX) 0.4 MG capsule 24 hr capsule Take 1 capsule by mouth Daily. 90 capsule 3     No facility-administered encounter medications on file as of 7/10/2019.        Reviewed use of high risk medication in the elderly: yes  Reviewed for potential of harmful drug interactions in the elderly: yes    Follow Up:  No Follow-up on file.     An After Visit Summary and PPPS with all of these plans were given to the patient.

## 2019-09-01 ENCOUNTER — HOSPITAL ENCOUNTER (EMERGENCY)
Dept: HOSPITAL 58 - ED | Age: 84
Discharge: HOME | End: 2019-09-01
Payer: COMMERCIAL

## 2019-09-01 VITALS — SYSTOLIC BLOOD PRESSURE: 114 MMHG | DIASTOLIC BLOOD PRESSURE: 75 MMHG | TEMPERATURE: 98.6 F

## 2019-09-01 VITALS — BODY MASS INDEX: 14.9 KG/M2

## 2019-09-01 DIAGNOSIS — B02.9: Primary | ICD-10-CM

## 2019-09-01 PROCEDURE — 99282 EMERGENCY DEPT VISIT SF MDM: CPT

## 2019-09-01 NOTE — ED.PDOC
General


ED Provider: 


Dr. BLAS MENEZES-ER





Chief Complaint: Rash


Stated Complaint: bruno had this rash for 2 days


Time Seen by Physician: 15:33


Mode of Arrival: Walk-In


Information Source: Patient


Exam Limitations: No limitations


Primary Care Provider: 


BLAS MENEZES





Nursing and Triage Documentation Reviewed and Agree: Yes


Does patient meet sepsis criteria?: No


System Inflammatory Response Syndrome: Not Applicable


Sepsis Protocol: 


For patient's 13 years and over:





Temp is 96.8 and below  and greater


Pulse >90 BPM


Resp >20/minute


Acutely Altered Mental Status





Are patient's symptoms suggestive of a new infection, such as:


   -Pneumonia


   -Skin, Soft Tissue


   -Endocarditis


   -UTI


   -Bone, Joint Infection


   -Implantable Device


   -Acute Abdominal Infection


   -Wound Infection


   -Meningitis


   -Blood Stream Catheter Infection


   -Unknown








Skin Complaint Exam





- Skin Rash/Itching Complaint/Exam


Onset/Duration: 2 days


Symptoms Are: Still present


Initial Severity: Mild


Current Severity: Moderate


Location: right flank


Potential Exposures: Reports: Unknown


Aggravating: Reports: None


Alleviating: Reports: None


Associated Signs and Symptoms: Denies: Difficulty breathing, Fever, Chills


Skin Findings: Present: Vesicles


Differential Diagnoses: Varicella Zoster





Review of Systems





- Review Of Systems


Constitutional: Reports: No symptoms


Eyes: Reports: No symptoms


Ears, Nose, Mouth, Throat: Reports: No symptoms


Respiratory: Reports: No symptoms


Cardiac: Reports: No symptoms


GI: Reports: No symptoms


: Reports: No symptoms


Musculoskeletal: Reports: No symptoms


Skin: Reports: Rash


Neurological: Reports: No symptoms


Endocrine: Reports: No symptoms


Hematologic/Lymphatic: Reports: No symptoms


All Other Systems: Reviewed and Negative





Past Medical History





- Past Medical History


Previously Healthy: No


Endocrine: Reports: Unknown


Cardiovascular: Reports: Unknown


Respiratory: Reports: Unknown


Hematological: Reports: Unknown


Gastrointestinal: Reports: Unknown


Genitourinary: Reports: Unknown


Neuro/Psych: Reports: Unknown


Musculoskeletal: Reports: Unknown


Cancer: Reports: Unknown





- Surgical History


General Surgical History: Reports: Unknown





- Family History


Family History: Reports: Unknown





- Social History


Smoking Status: Never smoker


Hx Substance Use: No


Alcohol Screening: None





Physical Exam





- Physical Exam


Appearance: Well-appearing, No pain distress, Well-nourished


Eyes: MODESTO, EOMI, Conjunctiva clear


ENT: Ears normal, Nose normal, Oropharynx normal


Neck: Supple


Respiratory: Airway patent


Cardiovascular: RRR


GI/: Soft, Nontender, No masses, Bowel sounds normal, No Organomegaly


Musculoskeletal: Normal strength, ROM intact, No edema, No calf tenderness


Skin: Warm, Dry, Normal color (noted vesicular rash over right flank)


Neurological: Sensation intact, Motor intact, Reflexes intact, Cranial nerves 

intact, Alert, Oriented


Psychiatric: Affect appropriate, Mood appropriate





Critical Care Note





- Critical Care Note


Total Time (mins): 0





Course





- Course


Vital Signs: 





 











  Temp Pulse Resp BP Pulse Ox


 


 09/01/19 15:24  98.6 F  86  18  114/75  97














Departure





- Departure


Time of Disposition: 15:35


Disposition: HOME SELF-CARE


Discharge Problem: 


Herpes zoster


Qualifiers:


 Herpes zoster complications: without complications Qualified Code(s): B02.9 - 

Zoster without complications





Instructions:  Shingles (ED)


Condition: Good


Pt referred to PMD for follow-up: Yes


IPMP verified?: No


Additional Instructions: 


f/u with pcp prn


Allergies/Adverse Reactions: 


Allergies





No Known Allergies Allergy (Verified 09/01/19 15:27)


 








Home Medications: 


Ambulatory Orders





Tamsulosin HCl [Flomax] 0.4 mg PO DAILY 01/27/15 


Acyclovir [Zovirax] 800 mg PO Q4HR #30 capsule 09/01/19 


Finasteride [Proscar] 5 mg PO DAILY 09/01/19 


Hydrocodone Bit/Acetaminophen [Norco 5-325] 1 each PO Q4HR PRN #14 tablet 09/01/ 19 








Transfer Form Completed: No


Disposition Discussed With: Patient, Family

## 2019-09-06 ENCOUNTER — OFFICE VISIT (OUTPATIENT)
Dept: FAMILY MEDICINE CLINIC | Facility: CLINIC | Age: 84
End: 2019-09-06

## 2019-09-06 VITALS
HEART RATE: 74 BPM | OXYGEN SATURATION: 97 % | DIASTOLIC BLOOD PRESSURE: 70 MMHG | HEIGHT: 71 IN | SYSTOLIC BLOOD PRESSURE: 102 MMHG | RESPIRATION RATE: 18 BRPM | BODY MASS INDEX: 15.4 KG/M2 | WEIGHT: 110 LBS

## 2019-09-06 DIAGNOSIS — B02.9 HERPES ZOSTER WITHOUT COMPLICATION: Primary | ICD-10-CM

## 2019-09-06 PROCEDURE — 99213 OFFICE O/P EST LOW 20 MIN: CPT | Performed by: FAMILY MEDICINE

## 2019-09-06 RX ORDER — ACYCLOVIR 800 MG/1
TABLET ORAL
Refills: 0 | COMMUNITY
Start: 2019-09-01 | End: 2020-01-23

## 2019-09-06 RX ORDER — HYDROCODONE BITARTRATE AND ACETAMINOPHEN 5; 325 MG/1; MG/1
TABLET ORAL
Refills: 0 | COMMUNITY
Start: 2019-09-01 | End: 2020-01-23

## 2019-09-06 NOTE — PROGRESS NOTES
"Subjective   Roman Kenney is a 84 y.o. male.     Chief Complaint   Patient presents with   • Follow-up     ER F/U Shingles   • Herpes Zoster       History of Present Illness     noted healing vesicular lesions over right flnk since dx with herpes zoster--denies any pain only occ itching      Current Outpatient Medications:   •  acyclovir (ZOVIRAX) 800 MG tablet, TK 1 T PO  EVERY 4 HOURS, Disp: , Rfl: 0  •  ciprofloxacin (CIPRO) 500 MG tablet, Take 1 tablet by mouth 2 (Two) Times a Day., Disp: 20 tablet, Rfl: 0  •  ciprofloxacin (CIPRO) 500 MG tablet, Take 1 tablet by mouth 2 (Two) Times a Day., Disp: 10 tablet, Rfl: 0  •  finasteride (PROSCAR) 5 MG tablet, Take 1 tablet by mouth Daily., Disp: 90 tablet, Rfl: 3  •  HYDROcodone-acetaminophen (NORCO) 5-325 MG per tablet, TK 1 T PO  EVERY 4 HOURS AS NEEDED FOR MODERATE PAIN, Disp: , Rfl: 0  •  tamsulosin (FLOMAX) 0.4 MG capsule 24 hr capsule, Take 1 capsule by mouth Daily., Disp: 90 capsule, Rfl: 3  No Known Allergies    Past Medical History:   Diagnosis Date   • BPH (benign prostatic hypertrophy)    • Epididymitis    • Hypertension    • Hypertension, essential    • Mitral valve regurgitation    • Undiagnosed cardiac murmurs    • Weight loss      Past Surgical History:   Procedure Laterality Date   • COLONOSCOPY      Colon Polyp Removal    • FINGER SURGERY Right     tumor removed from a finger       Review of Systems   Constitutional: Negative.    HENT: Negative.    Eyes: Negative.    Respiratory: Negative.    Cardiovascular: Negative.    Gastrointestinal: Negative.    Endocrine: Negative.    Genitourinary: Negative.    Musculoskeletal: Negative.    Skin: Positive for rash.   Allergic/Immunologic: Negative.    Neurological: Negative.    Hematological: Negative.    Psychiatric/Behavioral: Negative.        Objective  /70 (BP Location: Left arm, Patient Position: Sitting)   Pulse 74   Resp 18   Ht 180.3 cm (71\")   Wt 49.9 kg (110 lb)   SpO2 97%   BMI " 15.34 kg/m²   Physical Exam   Constitutional: He is oriented to person, place, and time. He appears well-developed and well-nourished.   HENT:   Head: Normocephalic and atraumatic.   Right Ear: External ear normal.   Left Ear: External ear normal.   Nose: Nose normal.   Mouth/Throat: Oropharynx is clear and moist.   Eyes: Conjunctivae and EOM are normal. Pupils are equal, round, and reactive to light.   Neck: Normal range of motion. Neck supple.   Cardiovascular: Normal rate, regular rhythm, normal heart sounds and intact distal pulses.   Pulmonary/Chest: Effort normal and breath sounds normal.   Abdominal: Soft. Bowel sounds are normal.   Musculoskeletal: Normal range of motion.   Neurological: He is alert and oriented to person, place, and time.   Skin: Capillary refill takes less than 2 seconds. Rash noted.   Psychiatric: He has a normal mood and affect. His behavior is normal. Judgment and thought content normal.   Nursing note and vitals reviewed.  healing left flank vesicular rash --    Assessment/Plan   Roman was seen today for follow-up and herpes zoster.    Diagnoses and all orders for this visit:    Herpes zoster without complication    finish out zovirax  Keep me infomed  Patient's Body mass index is 15.34 kg/m². BMI is below normal parameters. Recommendations include: treating the underlying disease process.           No orders of the defined types were placed in this encounter.    Current outpatient and discharge medications have been reconciled for the patient.  Reviewed by: Yariel Hancock MD  Follow up: prn

## 2019-09-10 ENCOUNTER — OFFICE VISIT (OUTPATIENT)
Dept: FAMILY MEDICINE CLINIC | Facility: CLINIC | Age: 84
End: 2019-09-10

## 2019-09-10 ENCOUNTER — HOSPITAL ENCOUNTER (OUTPATIENT)
Facility: HOSPITAL | Age: 84
Setting detail: OBSERVATION
Discharge: HOME | End: 2019-09-11
Attending: FAMILY MEDICINE | Admitting: FAMILY MEDICINE

## 2019-09-10 VITALS
SYSTOLIC BLOOD PRESSURE: 106 MMHG | HEIGHT: 71 IN | WEIGHT: 110 LBS | RESPIRATION RATE: 16 BRPM | OXYGEN SATURATION: 97 % | HEART RATE: 76 BPM | BODY MASS INDEX: 15.4 KG/M2 | DIASTOLIC BLOOD PRESSURE: 76 MMHG

## 2019-09-10 DIAGNOSIS — R33.9 URINARY RETENTION: Primary | ICD-10-CM

## 2019-09-10 LAB
ALBUMIN SERPL-MCNC: 3.9 G/DL (ref 3.5–5.2)
ALBUMIN/GLOB SERPL: 1.4 G/DL
ALP SERPL-CCNC: 76 U/L (ref 39–117)
ALT SERPL W P-5'-P-CCNC: 15 U/L (ref 1–41)
ANION GAP SERPL CALCULATED.3IONS-SCNC: 8 MMOL/L (ref 5–15)
AST SERPL-CCNC: 19 U/L (ref 1–40)
BACTERIA UR QL AUTO: ABNORMAL /HPF
BASOPHILS # BLD AUTO: 0.01 10*3/MM3 (ref 0–0.2)
BASOPHILS NFR BLD AUTO: 0.2 % (ref 0–1.5)
BILIRUB SERPL-MCNC: 0.5 MG/DL (ref 0.2–1.2)
BILIRUB UR QL STRIP: NEGATIVE
BUN BLD-MCNC: 27 MG/DL (ref 8–23)
BUN/CREAT SERPL: 23.3 (ref 7–25)
CALCIUM SPEC-SCNC: 9.2 MG/DL (ref 8.6–10.5)
CHLORIDE SERPL-SCNC: 107 MMOL/L (ref 98–107)
CLARITY UR: ABNORMAL
CO2 SERPL-SCNC: 30 MMOL/L (ref 22–29)
COLOR UR: ABNORMAL
CREAT BLD-MCNC: 1.16 MG/DL (ref 0.76–1.27)
DEPRECATED RDW RBC AUTO: 46.4 FL (ref 37–54)
EOSINOPHIL # BLD AUTO: 0.04 10*3/MM3 (ref 0–0.4)
EOSINOPHIL NFR BLD AUTO: 0.6 % (ref 0.3–6.2)
ERYTHROCYTE [DISTWIDTH] IN BLOOD BY AUTOMATED COUNT: 14.4 % (ref 12.3–15.4)
GFR SERPL CREATININE-BSD FRML MDRD: 73 ML/MIN/1.73
GLOBULIN UR ELPH-MCNC: 2.8 GM/DL
GLUCOSE BLD-MCNC: 95 MG/DL (ref 65–99)
GLUCOSE UR STRIP-MCNC: NEGATIVE MG/DL
HCT VFR BLD AUTO: 29.7 % (ref 37.5–51)
HGB BLD-MCNC: 10 G/DL (ref 13–17.7)
HGB UR QL STRIP.AUTO: ABNORMAL
HYALINE CASTS UR QL AUTO: ABNORMAL /LPF
IMM GRANULOCYTES # BLD AUTO: 0.03 10*3/MM3 (ref 0–0.05)
IMM GRANULOCYTES NFR BLD AUTO: 0.5 % (ref 0–0.5)
KETONES UR QL STRIP: NEGATIVE
LEUKOCYTE ESTERASE UR QL STRIP.AUTO: ABNORMAL
LYMPHOCYTES # BLD AUTO: 1.12 10*3/MM3 (ref 0.7–3.1)
LYMPHOCYTES NFR BLD AUTO: 17.6 % (ref 19.6–45.3)
MCH RBC QN AUTO: 29.9 PG (ref 26.6–33)
MCHC RBC AUTO-ENTMCNC: 33.7 G/DL (ref 31.5–35.7)
MCV RBC AUTO: 88.9 FL (ref 79–97)
MONOCYTES # BLD AUTO: 0.67 10*3/MM3 (ref 0.1–0.9)
MONOCYTES NFR BLD AUTO: 10.6 % (ref 5–12)
NEUTROPHILS # BLD AUTO: 4.48 10*3/MM3 (ref 1.7–7)
NEUTROPHILS NFR BLD AUTO: 70.5 % (ref 42.7–76)
NITRITE UR QL STRIP: NEGATIVE
NRBC BLD AUTO-RTO: 0 /100 WBC (ref 0–0.2)
PH UR STRIP.AUTO: <=5 [PH] (ref 5–8)
PLATELET # BLD AUTO: 165 10*3/MM3 (ref 140–450)
PMV BLD AUTO: 10.4 FL (ref 6–12)
POTASSIUM BLD-SCNC: 4.5 MMOL/L (ref 3.5–5.2)
PROT SERPL-MCNC: 6.7 G/DL (ref 6–8.5)
PROT UR QL STRIP: ABNORMAL
RBC # BLD AUTO: 3.34 10*6/MM3 (ref 4.14–5.8)
RBC # UR: ABNORMAL /HPF
REF LAB TEST METHOD: ABNORMAL
SODIUM BLD-SCNC: 145 MMOL/L (ref 136–145)
SP GR UR STRIP: 1.01 (ref 1–1.03)
SQUAMOUS #/AREA URNS HPF: ABNORMAL /HPF
UROBILINOGEN UR QL STRIP: ABNORMAL
WBC NRBC COR # BLD: 6.35 10*3/MM3 (ref 3.4–10.8)
WBC UR QL AUTO: ABNORMAL /HPF

## 2019-09-10 PROCEDURE — 99219 PR INITIAL OBSERVATION CARE/DAY 50 MINUTES: CPT | Performed by: UROLOGY

## 2019-09-10 PROCEDURE — 81001 URINALYSIS AUTO W/SCOPE: CPT | Performed by: FAMILY MEDICINE

## 2019-09-10 PROCEDURE — G0378 HOSPITAL OBSERVATION PER HR: HCPCS

## 2019-09-10 PROCEDURE — 96360 HYDRATION IV INFUSION INIT: CPT

## 2019-09-10 PROCEDURE — 99218 PR INITIAL OBSERVATION CARE/DAY 30 MINUTES: CPT | Performed by: FAMILY MEDICINE

## 2019-09-10 PROCEDURE — 96361 HYDRATE IV INFUSION ADD-ON: CPT

## 2019-09-10 PROCEDURE — 99213 OFFICE O/P EST LOW 20 MIN: CPT | Performed by: FAMILY MEDICINE

## 2019-09-10 PROCEDURE — 94799 UNLISTED PULMONARY SVC/PX: CPT

## 2019-09-10 PROCEDURE — 80053 COMPREHEN METABOLIC PANEL: CPT | Performed by: FAMILY MEDICINE

## 2019-09-10 PROCEDURE — 85025 COMPLETE CBC W/AUTO DIFF WBC: CPT | Performed by: FAMILY MEDICINE

## 2019-09-10 PROCEDURE — 51702 INSERT TEMP BLADDER CATH: CPT

## 2019-09-10 PROCEDURE — 94760 N-INVAS EAR/PLS OXIMETRY 1: CPT

## 2019-09-10 RX ORDER — SODIUM CHLORIDE 0.9 % (FLUSH) 0.9 %
10 SYRINGE (ML) INJECTION AS NEEDED
Status: DISCONTINUED | OUTPATIENT
Start: 2019-09-10 | End: 2019-09-11 | Stop reason: HOSPADM

## 2019-09-10 RX ORDER — SODIUM CHLORIDE 0.9 % (FLUSH) 0.9 %
10 SYRINGE (ML) INJECTION EVERY 12 HOURS SCHEDULED
Status: DISCONTINUED | OUTPATIENT
Start: 2019-09-10 | End: 2019-09-11 | Stop reason: HOSPADM

## 2019-09-10 RX ORDER — SODIUM CHLORIDE 9 MG/ML
50 INJECTION, SOLUTION INTRAVENOUS CONTINUOUS
Status: DISCONTINUED | OUTPATIENT
Start: 2019-09-10 | End: 2019-09-10

## 2019-09-10 RX ORDER — FINASTERIDE 5 MG/1
5 TABLET, FILM COATED ORAL DAILY
Status: DISCONTINUED | OUTPATIENT
Start: 2019-09-10 | End: 2019-09-11 | Stop reason: HOSPADM

## 2019-09-10 RX ORDER — DEXTROSE, SODIUM CHLORIDE, AND POTASSIUM CHLORIDE 5; .9; .15 G/100ML; G/100ML; G/100ML
50 INJECTION INTRAVENOUS CONTINUOUS
Status: DISCONTINUED | OUTPATIENT
Start: 2019-09-10 | End: 2019-09-11 | Stop reason: HOSPADM

## 2019-09-10 RX ORDER — TAMSULOSIN HYDROCHLORIDE 0.4 MG/1
0.4 CAPSULE ORAL DAILY
Status: DISCONTINUED | OUTPATIENT
Start: 2019-09-10 | End: 2019-09-11 | Stop reason: HOSPADM

## 2019-09-10 RX ADMIN — POTASSIUM CHLORIDE, DEXTROSE MONOHYDRATE AND SODIUM CHLORIDE 50 ML/HR: 150; 5; 900 INJECTION, SOLUTION INTRAVENOUS at 22:48

## 2019-09-10 RX ADMIN — FINASTERIDE 5 MG: 5 TABLET, FILM COATED ORAL at 17:27

## 2019-09-10 RX ADMIN — SODIUM CHLORIDE 100 ML/HR: 9 INJECTION, SOLUTION INTRAVENOUS at 16:43

## 2019-09-10 RX ADMIN — TAMSULOSIN HYDROCHLORIDE 0.4 MG: 0.4 CAPSULE ORAL at 17:27

## 2019-09-10 NOTE — CONSULTS
Urology    Mr. Kenney is 84 y.o. male    REASON FOR CONSULT/CHIEF COMPLAINT: Urinary retention and gross hematuria.     HPI  84-year-old gentleman previously seen by Dr. Cha for known enlarged prostate on tamsulosin and finasteride last seen 2018.  He reports having an episode of acute urinary retention yesterday.  A CT scan abdomen and pelvis without contrast was performed at [place] showing a markedly distended bladder with a bladder diverticulum and a very large lobulated prostate.  He reports a Rivera catheter was placed yesterday but was removed this morning due to leakage around the catheter.  He has not passed any urine since the Rivera catheter was removed but reports being pain-free all day today without the sensation of need to void.  He reports having some gross hematuria yesterday after catheter was placed.  He denies flank pain, fever, or nausea vomiting.  He denies previous history of urinary retention, although his postvoid residuals have been elevated in the past.    The following portions of the patient's history were reviewed and updated as appropriate: allergies, current medications, past family history, past medical history, past social history, past surgical history and problem list.    Review of Systems   Genitourinary: Positive for difficulty urinating and hematuria.   All other systems reviewed and are negative.         Medications Prior to Admission   Medication Sig Dispense Refill Last Dose   • acyclovir (ZOVIRAX) 800 MG tablet TK 1 T PO  EVERY 4 HOURS  0 Taking   • ciprofloxacin (CIPRO) 500 MG tablet Take 1 tablet by mouth 2 (Two) Times a Day. 20 tablet 0 Taking   • ciprofloxacin (CIPRO) 500 MG tablet Take 1 tablet by mouth 2 (Two) Times a Day. 10 tablet 0 Taking   • finasteride (PROSCAR) 5 MG tablet Take 1 tablet by mouth Daily. 90 tablet 3 Taking   • HYDROcodone-acetaminophen (NORCO) 5-325 MG per tablet TK 1 T PO  EVERY 4 HOURS AS NEEDED FOR MODERATE PAIN  0 Taking   • tamsulosin  "(FLOMAX) 0.4 MG capsule 24 hr capsule Take 1 capsule by mouth Daily. 90 capsule 3 Taking         Current Facility-Administered Medications:   •  finasteride (PROSCAR) tablet 5 mg, 5 mg, Oral, Daily, Yariel Hancock MD, 5 mg at 09/10/19 1727  •  sodium chloride 0.9 % flush 10 mL, 10 mL, Intravenous, Q12H, Yariel Hancock MD  •  sodium chloride 0.9 % flush 10 mL, 10 mL, Intravenous, PRN, Yariel Hancock MD  •  sodium chloride 0.9 % infusion, 100 mL/hr, Intravenous, Continuous, Yariel Hancock MD, Last Rate: 100 mL/hr at 09/10/19 1643, 100 mL/hr at 09/10/19 1643  •  tamsulosin (FLOMAX) 24 hr capsule 0.4 mg, 0.4 mg, Oral, Daily, Yariel Hancock MD, 0.4 mg at 09/10/19 1727    Past Medical History:   Diagnosis Date   • BPH (benign prostatic hypertrophy)    • Epididymitis    • Hypertension    • Hypertension, essential    • Mitral valve regurgitation    • Undiagnosed cardiac murmurs    • Weight loss        Past Surgical History:   Procedure Laterality Date   • COLONOSCOPY      Colon Polyp Removal    • FINGER SURGERY Right     tumor removed from a finger       Social History     Socioeconomic History   • Marital status:      Spouse name: Not on file   • Number of children: Not on file   • Years of education: Not on file   • Highest education level: Not on file   Tobacco Use   • Smoking status: Never Smoker   • Smokeless tobacco: Never Used   Substance and Sexual Activity   • Alcohol use: No   • Drug use: No   • Sexual activity: Defer       Family History   Problem Relation Age of Onset   • No Known Problems Mother    • Cancer Father    • Cancer Sister    • No Known Problems Brother    • No Known Problems Sister    • No Known Problems Sister    • No Known Problems Sister        /66 (BP Location: Left arm, Patient Position: Lying)   Pulse 88   Temp 98.9 °F (37.2 °C) (Oral)   Resp 16   Ht 180.3 cm (71\")   Wt 47.6 kg (104 lb 15 oz)   SpO2 100%   BMI 14.64 kg/m²     Physical " Exam   Constitutional: Well nourished, Well developed; No apparent distress.  His vital signs are reviewed  Psychiatric: Appropriate affect; Alert and oriented  Eyes: Unremarkable  Heart: no edema. Normal rate.   Musculoskeletal: Normal gait and station  GI: Abdomen is soft, non-tender, bladder palbably distended below umbilicus but nontender.   Respiratory: No distress; Unlabored movement; No accessory musculature needed with symmetric movements  Skin: No pallor or diaphoresis  ; Penis and testicles are normal; DANIEL deferred.     Lab Results   Component Value Date    GLUCOSE 95 09/10/2019    BUN 27 (H) 09/10/2019    CREATININE 1.16 09/10/2019    EGFRIFNONA 44 (L) 10/16/2018    EGFRIFAFRI 73 09/10/2019    BCR 23.3 09/10/2019    CO2 30.0 (H) 09/10/2019    CALCIUM 9.2 09/10/2019    PROTENTOTREF 7.6 10/16/2018    ALBUMIN 3.90 09/10/2019    LABIL2 1.3 10/16/2018    AST 19 09/10/2019    ALT 15 09/10/2019     Lab Results   Component Value Date    GLUCOSE 95 09/10/2019    CALCIUM 9.2 09/10/2019     09/10/2019    K 4.5 09/10/2019    CO2 30.0 (H) 09/10/2019     09/10/2019    BUN 27 (H) 09/10/2019    CREATININE 1.16 09/10/2019    EGFRIFAFRI 73 09/10/2019    EGFRIFNONA 44 (L) 10/16/2018    BCR 23.3 09/10/2019    ANIONGAP 8.0 09/10/2019     Lab Results   Component Value Date    WBC 6.35 09/10/2019    HGB 10.0 (L) 09/10/2019    HCT 29.7 (L) 09/10/2019    MCV 88.9 09/10/2019     09/10/2019     Lab Results   Component Value Date    PSA 4.950 (H) 10/16/2018    PSA 4.090 (H) 09/19/2017     No results found for: URINECX  Brief Urine Lab Results  (Last result in the past 365 days)      Color   Clarity   Blood   Leuk Est   Nitrite   Protein   CREAT   Urine HCG        09/10/19 1725 Red Cloudy Large (3+) Moderate (2+) Negative 100 mg/dL (2+)               Imaging Results (last 7 days)     ** No results found for the last 168 hours. **      Outside CT abdomen and pelvis without contrast from yesterday independently  reviewed by me showing markedly distended bladder with bladder diverticulum and a very large prostate.  There is a possible calcification within the bladder consistent with possible bladder stone approximately 1 cm.  There is no discrete hydronephrosis present.  Kidneys appear mildly atrophic with bilateral cystic lesions without obvious mass.      Assessment and Plan  Enlarged prostate with acute urinary retention likely in setting of chronic bladder outlet obstruction with history of hematuria.  Asymptomatic currently.  Normal renal function.    Recommend #1 agree with replacing Rivera catheter for bladder rest #2 continue finasteride and tamsulosin #3 recommend CBI as needed gross hematuria #4 n.p.o. after midnight tonight in case hematuria worsens and/or cystoscopy/clot evacuation is required    Thank for this consultation please call with any questions      (Please note that portions of this note were completed with a voice recognition program.)  Ponce Morgan MD  09/10/19  5:51 PM

## 2019-09-10 NOTE — PROGRESS NOTES
"Subjective   Roman Kenney is a 84 y.o. male.     Chief Complaint   Patient presents with   • Blood in Urine     pt states that he has blood in urine in his cath bag        History of Present Illness     as above      Current Outpatient Medications:   •  acyclovir (ZOVIRAX) 800 MG tablet, TK 1 T PO  EVERY 4 HOURS, Disp: , Rfl: 0  •  ciprofloxacin (CIPRO) 500 MG tablet, Take 1 tablet by mouth 2 (Two) Times a Day., Disp: 20 tablet, Rfl: 0  •  ciprofloxacin (CIPRO) 500 MG tablet, Take 1 tablet by mouth 2 (Two) Times a Day., Disp: 10 tablet, Rfl: 0  •  finasteride (PROSCAR) 5 MG tablet, Take 1 tablet by mouth Daily., Disp: 90 tablet, Rfl: 3  •  HYDROcodone-acetaminophen (NORCO) 5-325 MG per tablet, TK 1 T PO  EVERY 4 HOURS AS NEEDED FOR MODERATE PAIN, Disp: , Rfl: 0  •  tamsulosin (FLOMAX) 0.4 MG capsule 24 hr capsule, Take 1 capsule by mouth Daily., Disp: 90 capsule, Rfl: 3  No Known Allergies    Past Medical History:   Diagnosis Date   • BPH (benign prostatic hypertrophy)    • Epididymitis    • Hypertension    • Hypertension, essential    • Mitral valve regurgitation    • Undiagnosed cardiac murmurs    • Weight loss      Past Surgical History:   Procedure Laterality Date   • COLONOSCOPY      Colon Polyp Removal    • FINGER SURGERY Right     tumor removed from a finger       Review of Systems   Constitutional: Negative.    HENT: Negative.    Eyes: Negative.    Respiratory: Negative.    Cardiovascular: Negative.    Gastrointestinal: Negative.    Endocrine: Negative.    Genitourinary: Positive for difficulty urinating and hematuria.   Musculoskeletal: Negative.    Skin: Negative.    Allergic/Immunologic: Negative.    Neurological: Negative.    Hematological: Negative.    Psychiatric/Behavioral: Negative.        Objective  /76   Pulse 76   Resp 16   Ht 180.3 cm (71\")   Wt 49.9 kg (110 lb)   SpO2 97%   BMI 15.34 kg/m²   Physical Exam   Constitutional: He is oriented to person, place, and time. He appears " well-developed and well-nourished.   HENT:   Head: Normocephalic and atraumatic.   Right Ear: External ear normal.   Left Ear: External ear normal.   Nose: Nose normal.   Mouth/Throat: Oropharynx is clear and moist.   Eyes: Conjunctivae and EOM are normal. Pupils are equal, round, and reactive to light.   Neck: Normal range of motion. Neck supple.   Cardiovascular: Normal rate, regular rhythm, normal heart sounds and intact distal pulses.   Pulmonary/Chest: Effort normal and breath sounds normal.   Abdominal: Soft. Bowel sounds are normal.   Musculoskeletal: Normal range of motion.   Neurological: He is alert and oriented to person, place, and time.   Skin: Skin is warm. Capillary refill takes less than 2 seconds.   Psychiatric: He has a normal mood and affect. His behavior is normal. Judgment and thought content normal.   Nursing note and vitals reviewed.      Assessment/Plan   Roman was seen today for blood in urine.    Diagnoses and all orders for this visit:    Urinary retention      If he cant urinate by 3 pm he is going to [facility] er --I will admit to obs    Patient's Body mass index is 15.34 kg/m². BMI is below normal parameters. Recommendations include: treating the underlying disease process.       No orders of the defined types were placed in this encounter.    Current outpatient and discharge medications have been reconciled for the patient.  Reviewed by: Yariel Hancock MD  Follow up: next appt

## 2019-09-11 VITALS
RESPIRATION RATE: 16 BRPM | OXYGEN SATURATION: 98 % | BODY MASS INDEX: 14.69 KG/M2 | DIASTOLIC BLOOD PRESSURE: 58 MMHG | TEMPERATURE: 98.9 F | HEART RATE: 102 BPM | WEIGHT: 104.94 LBS | HEIGHT: 71 IN | SYSTOLIC BLOOD PRESSURE: 113 MMHG

## 2019-09-11 LAB
ANION GAP SERPL CALCULATED.3IONS-SCNC: 9 MMOL/L (ref 5–15)
BUN BLD-MCNC: 18 MG/DL (ref 8–23)
BUN/CREAT SERPL: 20 (ref 7–25)
CALCIUM SPEC-SCNC: 9.4 MG/DL (ref 8.6–10.5)
CHLORIDE SERPL-SCNC: 104 MMOL/L (ref 98–107)
CO2 SERPL-SCNC: 29 MMOL/L (ref 22–29)
CREAT BLD-MCNC: 0.9 MG/DL (ref 0.76–1.27)
DEPRECATED RDW RBC AUTO: 48 FL (ref 37–54)
ERYTHROCYTE [DISTWIDTH] IN BLOOD BY AUTOMATED COUNT: 14.5 % (ref 12.3–15.4)
GFR SERPL CREATININE-BSD FRML MDRD: 97 ML/MIN/1.73
GLUCOSE BLD-MCNC: 124 MG/DL (ref 65–99)
HCT VFR BLD AUTO: 35 % (ref 37.5–51)
HGB BLD-MCNC: 11.2 G/DL (ref 13–17.7)
MCH RBC QN AUTO: 29.2 PG (ref 26.6–33)
MCHC RBC AUTO-ENTMCNC: 32 G/DL (ref 31.5–35.7)
MCV RBC AUTO: 91.1 FL (ref 79–97)
PLATELET # BLD AUTO: 187 10*3/MM3 (ref 140–450)
PMV BLD AUTO: 11 FL (ref 6–12)
POTASSIUM BLD-SCNC: 4.3 MMOL/L (ref 3.5–5.2)
RBC # BLD AUTO: 3.84 10*6/MM3 (ref 4.14–5.8)
SODIUM BLD-SCNC: 142 MMOL/L (ref 136–145)
WBC NRBC COR # BLD: 6.83 10*3/MM3 (ref 3.4–10.8)

## 2019-09-11 PROCEDURE — G0378 HOSPITAL OBSERVATION PER HR: HCPCS

## 2019-09-11 PROCEDURE — 94799 UNLISTED PULMONARY SVC/PX: CPT

## 2019-09-11 PROCEDURE — 80048 BASIC METABOLIC PNL TOTAL CA: CPT | Performed by: FAMILY MEDICINE

## 2019-09-11 PROCEDURE — 94760 N-INVAS EAR/PLS OXIMETRY 1: CPT

## 2019-09-11 PROCEDURE — 85027 COMPLETE CBC AUTOMATED: CPT | Performed by: FAMILY MEDICINE

## 2019-09-11 PROCEDURE — 99217 PR OBSERVATION CARE DISCHARGE MANAGEMENT: CPT | Performed by: FAMILY MEDICINE

## 2019-09-11 RX ADMIN — TAMSULOSIN HYDROCHLORIDE 0.4 MG: 0.4 CAPSULE ORAL at 09:42

## 2019-09-11 RX ADMIN — FINASTERIDE 5 MG: 5 TABLET, FILM COATED ORAL at 09:42

## 2019-09-11 NOTE — PLAN OF CARE
Problem: Patient Care Overview  Goal: Plan of Care Review   09/11/19 1506   OTHER   Outcome Summary patient home today with gerardo in place to leg pain. urine clear yellow in bag

## 2019-09-11 NOTE — NURSING NOTE
Spoke with Dr. Morgan at bedside. Ok to feed patient Regular diet.  Gerardo with yellow urine noted and no blood. No need for surgery at this time.  Patient to keep gerardo in place and follow up in urology office next Wednesday.

## 2019-09-11 NOTE — H&P
"[facility]  HISTORY AND PHYSICAL    Date of Admission: 9/10/2019  Primary Care Physician: Yariel Hancock MD    Subjective    Chief Complaint: \"I cant pee\"    This is an 84 yr old male with hx of bph who presented initially to the [facility] ed with urinary retention. Rivera catheter was inserted and he presented to my office on day of admission with bleeding. THe catheter was removed, however he could not urinate. AT this point he was admitted to observation for urinary consultation.        Review of Systems   Constitutional: Negative.    HENT: Negative.    Eyes: Negative.    Respiratory: Negative.    Cardiovascular: Negative.    Gastrointestinal: Negative.    Endocrine: Negative.    Genitourinary: Positive for decreased urine volume, difficulty urinating and hematuria.   Musculoskeletal: Negative.    Allergic/Immunologic: Negative.    Neurological: Negative.    Hematological: Negative.    Psychiatric/Behavioral: Negative.         Otherwise complete ROS reviewed and negative except as mentioned in the HPI.      Past Medical History:   Past Medical History:   Diagnosis Date   • BPH (benign prostatic hypertrophy)    • Epididymitis    • Hypertension    • Hypertension, essential    • Mitral valve regurgitation    • Undiagnosed cardiac murmurs    • Weight loss        Past Surgical History:  Past Surgical History:   Procedure Laterality Date   • COLONOSCOPY      Colon Polyp Removal    • FINGER SURGERY Right     tumor removed from a finger       Social History:  reports that he has never smoked. He has never used smokeless tobacco. He reports that he does not drink alcohol or use drugs.    Family History: family history includes Cancer in his father and sister; No Known Problems in his brother, mother, sister, sister, and sister.     Allergies:  No Known Allergies    Medications:  Prior to Admission medications    Medication Sig Start Date End Date Taking? Authorizing Provider   acyclovir (ZOVIRAX) 800 MG " "tablet TK 1 T PO  EVERY 4 HOURS 9/1/19   ProviderKari MD   ciprofloxacin (CIPRO) 500 MG tablet Take 1 tablet by mouth 2 (Two) Times a Day. 7/3/19   Yariel Hancock MD   ciprofloxacin (CIPRO) 500 MG tablet Take 1 tablet by mouth 2 (Two) Times a Day. 7/10/19   Yariel Hancock MD   finasteride (PROSCAR) 5 MG tablet Take 1 tablet by mouth Daily. 11/7/18   Joseph Cha MD   HYDROcodone-acetaminophen (NORCO) 5-325 MG per tablet TK 1 T PO  EVERY 4 HOURS AS NEEDED FOR MODERATE PAIN 9/1/19   ProviderKari MD   tamsulosin (FLOMAX) 0.4 MG capsule 24 hr capsule Take 1 capsule by mouth Daily. 11/7/18   Joseph Cha MD       Objective    Vital Signs: /66 (BP Location: Left arm, Patient Position: Lying)   Pulse 88   Temp 98.9 °F (37.2 °C) (Oral)   Resp 16   Ht 180.3 cm (71\")   Wt 47.6 kg (104 lb 15 oz)   SpO2 100%   BMI 14.64 kg/m²   Physical Exam   Constitutional: He is oriented to person, place, and time. He appears well-developed and well-nourished.   HENT:   Head: Normocephalic and atraumatic.   Right Ear: External ear normal.   Left Ear: External ear normal.   Nose: Nose normal.   Mouth/Throat: Oropharynx is clear and moist.   Eyes: Conjunctivae and EOM are normal. Pupils are equal, round, and reactive to light.   Neck: Normal range of motion. Neck supple.   Cardiovascular: Normal rate, normal heart sounds and intact distal pulses.   Pulmonary/Chest: Effort normal and breath sounds normal.   Abdominal: Soft. Bowel sounds are normal.   Musculoskeletal: Normal range of motion.   Neurological: He is alert and oriented to person, place, and time.   Skin: Skin is warm. Capillary refill takes less than 2 seconds.   Psychiatric: He has a normal mood and affect. His behavior is normal. Judgment and thought content normal.   Nursing note and vitals reviewed.      Results Reviewed:  Lab Results (last 24 hours)     Procedure Component Value Units Date/Time    Urinalysis, " Microscopic Only - Urine, Clean Catch [[id number]]  (Abnormal) Collected:  09/10/19 1725    Specimen:  Urine, Clean Catch Updated:  09/10/19 1748     RBC, UA Too Numerous to Count /HPF      WBC, UA 3-5 /HPF      Bacteria, UA Trace /HPF      Squamous Epithelial Cells, UA 0-2 /HPF      Hyaline Casts, UA None Seen /LPF      Methodology Manual Light Microscopy    Urinalysis With Microscopic If Indicated (No Culture) - Urine, Clean Catch [[id number]]  (Abnormal) Collected:  09/10/19 1725    Specimen:  Urine, Clean Catch Updated:  09/10/19 1738     Color, UA Red     Appearance, UA Cloudy     pH, UA <=5.0     Specific Gravity, UA 1.011     Glucose, UA Negative     Ketones, UA Negative     Bilirubin, UA Negative     Blood, UA Large (3+)     Protein,  mg/dL (2+)     Leuk Esterase, UA Moderate (2+)     Nitrite, UA Negative     Urobilinogen, UA 0.2 E.U./dL    Comprehensive Metabolic Panel [[id number]]  (Abnormal) Collected:  09/10/19 1622    Specimen:  Blood Updated:  09/10/19 1653     Glucose 95 mg/dL      BUN 27 mg/dL      Creatinine 1.16 mg/dL      Sodium 145 mmol/L      Potassium 4.5 mmol/L      Chloride 107 mmol/L      CO2 30.0 mmol/L      Calcium 9.2 mg/dL      Total Protein 6.7 g/dL      Albumin 3.90 g/dL      ALT (SGPT) 15 U/L      AST (SGOT) 19 U/L      Alkaline Phosphatase 76 U/L      Total Bilirubin 0.5 mg/dL      eGFR  African Amer 73 mL/min/1.73      Globulin 2.8 gm/dL      A/G Ratio 1.4 g/dL      BUN/Creatinine Ratio 23.3     Anion Gap 8.0 mmol/L     Narrative:       GFR Normal >60  Chronic Kidney Disease <60  Kidney Failure <15    CBC Auto Differential [[id number]]  (Abnormal) Collected:  09/10/19 1622    Specimen:  Blood Updated:  09/10/19 1633     WBC 6.35 10*3/mm3      RBC 3.34 10*6/mm3      Hemoglobin 10.0 g/dL      Hematocrit 29.7 %      MCV 88.9 fL      MCH 29.9 pg      MCHC 33.7 g/dL      RDW 14.4 %      RDW-SD 46.4 fl      MPV 10.4 fL      Platelets 165 10*3/mm3      Neutrophil % 70.5 %      Lymphocyte  % 17.6 %      Monocyte % 10.6 %      Eosinophil % 0.6 %      Basophil % 0.2 %      Immature Grans % 0.5 %      Neutrophils, Absolute 4.48 10*3/mm3      Lymphocytes, Absolute 1.12 10*3/mm3      Monocytes, Absolute 0.67 10*3/mm3      Eosinophils, Absolute 0.04 10*3/mm3      Basophils, Absolute 0.01 10*3/mm3      Immature Grans, Absolute 0.03 10*3/mm3      nRBC 0.0 /100 WBC         Imaging Results (last 24 hours)     ** No results found for the last 24 hours. **            Active Hospital Problems    Diagnosis   • Urinary retention       Assessment / Plan  Place 3 way gerardo with irrigation. Consult urology---see orders      Code Status: full code     I discussed the patient's findings and my recommendations with the patient and his wife..    Estimated length of stay 24hrs.    Yariel Hancock MD   09/10/19   9:17 PM

## 2019-09-14 ENCOUNTER — HOSPITAL ENCOUNTER (EMERGENCY)
Facility: HOSPITAL | Age: 84
Discharge: HOME | End: 2019-09-14
Admitting: EMERGENCY MEDICINE

## 2019-09-14 VITALS
TEMPERATURE: 98.2 F | RESPIRATION RATE: 18 BRPM | BODY MASS INDEX: 15.4 KG/M2 | HEIGHT: 71 IN | HEART RATE: 75 BPM | WEIGHT: 110 LBS | SYSTOLIC BLOOD PRESSURE: 115 MMHG | OXYGEN SATURATION: 100 % | DIASTOLIC BLOOD PRESSURE: 70 MMHG

## 2019-09-14 DIAGNOSIS — N30.90 CYSTITIS: Primary | ICD-10-CM

## 2019-09-14 DIAGNOSIS — T83.9XXA PROBLEM WITH FOLEY CATHETER, INITIAL ENCOUNTER (HCC): ICD-10-CM

## 2019-09-14 LAB
ALBUMIN SERPL-MCNC: 3.4 G/DL (ref 3.5–5.2)
ALBUMIN/GLOB SERPL: 1.1 G/DL
ALP SERPL-CCNC: 76 U/L (ref 39–117)
ALT SERPL W P-5'-P-CCNC: 18 U/L (ref 1–41)
ANION GAP SERPL CALCULATED.3IONS-SCNC: 9 MMOL/L (ref 5–15)
AST SERPL-CCNC: 19 U/L (ref 1–40)
BACTERIA UR QL AUTO: ABNORMAL /HPF
BASOPHILS # BLD AUTO: 0.02 10*3/MM3 (ref 0–0.2)
BASOPHILS NFR BLD AUTO: 0.4 % (ref 0–1.5)
BILIRUB SERPL-MCNC: 0.3 MG/DL (ref 0.2–1.2)
BILIRUB UR QL STRIP: NEGATIVE
BUN BLD-MCNC: 18 MG/DL (ref 8–23)
BUN/CREAT SERPL: 18.9 (ref 7–25)
CALCIUM SPEC-SCNC: 9 MG/DL (ref 8.6–10.5)
CHLORIDE SERPL-SCNC: 105 MMOL/L (ref 98–107)
CLARITY UR: ABNORMAL
CO2 SERPL-SCNC: 30 MMOL/L (ref 22–29)
COLOR UR: ABNORMAL
CREAT BLD-MCNC: 0.95 MG/DL (ref 0.76–1.27)
DEPRECATED RDW RBC AUTO: 48 FL (ref 37–54)
EOSINOPHIL # BLD AUTO: 0.11 10*3/MM3 (ref 0–0.4)
EOSINOPHIL NFR BLD AUTO: 2.4 % (ref 0.3–6.2)
ERYTHROCYTE [DISTWIDTH] IN BLOOD BY AUTOMATED COUNT: 14.3 % (ref 12.3–15.4)
GFR SERPL CREATININE-BSD FRML MDRD: 92 ML/MIN/1.73
GLOBULIN UR ELPH-MCNC: 3.2 GM/DL
GLUCOSE BLD-MCNC: 95 MG/DL (ref 65–99)
GLUCOSE UR STRIP-MCNC: NEGATIVE MG/DL
HCT VFR BLD AUTO: 33 % (ref 37.5–51)
HGB BLD-MCNC: 10.7 G/DL (ref 13–17.7)
HGB UR QL STRIP.AUTO: ABNORMAL
HYALINE CASTS UR QL AUTO: ABNORMAL /LPF
IMM GRANULOCYTES # BLD AUTO: 0.02 10*3/MM3 (ref 0–0.05)
IMM GRANULOCYTES NFR BLD AUTO: 0.4 % (ref 0–0.5)
KETONES UR QL STRIP: ABNORMAL
LEUKOCYTE ESTERASE UR QL STRIP.AUTO: ABNORMAL
LYMPHOCYTES # BLD AUTO: 1.01 10*3/MM3 (ref 0.7–3.1)
LYMPHOCYTES NFR BLD AUTO: 22.4 % (ref 19.6–45.3)
MCH RBC QN AUTO: 29.8 PG (ref 26.6–33)
MCHC RBC AUTO-ENTMCNC: 32.4 G/DL (ref 31.5–35.7)
MCV RBC AUTO: 91.9 FL (ref 79–97)
MONOCYTES # BLD AUTO: 0.38 10*3/MM3 (ref 0.1–0.9)
MONOCYTES NFR BLD AUTO: 8.4 % (ref 5–12)
NEUTROPHILS # BLD AUTO: 2.96 10*3/MM3 (ref 1.7–7)
NEUTROPHILS NFR BLD AUTO: 66 % (ref 42.7–76)
NITRITE UR QL STRIP: NEGATIVE
NRBC BLD AUTO-RTO: 0 /100 WBC (ref 0–0.2)
PH UR STRIP.AUTO: 6 [PH] (ref 5–8)
PLATELET # BLD AUTO: 198 10*3/MM3 (ref 140–450)
PMV BLD AUTO: 10.7 FL (ref 6–12)
POTASSIUM BLD-SCNC: 4.4 MMOL/L (ref 3.5–5.2)
PROT SERPL-MCNC: 6.6 G/DL (ref 6–8.5)
PROT UR QL STRIP: ABNORMAL
RBC # BLD AUTO: 3.59 10*6/MM3 (ref 4.14–5.8)
RBC # UR: ABNORMAL /HPF
REF LAB TEST METHOD: ABNORMAL
SODIUM BLD-SCNC: 144 MMOL/L (ref 136–145)
SP GR UR STRIP: 1.02 (ref 1–1.03)
SQUAMOUS #/AREA URNS HPF: ABNORMAL /HPF
UROBILINOGEN UR QL STRIP: ABNORMAL
WBC NRBC COR # BLD: 4.5 10*3/MM3 (ref 3.4–10.8)
WBC UR QL AUTO: ABNORMAL /HPF

## 2019-09-14 PROCEDURE — 51702 INSERT TEMP BLADDER CATH: CPT

## 2019-09-14 PROCEDURE — 81001 URINALYSIS AUTO W/SCOPE: CPT | Performed by: PHYSICIAN ASSISTANT

## 2019-09-14 PROCEDURE — 87086 URINE CULTURE/COLONY COUNT: CPT | Performed by: PHYSICIAN ASSISTANT

## 2019-09-14 PROCEDURE — 80053 COMPREHEN METABOLIC PANEL: CPT | Performed by: PHYSICIAN ASSISTANT

## 2019-09-14 PROCEDURE — 85025 COMPLETE CBC W/AUTO DIFF WBC: CPT | Performed by: PHYSICIAN ASSISTANT

## 2019-09-14 PROCEDURE — 99283 EMERGENCY DEPT VISIT LOW MDM: CPT

## 2019-09-14 PROCEDURE — 51798 US URINE CAPACITY MEASURE: CPT

## 2019-09-14 RX ORDER — SULFAMETHOXAZOLE AND TRIMETHOPRIM 800; 160 MG/1; MG/1
1 TABLET ORAL 2 TIMES DAILY
Qty: 20 TABLET | Refills: 0 | Status: SHIPPED | OUTPATIENT
Start: 2019-09-14 | End: 2020-01-23

## 2019-09-15 NOTE — DISCHARGE SUMMARY
"[facility]   DISCHARGE SUMMARY       Date of Admission: 9/10/2019  Date of Discharge:  9/11/2019  Primary Care Physician: Yariel Hancock MD    Presenting Problem/History of Present Illness:  Urinary retention [R33.9]     Final Discharge Diagnoses:  Active Hospital Problems    Diagnosis   • Urinary retention       Consults: urology    Procedures Performed: 3 way gerardo with irrigation    Pertinent Test Results: none    Chief Complaint on Day of Discharge: \"I feel better\"    History of Present Illness on Day of Discharge: recent urinary retention resolved     Hospital Course:  The patient is a 84 y.o. male who presented to [facility] with urinary retention and bleeding into gerardo placed in outside hospital. He was kindly seen by urology who concurred with placement of catheter. His bleeding was minimal and he was discharged to home..      Condition on Discharge:  stable    Physical Exam on Discharge:  /58 (BP Location: Right arm, Patient Position: Lying)   Pulse 102   Temp 98.9 °F (37.2 °C) (Oral)   Resp 16   Ht 180.3 cm (71\")   Wt 47.6 kg (104 lb 15 oz)   SpO2 98%   BMI 14.64 kg/m²   Physical Exam   Constitutional: He appears well-developed and well-nourished.   Cardiovascular: Normal rate, regular rhythm, normal heart sounds and intact distal pulses.   Pulmonary/Chest: Effort normal and breath sounds normal.   Abdominal: Soft. Bowel sounds are normal.   Nursing note and vitals reviewed.        Discharge Disposition:  Home or Self Care    Discharge Medications:     Discharge Medications      Continue These Medications      Instructions Start Date   acyclovir 800 MG tablet  Commonly known as:  ZOVIRAX   TK 1 T PO  EVERY 4 HOURS      finasteride 5 MG tablet  Commonly known as:  PROSCAR   5 mg, Oral, Daily      HYDROcodone-acetaminophen 5-325 MG per tablet  Commonly known as:  NORCO   TK 1 T PO  EVERY 4 HOURS AS NEEDED FOR MODERATE PAIN      tamsulosin 0.4 MG capsule 24 hr " capsule  Commonly known as:  FLOMAX   1 capsule, Oral, Daily         Stop These Medications    ciprofloxacin 500 MG tablet  Commonly known as:  CIPRO            Discharge Diet:   As tolerated  Activity at Discharge:   As toleratd  Discharge Care Plan/Instructions: gerardo cath care    Follow-up Appointments:   Future Appointments   Date Time Provider Department Center   9/18/2019  8:30 AM Sydnie Ingram APRN [facility] None   9/19/2019  9:15 AM Yariel Hancock MD [facility] None   9/23/2019  9:30 AM [facility] NP [facility]   11/13/2019  8:30 AM Joseph Cha MD [facility] None   12/10/2019 10:00 AM Yariel Hancock MD [facility] None       Test Results Pending at Discharge: none    Yariel Hancock MD  09/14/19  8:14 PM    Time: 8:17pm

## 2019-09-16 ENCOUNTER — EPISODE CHANGES (OUTPATIENT)
Dept: CASE MANAGEMENT | Facility: OTHER | Age: 84
End: 2019-09-16

## 2019-09-16 ENCOUNTER — PATIENT OUTREACH (OUTPATIENT)
Dept: CASE MANAGEMENT | Facility: OTHER | Age: 84
End: 2019-09-16

## 2019-09-16 LAB — BACTERIA SPEC AEROBE CULT: NO GROWTH

## 2019-09-16 NOTE — OUTREACH NOTE
Care Coordination Note    RN-CC provided clinical update to Dr. Hancock's nurse via voicemail message. RN-CC requested home health be ordered if deemed appropriate per provider.     Crystal Cote RN  Community Care Coordinator    9/16/2019, 3:49 PM

## 2019-09-16 NOTE — PROGRESS NOTES
Mr. Kenney is 84 y.o. male    Chief Complaint   Patient presents with   • Urinary Retention       History of Present Illness  Patient presents for hospital follow-up after episode of urinary retention.  Patient presented to our ER complaining of catheter issues related to a Rivera catheter placed in outside facility because of urinary retention.  Patient reports his catheter lost patency related to blood clots.  The catheter was changed in our ER and remains in place today.  Patient reports hematuria has since resolved.  He denies any fevers, chills, nausea, vomiting.    The following portions of the patient's history were reviewed and updated as appropriate: allergies, current medications, past family history, past medical history, past social history, past surgical history and problem list.    Review of Systems   Constitutional: Negative for chills and fever.   Gastrointestinal: Negative for abdominal distention, abdominal pain, anal bleeding, blood in stool, nausea and vomiting.   Genitourinary: Positive for difficulty urinating and hematuria. Negative for decreased urine volume, discharge, dysuria, enuresis, flank pain, frequency, genital sores, penile pain, penile swelling, scrotal swelling, testicular pain and urgency.         Current Outpatient Medications:   •  finasteride (PROSCAR) 5 MG tablet, Take 1 tablet by mouth Daily., Disp: 90 tablet, Rfl: 3  •  HYDROcodone-acetaminophen (NORCO) 5-325 MG per tablet, TK 1 T PO  EVERY 4 HOURS AS NEEDED FOR MODERATE PAIN, Disp: , Rfl: 0  •  sulfamethoxazole-trimethoprim (BACTRIM DS,SEPTRA DS) 800-160 MG per tablet, Take 1 tablet by mouth 2 (Two) Times a Day., Disp: 20 tablet, Rfl: 0  •  tamsulosin (FLOMAX) 0.4 MG capsule 24 hr capsule, Take 1 capsule by mouth Daily., Disp: 90 capsule, Rfl: 3  •  acyclovir (ZOVIRAX) 800 MG tablet, TK 1 T PO  EVERY 4 HOURS, Disp: , Rfl: 0    Past Medical History:   Diagnosis Date   • BPH (benign prostatic hypertrophy)    •  "Epididymitis    • Hypertension    • Hypertension, essential    • Mitral valve regurgitation    • Undiagnosed cardiac murmurs    • Weight loss        Past Surgical History:   Procedure Laterality Date   • COLONOSCOPY      Colon Polyp Removal    • FINGER SURGERY Right     tumor removed from a finger       Social History     Socioeconomic History   • Marital status:      Spouse name: Not on file   • Number of children: Not on file   • Years of education: Not on file   • Highest education level: Not on file   Tobacco Use   • Smoking status: Never Smoker   • Smokeless tobacco: Never Used   Substance and Sexual Activity   • Alcohol use: No   • Drug use: No   • Sexual activity: Defer       Family History   Problem Relation Age of Onset   • No Known Problems Mother    • Cancer Father    • Cancer Sister    • No Known Problems Brother    • No Known Problems Sister    • No Known Problems Sister    • No Known Problems Sister        Objective    Temp 99.4 °F (37.4 °C)   Ht 180.3 cm (70.98\")   Wt 49.9 kg (110 lb)   BMI 15.35 kg/m²     Physical Exam   Constitutional: He is oriented to person, place, and time. He appears well-developed and well-nourished.   HENT:   Head: Normocephalic.   Pulmonary/Chest: Effort normal. No respiratory distress.   Abdominal: He exhibits no distension.   Musculoskeletal: He exhibits no edema.   Neurological: He is alert and oriented to person, place, and time.   Skin: Skin is warm and dry.   Psychiatric: He has a normal mood and affect. His behavior is normal.   Vitals reviewed.      Patient's Body mass index is 15.35 kg/m². BMI is below normal parameters. Recommendations include: treating the underlying disease process.      Admission on 09/14/2019, Discharged on 09/14/2019   Component Date Value Ref Range Status   • Glucose 09/14/2019 95  65 - 99 mg/dL Final   • BUN 09/14/2019 18  8 - 23 mg/dL Final   • Creatinine 09/14/2019 0.95  0.76 - 1.27 mg/dL Final   • Sodium 09/14/2019 144  136 - " 145 mmol/L Final   • Potassium 09/14/2019 4.4  3.5 - 5.2 mmol/L Final   • Chloride 09/14/2019 105  98 - 107 mmol/L Final   • CO2 09/14/2019 30.0* 22.0 - 29.0 mmol/L Final   • Calcium 09/14/2019 9.0  8.6 - 10.5 mg/dL Final   • Total Protein 09/14/2019 6.6  6.0 - 8.5 g/dL Final   • Albumin 09/14/2019 3.40* 3.50 - 5.20 g/dL Final   • ALT (SGPT) 09/14/2019 18  1 - 41 U/L Final   • AST (SGOT) 09/14/2019 19  1 - 40 U/L Final   • Alkaline Phosphatase 09/14/2019 76  39 - 117 U/L Final   • Total Bilirubin 09/14/2019 0.3  0.2 - 1.2 mg/dL Final   • eGFR   Amer 09/14/2019 92  >60 mL/min/1.73 Final   • Globulin 09/14/2019 3.2  gm/dL Final   • A/G Ratio 09/14/2019 1.1  g/dL Final   • BUN/Creatinine Ratio 09/14/2019 18.9  7.0 - 25.0 Final   • Anion Gap 09/14/2019 9.0  5.0 - 15.0 mmol/L Final   • Color, UA 09/14/2019 Red* Yellow, Straw Final   • Appearance, UA 09/14/2019 Cloudy* Clear Final   • pH, UA 09/14/2019 6.0  5.0 - 8.0 Final   • Specific Gravity, UA 09/14/2019 1.025  >1.005-<1.030 Final   • Glucose, UA 09/14/2019 Negative  Negative Final   • Ketones, UA 09/14/2019 Trace* Negative Final   • Bilirubin, UA 09/14/2019 Negative  Negative Final   • Blood, UA 09/14/2019 Large (3+)* Negative Final   • Protein, UA 09/14/2019 >=300 mg/dL (3+)* Negative Final   • Leuk Esterase, UA 09/14/2019 Small (1+)* Negative Final   • Nitrite, UA 09/14/2019 Negative  Negative Final   • Urobilinogen, UA 09/14/2019 1.0 E.U./dL  0.2 - 1.0 E.U./dL Final   • WBC 09/14/2019 4.50  3.40 - 10.80 10*3/mm3 Final   • RBC 09/14/2019 3.59* 4.14 - 5.80 10*6/mm3 Final   • Hemoglobin 09/14/2019 10.7* 13.0 - 17.7 g/dL Final   • Hematocrit 09/14/2019 33.0* 37.5 - 51.0 % Final   • MCV 09/14/2019 91.9  79.0 - 97.0 fL Final   • MCH 09/14/2019 29.8  26.6 - 33.0 pg Final   • MCHC 09/14/2019 32.4  31.5 - 35.7 g/dL Final   • RDW 09/14/2019 14.3  12.3 - 15.4 % Final   • RDW-SD 09/14/2019 48.0  37.0 - 54.0 fl Final   • MPV 09/14/2019 10.7  6.0 - 12.0 fL Final   •  Platelets 09/14/2019 198  140 - 450 10*3/mm3 Final   • Neutrophil % 09/14/2019 66.0  42.7 - 76.0 % Final   • Lymphocyte % 09/14/2019 22.4  19.6 - 45.3 % Final   • Monocyte % 09/14/2019 8.4  5.0 - 12.0 % Final   • Eosinophil % 09/14/2019 2.4  0.3 - 6.2 % Final   • Basophil % 09/14/2019 0.4  0.0 - 1.5 % Final   • Immature Grans % 09/14/2019 0.4  0.0 - 0.5 % Final   • Neutrophils, Absolute 09/14/2019 2.96  1.70 - 7.00 10*3/mm3 Final   • Lymphocytes, Absolute 09/14/2019 1.01  0.70 - 3.10 10*3/mm3 Final   • Monocytes, Absolute 09/14/2019 0.38  0.10 - 0.90 10*3/mm3 Final   • Eosinophils, Absolute 09/14/2019 0.11  0.00 - 0.40 10*3/mm3 Final   • Basophils, Absolute 09/14/2019 0.02  0.00 - 0.20 10*3/mm3 Final   • Immature Grans, Absolute 09/14/2019 0.02  0.00 - 0.05 10*3/mm3 Final   • nRBC 09/14/2019 0.0  0.0 - 0.2 /100 WBC Final   • RBC, UA 09/14/2019 Too Numerous to Count* None Seen /HPF Final   • WBC, UA 09/14/2019 6-12* None Seen /HPF Final   • Bacteria, UA 09/14/2019 1+* None Seen /HPF Final   • Squamous Epithelial Cells, UA 09/14/2019 0-2  None Seen, 0-2 /HPF Final   • Hyaline Casts, UA 09/14/2019 0-2  None Seen /LPF Final   • Methodology 09/14/2019 Automated Microscopy   Final   • Urine Culture 09/14/2019 No growth   Final       Results for orders placed or performed during the hospital encounter of 09/14/19   Urine Culture - Urine, Urine, Catheter   Result Value Ref Range    Urine Culture No growth    Comprehensive Metabolic Panel   Result Value Ref Range    Glucose 95 65 - 99 mg/dL    BUN 18 8 - 23 mg/dL    Creatinine 0.95 0.76 - 1.27 mg/dL    Sodium 144 136 - 145 mmol/L    Potassium 4.4 3.5 - 5.2 mmol/L    Chloride 105 98 - 107 mmol/L    CO2 30.0 (H) 22.0 - 29.0 mmol/L    Calcium 9.0 8.6 - 10.5 mg/dL    Total Protein 6.6 6.0 - 8.5 g/dL    Albumin 3.40 (L) 3.50 - 5.20 g/dL    ALT (SGPT) 18 1 - 41 U/L    AST (SGOT) 19 1 - 40 U/L    Alkaline Phosphatase 76 39 - 117 U/L    Total Bilirubin 0.3 0.2 - 1.2 mg/dL    eGFR    Amer 92 >60 mL/min/1.73    Globulin 3.2 gm/dL    A/G Ratio 1.1 g/dL    BUN/Creatinine Ratio 18.9 7.0 - 25.0    Anion Gap 9.0 5.0 - 15.0 mmol/L   Urinalysis With Culture If Indicated - Urine, Catheter   Result Value Ref Range    Color, UA Red (A) Yellow, Straw    Appearance, UA Cloudy (A) Clear    pH, UA 6.0 5.0 - 8.0    Specific [place], UA 1.025 >1.005-<1.030    Glucose, UA Negative Negative    Ketones, UA Trace (A) Negative    Bilirubin, UA Negative Negative    Blood, UA Large (3+) (A) Negative    Protein, UA >=300 mg/dL (3+) (A) Negative    Leuk Esterase, UA Small (1+) (A) Negative    Nitrite, UA Negative Negative    Urobilinogen, UA 1.0 E.U./dL 0.2 - 1.0 E.U./dL   CBC Auto Differential   Result Value Ref Range    WBC 4.50 3.40 - 10.80 10*3/mm3    RBC 3.59 (L) 4.14 - 5.80 10*6/mm3    Hemoglobin 10.7 (L) 13.0 - 17.7 g/dL    Hematocrit 33.0 (L) 37.5 - 51.0 %    MCV 91.9 79.0 - 97.0 fL    MCH 29.8 26.6 - 33.0 pg    MCHC 32.4 31.5 - 35.7 g/dL    RDW 14.3 12.3 - 15.4 %    RDW-SD 48.0 37.0 - 54.0 fl    MPV 10.7 6.0 - 12.0 fL    Platelets 198 140 - 450 10*3/mm3    Neutrophil % 66.0 42.7 - 76.0 %    Lymphocyte % 22.4 19.6 - 45.3 %    Monocyte % 8.4 5.0 - 12.0 %    Eosinophil % 2.4 0.3 - 6.2 %    Basophil % 0.4 0.0 - 1.5 %    Immature Grans % 0.4 0.0 - 0.5 %    Neutrophils, Absolute 2.96 1.70 - 7.00 10*3/mm3    Lymphocytes, Absolute 1.01 0.70 - 3.10 10*3/mm3    Monocytes, Absolute 0.38 0.10 - 0.90 10*3/mm3    Eosinophils, Absolute 0.11 0.00 - 0.40 10*3/mm3    Basophils, Absolute 0.02 0.00 - 0.20 10*3/mm3    Immature Grans, Absolute 0.02 0.00 - 0.05 10*3/mm3    nRBC 0.0 0.0 - 0.2 /100 WBC   Urinalysis, Microscopic Only - Urine, Catheter   Result Value Ref Range    RBC, UA Too Numerous to Count (A) None Seen /HPF    WBC, UA 6-12 (A) None Seen /HPF    Bacteria, UA 1+ (A) None Seen /HPF    Squamous Epithelial Cells, UA 0-2 None Seen, 0-2 /HPF    Hyaline Casts, UA 0-2 None Seen /LPF    Methodology Automated Microscopy        Assessment/Plan   Assessment and Plan    Roman was seen today for urinary retention.    Diagnoses and all orders for this visit:    Retention, urine    Benign prostatic hyperplasia with urinary obstruction    Patient presents for follow-up of urinary retention.  He currently has catheter in place.  We will attempt a voiding trial today.  If voiding trial fails, we will plan for cystoscopic evaluation with Dr. Cha unfortunately, I do not have records from his initial episode of urinary retention.  I will request these today.  I do however have the radiologist report from his CT obtained at an outside facility.  There is mention of a possible bladder diverticulum as well as a bladder stone.

## 2019-09-18 ENCOUNTER — HOSPITAL ENCOUNTER (EMERGENCY)
Facility: HOSPITAL | Age: 84
Discharge: HOME | End: 2019-09-18
Attending: INTERNAL MEDICINE | Admitting: INTERNAL MEDICINE

## 2019-09-18 ENCOUNTER — OFFICE VISIT (OUTPATIENT)
Dept: UROLOGY | Facility: CLINIC | Age: 84
End: 2019-09-18

## 2019-09-18 VITALS
DIASTOLIC BLOOD PRESSURE: 75 MMHG | RESPIRATION RATE: 18 BRPM | SYSTOLIC BLOOD PRESSURE: 112 MMHG | OXYGEN SATURATION: 99 % | WEIGHT: 114 LBS | HEART RATE: 68 BPM | HEIGHT: 71 IN | TEMPERATURE: 98.3 F | BODY MASS INDEX: 15.96 KG/M2

## 2019-09-18 VITALS — BODY MASS INDEX: 15.4 KG/M2 | TEMPERATURE: 99.4 F | WEIGHT: 110 LBS | HEIGHT: 71 IN

## 2019-09-18 DIAGNOSIS — R33.9 URINARY RETENTION: Primary | ICD-10-CM

## 2019-09-18 DIAGNOSIS — R33.9 RETENTION, URINE: Primary | ICD-10-CM

## 2019-09-18 DIAGNOSIS — N13.8 BENIGN PROSTATIC HYPERPLASIA WITH URINARY OBSTRUCTION: ICD-10-CM

## 2019-09-18 DIAGNOSIS — N40.1 BENIGN PROSTATIC HYPERPLASIA WITH URINARY OBSTRUCTION: ICD-10-CM

## 2019-09-18 LAB
BACTERIA UR QL AUTO: ABNORMAL /HPF
BILIRUB UR QL STRIP: NEGATIVE
CLARITY UR: CLEAR
COLOR UR: ABNORMAL
GLUCOSE UR STRIP-MCNC: NEGATIVE MG/DL
HGB UR QL STRIP.AUTO: ABNORMAL
HYALINE CASTS UR QL AUTO: ABNORMAL /LPF
KETONES UR QL STRIP: NEGATIVE
LEUKOCYTE ESTERASE UR QL STRIP.AUTO: ABNORMAL
NITRITE UR QL STRIP: NEGATIVE
PH UR STRIP.AUTO: 6.5 [PH] (ref 5–8)
PROT UR QL STRIP: ABNORMAL
RBC # UR: ABNORMAL /HPF
REF LAB TEST METHOD: ABNORMAL
SP GR UR STRIP: 1.01 (ref 1–1.03)
SQUAMOUS #/AREA URNS HPF: ABNORMAL /HPF
UROBILINOGEN UR QL STRIP: ABNORMAL
WBC UR QL AUTO: ABNORMAL /HPF

## 2019-09-18 PROCEDURE — 99283 EMERGENCY DEPT VISIT LOW MDM: CPT

## 2019-09-18 PROCEDURE — 99213 OFFICE O/P EST LOW 20 MIN: CPT | Performed by: NURSE PRACTITIONER

## 2019-09-18 PROCEDURE — 51702 INSERT TEMP BLADDER CATH: CPT

## 2019-09-18 PROCEDURE — 81001 URINALYSIS AUTO W/SCOPE: CPT | Performed by: INTERNAL MEDICINE

## 2019-09-18 PROCEDURE — 51798 US URINE CAPACITY MEASURE: CPT

## 2019-09-19 ENCOUNTER — PATIENT OUTREACH (OUTPATIENT)
Dept: CASE MANAGEMENT | Facility: OTHER | Age: 84
End: 2019-09-19

## 2019-09-19 ENCOUNTER — TELEPHONE (OUTPATIENT)
Dept: FAMILY MEDICINE CLINIC | Facility: CLINIC | Age: 84
End: 2019-09-19

## 2019-09-19 ENCOUNTER — OFFICE VISIT (OUTPATIENT)
Dept: FAMILY MEDICINE CLINIC | Facility: CLINIC | Age: 84
End: 2019-09-19

## 2019-09-19 VITALS
DIASTOLIC BLOOD PRESSURE: 70 MMHG | RESPIRATION RATE: 16 BRPM | OXYGEN SATURATION: 98 % | WEIGHT: 114 LBS | HEIGHT: 71 IN | BODY MASS INDEX: 15.96 KG/M2 | SYSTOLIC BLOOD PRESSURE: 102 MMHG | HEART RATE: 90 BPM

## 2019-09-19 DIAGNOSIS — R33.9 URINARY RETENTION: Primary | ICD-10-CM

## 2019-09-19 PROCEDURE — 99213 OFFICE O/P EST LOW 20 MIN: CPT | Performed by: FAMILY MEDICINE

## 2019-09-19 NOTE — OUTREACH NOTE
Care Coordination Note    Noted patient had ED visit 9-18-19 at [facility] for urinary retention after Rivera removed at urology office earlier yesterday. Spoke with Pat at Dr. Hancock's office and requested that home health be ordered for Rivera education/complications if he feels this is appropriate.     Crystal Cote, RN  Community Care Coordinator    9/19/2019, 8:37 AM

## 2019-09-19 NOTE — OUTREACH NOTE
Care Coordination Note    [facility] and [facility] will [facility] the patient tomorrow.     Crystal Cote, RN  Community Care Coordinator    9/19/2019, 2:26 PM

## 2019-09-19 NOTE — PROGRESS NOTES
"Subjective   Roman Kenney is a 84 y.o. male.     Chief Complaint   Patient presents with   • Follow-up      hosp f/u   also-pt was in  ER last night         History of Present Illness     as above ---still with urinary retention using gerardo catheter--see message from nrusing staff this am      Current Outpatient Medications:   •  acyclovir (ZOVIRAX) 800 MG tablet, TK 1 T PO  EVERY 4 HOURS, Disp: , Rfl: 0  •  finasteride (PROSCAR) 5 MG tablet, Take 1 tablet by mouth Daily., Disp: 90 tablet, Rfl: 3  •  HYDROcodone-acetaminophen (NORCO) 5-325 MG per tablet, TK 1 T PO  EVERY 4 HOURS AS NEEDED FOR MODERATE PAIN, Disp: , Rfl: 0  •  sulfamethoxazole-trimethoprim (BACTRIM DS,SEPTRA DS) 800-160 MG per tablet, Take 1 tablet by mouth 2 (Two) Times a Day., Disp: 20 tablet, Rfl: 0  •  tamsulosin (FLOMAX) 0.4 MG capsule 24 hr capsule, Take 1 capsule by mouth Daily., Disp: 90 capsule, Rfl: 3  No Known Allergies    Past Medical History:   Diagnosis Date   • BPH (benign prostatic hypertrophy)    • Epididymitis    • Hypertension    • Hypertension, essential    • Mitral valve regurgitation    • Undiagnosed cardiac murmurs    • Weight loss      Past Surgical History:   Procedure Laterality Date   • COLONOSCOPY      Colon Polyp Removal    • FINGER SURGERY Right     tumor removed from a finger       Review of Systems   HENT: Negative.    Eyes: Negative.    Respiratory: Negative.    Cardiovascular: Negative.    Gastrointestinal: Negative.    Endocrine: Negative.    Genitourinary: Positive for decreased urine volume, difficulty urinating and hematuria.   Musculoskeletal: Negative.    Skin: Negative.    Allergic/Immunologic: Negative.    Neurological: Negative.    Hematological: Negative.    Psychiatric/Behavioral: Negative.        Objective  /70   Pulse 90   Resp 16   Ht 180.3 cm (71\")   Wt 51.7 kg (114 lb)   SpO2 98%   BMI 15.90 kg/m²   Physical Exam   Constitutional: He is oriented to person, place, and time. He " appears well-developed and well-nourished.   HENT:   Head: Normocephalic and atraumatic.   Right Ear: External ear normal.   Left Ear: External ear normal.   Nose: Nose normal.   Mouth/Throat: Oropharynx is clear and moist.   Eyes: Conjunctivae and EOM are normal. Pupils are equal, round, and reactive to light.   Neck: Normal range of motion. Neck supple.   Cardiovascular: Normal rate, regular rhythm, normal heart sounds and intact distal pulses.   Pulmonary/Chest: Effort normal and breath sounds normal.   Abdominal: Soft. Bowel sounds are normal.   Musculoskeletal: Normal range of motion.   Neurological: He is alert and oriented to person, place, and time.   Skin: Skin is warm. Capillary refill takes less than 2 seconds.   Psychiatric: He has a normal mood and affect. His behavior is normal. Judgment and thought content normal.   Nursing note and vitals reviewed.      Assessment/Plan   Roman was seen today for follow-up.    Diagnoses and all orders for this visit:    Urinary retention  -     Ambulatory Referral to Home Health      Patient's Body mass index is 15.9 kg/m². BMI is below normal parameters. Recommendations include: treating the underlying disease process.           Orders Placed This Encounter   Procedures   • Ambulatory Referral to Home Health     Referral Priority:   Routine     Referral Type:   Home Health     Referral Reason:   Specialty Services Required     Requested Specialty:   Home Health Services     Number of Visits Requested:   1     Current outpatient and discharge medications have been reconciled for the patient.  Reviewed by: Yariel Hancock MD  Follow up: 8 week(s)

## 2019-09-19 NOTE — DISCHARGE INSTRUCTIONS
Acute Urinary Retention, Male    Acute urinary retention means that you cannot pee (urinate) at all, or that you pee too little and your bladder is not emptied completely. If it is not treated, it can lead to kidney damage or other serious problems.  Follow these instructions at home:  · Take over-the-counter and prescription medicines only as told by your doctor. Ask your doctor what medicines you should stay away from. Do not take any medicine unless your doctor says it is okay to do so.  · If you were sent home with a tube that drains the bladder (catheter), take care of it as told by your doctor.  · Drink enough fluid to keep your pee clear or pale yellow.  · If you were given an antibiotic, take it as told by your doctor. Do not stop taking the antibiotic even if you start to feel better.  · Do not use any products that contain nicotine or tobacco, such as cigarettes and e-cigarettes. If you need help quitting, ask your doctor.  · Watch for changes in your symptoms. Tell your doctor about them.  · If told, track changes in your blood pressure at home. Tell your doctor about them.  · Keep all follow-up visits as told by your doctor. This is important.  Contact a doctor if:  · You have spasms or you leak pee when you have spasms.  Get help right away if:  · You have chills or a fever.  · You have a tube that drains the bladder and:  ? The tube stops draining pee.  ? The tube falls out.  · You have blood in your pee.  Summary  · Acute urinary retention means that you cannot pee at all, or that you pee too little and your bladder is not emptied completely. If it is not treated, it can result in kidney damage or other serious problems.  · If you were sent home with a tube that drains the bladder, take care of it as told by your doctor.  · Monitor any changes in your symptoms. Tell your doctor about any changes.  This information is not intended to replace advice given to you by your health care provider. Make sure  you discuss any questions you have with your health care provider.  Document Released: 06/05/2009 Document Revised: 01/19/2018 Document Reviewed: 01/19/2018  Elsevier Interactive Patient Education © 2019 [employer]

## 2019-09-19 NOTE — OUTREACH NOTE
Care Coordination Note    Received call from Pat with Dr. Hancock that home health has been ordered for the patient.    Crystal Cote RN  Community Care Coordinator    9/19/2019, 2:25 PM

## 2019-09-19 NOTE — ED PROVIDER NOTES
Subjective   Mr. Kenney is a very pleasant 84-year-old white male who is a patient of Dr. Jonas he has had recurring issues with benign prostatic hypertrophy and urinary outlet obstruction.  The patient was recently seen in the emergency room for urinary outlet obstruction and had a three-way Rivera placed with irrigation and that was discharged home with a Rivera in place he did quite well at home with the Rivera in place but after having the Rivera removed he states he has been unable to urinate he presents with suprapubic discomfort and inability to urinate.  He denies fevers chills dysuria he denies nausea vomiting diarrhea and is otherwise without complaint.            Review of Systems   Constitutional: Negative for chills and fever.   HENT: Negative for congestion, ear pain and sinus pressure.    Eyes: Negative for photophobia, pain and visual disturbance.   Respiratory: Negative for cough, chest tightness, shortness of breath and wheezing.    Cardiovascular: Negative for chest pain and palpitations.   Gastrointestinal: Negative for abdominal pain, diarrhea and nausea.   Endocrine: Negative for cold intolerance and heat intolerance.   Genitourinary: Positive for difficulty urinating. Negative for urgency.   Musculoskeletal: Negative for arthralgias, joint swelling and myalgias.   Skin: Negative for color change and wound.   Neurological: Negative for dizziness and headaches.   Hematological: Negative for adenopathy. Does not bruise/bleed easily.   Psychiatric/Behavioral: Negative for agitation, behavioral problems, confusion and decreased concentration.       Past Medical History:   Diagnosis Date   • BPH (benign prostatic hypertrophy)    • Epididymitis    • Hypertension    • Hypertension, essential    • Mitral valve regurgitation    • Undiagnosed cardiac murmurs    • Weight loss        No Known Allergies    Past Surgical History:   Procedure Laterality Date   • COLONOSCOPY      Colon Polyp Removal    •  FINGER SURGERY Right     tumor removed from a finger       Family History   Problem Relation Age of Onset   • No Known Problems Mother    • Cancer Father    • Cancer Sister    • No Known Problems Brother    • No Known Problems Sister    • No Known Problems Sister    • No Known Problems Sister        Social History     Socioeconomic History   • Marital status:      Spouse name: Not on file   • Number of children: Not on file   • Years of education: Not on file   • Highest education level: Not on file   Tobacco Use   • Smoking status: Never Smoker   • Smokeless tobacco: Never Used   Substance and Sexual Activity   • Alcohol use: No   • Drug use: No   • Sexual activity: Defer           Objective   Physical Exam   Constitutional: He is oriented to person, place, and time. He appears well-developed and well-nourished.   HENT:   Head: Normocephalic and atraumatic.   Mouth/Throat: Oropharynx is clear and moist.   Eyes: EOM are normal. Pupils are equal, round, and reactive to light.   Neck: Normal range of motion. Neck supple.   Cardiovascular: Normal rate, regular rhythm and intact distal pulses.   Murmur (systolic) heard.  Pulmonary/Chest: Effort normal and breath sounds normal.   Abdominal: Soft. Bowel sounds are normal. He exhibits no mass. There is tenderness (suprapubic). There is no rebound and no guarding.   Musculoskeletal: Normal range of motion. He exhibits no edema.   Neurological: He is alert and oriented to person, place, and time. He has normal reflexes. No cranial nerve deficit.   Skin: Skin is warm and dry. No rash noted.   Psychiatric: He has a normal mood and affect. His behavior is normal. Thought content normal.   Nursing note and vitals reviewed.      Procedures           ED Course  ED Course as of Sep 18 2028   Wed Sep 18, 2019   1937 I asked the nurses to perform a bladder scan they found greater than 900 mL's of urine retained in bladder Rivera was placed with relief of his discomfort.  [RW]    2026 Urinalysis is unremarkable other than some blood there is only a small amount of leukocytes not consistent with urinary tract infection.  Patient will be discharged home with Rivera catheter follow-up with urology as an outpatient.  [RW]      ED Course User Index  [RW] Rg Silver MD                  [facility]    Final diagnoses:   Urinary retention              Rg Silver MD  09/18/19 2028

## 2019-09-23 ENCOUNTER — OFFICE VISIT (OUTPATIENT)
Dept: CARDIOLOGY | Facility: CLINIC | Age: 84
End: 2019-09-23

## 2019-09-23 VITALS
SYSTOLIC BLOOD PRESSURE: 104 MMHG | BODY MASS INDEX: 15.4 KG/M2 | OXYGEN SATURATION: 98 % | WEIGHT: 110 LBS | HEIGHT: 71 IN | HEART RATE: 81 BPM | DIASTOLIC BLOOD PRESSURE: 60 MMHG

## 2019-09-23 DIAGNOSIS — N40.0 BENIGN NON-NODULAR PROSTATIC HYPERPLASIA WITHOUT LOWER URINARY TRACT SYMPTOMS: ICD-10-CM

## 2019-09-23 DIAGNOSIS — I34.0 NON-RHEUMATIC MITRAL REGURGITATION: Primary | ICD-10-CM

## 2019-09-23 DIAGNOSIS — I10 ESSENTIAL HYPERTENSION: ICD-10-CM

## 2019-09-23 DIAGNOSIS — I34.1 MITRAL VALVE PROLAPSE: ICD-10-CM

## 2019-09-23 DIAGNOSIS — R63.6 PATIENT UNDERWEIGHT: ICD-10-CM

## 2019-09-23 PROCEDURE — 99214 OFFICE O/P EST MOD 30 MIN: CPT | Performed by: NURSE PRACTITIONER

## 2019-09-23 NOTE — PROGRESS NOTES
Subjective:     Encounter Date:09/23/2019      Patient ID: Roman Kenney is a 84 y.o. male with a history of HTN, MVP, and severe MR per echo.  He presents for routine follow up today.    Chief Complaint: Follow up valve disease  Mitral Valve Prolapse   This is a chronic problem. The current episode started more than 1 year ago. Associated symptoms include anorexia. Pertinent negatives include no abdominal pain, chest pain, chills, congestion, coughing, diaphoresis, fatigue, fever, nausea, vomiting or weakness.   Hypertension   This is a chronic problem. The current episode started more than 1 year ago. The problem is controlled. Pertinent negatives include no anxiety, chest pain, malaise/fatigue, orthopnea, palpitations, peripheral edema, PND or shortness of breath. Risk factors for coronary artery disease include male gender. Past treatments include ACE inhibitors. Current antihypertension treatment includes alpha 1 blockers. There are no compliance problems.  There is no history of angina or CAD/MI.     Mr. Kenney presents today for follow up. He has no complaints. He has known severe valvular disease and continues to state that he has had no side effects or symptoms related to this.  He denies near syncope, syncope, dizziness, lightheadedness.  He denies shortness of breath, dyspnea with exertion, or fatigue.  His stamina is unchanged and he feels well.  He recently was treated for shingles and is following with urology due to enlarged prostate with difficulty urinating. He has a catheter.      The following portions of the patient's history were reviewed and updated as appropriate: allergies, current medications, past family history, past medical history, past social history and past surgical history.     No Known Allergies      Current Outpatient Medications:   •  finasteride (PROSCAR) 5 MG tablet, Take 1 tablet by mouth Daily., Disp: 90 tablet, Rfl: 3  •  HYDROcodone-acetaminophen (NORCO)  5-325 MG per tablet, TK 1 T PO  EVERY 4 HOURS AS NEEDED FOR MODERATE PAIN, Disp: , Rfl: 0  •  sulfamethoxazole-trimethoprim (BACTRIM DS,SEPTRA DS) 800-160 MG per tablet, Take 1 tablet by mouth 2 (Two) Times a Day., Disp: 20 tablet, Rfl: 0  •  tamsulosin (FLOMAX) 0.4 MG capsule 24 hr capsule, Take 1 capsule by mouth Daily., Disp: 90 capsule, Rfl: 3  •  acyclovir (ZOVIRAX) 800 MG tablet, TK 1 T PO  EVERY 4 HOURS, Disp: , Rfl: 0    Past Medical History:   Diagnosis Date   • BPH (benign prostatic hypertrophy)    • Epididymitis    • Hypertension    • Hypertension, essential    • Mitral valve regurgitation    • Undiagnosed cardiac murmurs    • Weight loss      Family History   Problem Relation Age of Onset   • No Known Problems Mother    • Cancer Father    • Cancer Sister    • No Known Problems Brother    • No Known Problems Sister    • No Known Problems Sister    • No Known Problems Sister      Social History     Socioeconomic History   • Marital status:      Spouse name: Not on file   • Number of children: Not on file   • Years of education: Not on file   • Highest education level: Not on file   Tobacco Use   • Smoking status: Never Smoker   • Smokeless tobacco: Never Used   Substance and Sexual Activity   • Alcohol use: No   • Drug use: No   • Sexual activity: Defer     Past Surgical History:   Procedure Laterality Date   • COLONOSCOPY      Colon Polyp Removal    • FINGER SURGERY Right     tumor removed from a finger     Review of Systems   Constitution: Negative for chills, diaphoresis, fatigue, fever, weakness, malaise/fatigue, weight gain and weight loss.   HENT: Negative for congestion.    Cardiovascular: Negative for chest pain, dyspnea on exertion, leg swelling, near-syncope, orthopnea, palpitations, paroxysmal nocturnal dyspnea and syncope.   Respiratory: Negative for cough, shortness of breath and wheezing.    Hematologic/Lymphatic: Negative for bleeding problem.   Skin: Negative for dry skin, flushing  "and itching.   Musculoskeletal: Negative for falls.   Gastrointestinal: Positive for anorexia. Negative for bloating, abdominal pain, nausea and vomiting.   Neurological: Negative for dizziness, light-headedness and loss of balance.   Psychiatric/Behavioral: Negative for altered mental status. The patient is not nervous/anxious.    All other systems reviewed and are negative.    Procedures  /60   Pulse 81   Ht 180.3 cm (71\")   Wt 49.9 kg (110 lb)   SpO2 98%   BMI 15.34 kg/m²        Objective:     Physical Exam   Constitutional: He is oriented to person, place, and time. Vital signs are normal. He appears well-developed. He appears cachectic. He is cooperative. No distress.   HENT:   Head: Normocephalic and atraumatic.   Mouth/Throat: No oropharyngeal exudate.   Eyes: Conjunctivae are normal. Right eye exhibits no discharge. Left eye exhibits no discharge.   Neck: Normal range of motion. Neck supple.   Cardiovascular: Normal rate and regular rhythm. Exam reveals no gallop and no friction rub.   Murmur heard.  Pulmonary/Chest: Effort normal and breath sounds normal. No respiratory distress. He has no wheezes. He has no rhonchi. He has no rales.   Abdominal: Soft. Normal appearance. He exhibits no distension. There is no tenderness.   Musculoskeletal: Normal range of motion. He exhibits no edema.   Neurological: He is alert and oriented to person, place, and time.   Skin: Skin is warm, dry and intact. No rash noted. He is not diaphoretic. No erythema. No pallor.   Psychiatric: He has a normal mood and affect. His speech is normal and behavior is normal. His mood appears not anxious. His affect is not angry. He does not exhibit a depressed mood.   Vitals reviewed.      Lab Review:   Echo  Interpretation Summary   04.12.2018  · Left atrial cavity size is moderately dilated.  · There is moderate prolapse of the posterior leaflet present.  · Severe mitral valve regurgitation is present  · Mild tricuspid valve " regurgitation is present.         Assessment:          Diagnosis Plan   1. Non-rheumatic mitral regurgitation     2. Mitral valve prolapse     3. Essential hypertension     4. Benign non-nodular prostatic hyperplasia without lower urinary tract symptoms            Plan:       - MVP/MR: severe per echo, without symptoms. He continues to be able to do his daily activities without complaint. Monitor symptoms.    - HTN: well controlled, off ace inhibitor due to need for apha blocker and alpha reductase inhibitors for prostate health.    - BPH: managed per urology     - Underweight: BMI 15.     Follow up in ~ 3 months for routine follow up of symptoms. Sooner if needed.

## 2019-09-23 NOTE — PATIENT INSTRUCTIONS

## 2019-09-24 ENCOUNTER — EPISODE CHANGES (OUTPATIENT)
Dept: CASE MANAGEMENT | Facility: OTHER | Age: 84
End: 2019-09-24

## 2019-10-18 ENCOUNTER — PROCEDURE VISIT (OUTPATIENT)
Dept: UROLOGY | Facility: CLINIC | Age: 84
End: 2019-10-18

## 2019-10-18 DIAGNOSIS — N40.1 BENIGN PROSTATIC HYPERPLASIA WITH URINARY OBSTRUCTION: ICD-10-CM

## 2019-10-18 DIAGNOSIS — R33.9 RETENTION, URINE: Primary | ICD-10-CM

## 2019-10-18 DIAGNOSIS — N13.8 BENIGN PROSTATIC HYPERPLASIA WITH URINARY OBSTRUCTION: ICD-10-CM

## 2019-10-18 PROCEDURE — 52000 CYSTOURETHROSCOPY: CPT | Performed by: UROLOGY

## 2019-10-18 NOTE — PROGRESS NOTES
Pre- operative diagnosis:  Urinary retention    Post operative diagnosis:  BPH    Procedure:  The patient was prepped and draped in a normal sterile fashion.  The urethra was anesthetized with 2% lidocaine jelly.  A flexible cystoscope was introduced per urethra.      Urethra:  Normal    Bladder:  heavy trabeculation, + diverticuli    Ureteral orifices:  Unable to identify    Prostate:  Massive enlargement of prostate with large median lobe extending to posterior wall of bladder    Patient tolerated the procedure well    Complications: none    Blood loss: minimal    Follow up:    Increase Flomax to 0.8  Voiding Trial in 1-2 weeks    I had a long discussion with Mr. Kenney today he has a massively enlarged prostate.  On CT appears 8 x 7 cm.  He appears to not be a very adequate surgical candidate.  I am concerned that this prostate is very large and too large to be managed transurethrally.  I do not think he is a great candidate for robotic simple prostatectomy either.  May increase his Flomax and see if he can urinate and try to treat him as conservatively as possible.

## 2019-10-24 NOTE — PROGRESS NOTES
Mr. Kenney is 84 y.o. male    Chief Complaint   Patient presents with   • Urinary Retention     Fill and Pull       History of Present Illness   Patient presents for voiding trial after episode of urinary retention.  On October 18, he had a cystoscopy by Dr. Cha which revealed a significantly enlarged prostate.  He denies any fevers, chills, nausea, vomiting, hematuria.  His Flomax was increased to 0.8 mg at that appointment.    The following portions of the patient's history were reviewed and updated as appropriate: allergies, current medications, past family history, past medical history, past social history, past surgical history and problem list.    Review of Systems   Constitutional: Negative.  Negative for chills and fever.   Gastrointestinal: Negative for abdominal distention, abdominal pain, blood in stool, nausea and vomiting.   Genitourinary: Positive for difficulty urinating. Negative for dysuria, flank pain, frequency, hematuria and urgency.   Psychiatric/Behavioral: Negative.  Negative for agitation and confusion.         Current Outpatient Medications:   •  acyclovir (ZOVIRAX) 800 MG tablet, TK 1 T PO  EVERY 4 HOURS, Disp: , Rfl: 0  •  finasteride (PROSCAR) 5 MG tablet, Take 1 tablet by mouth Daily., Disp: 90 tablet, Rfl: 3  •  HYDROcodone-acetaminophen (NORCO) 5-325 MG per tablet, TK 1 T PO  EVERY 4 HOURS AS NEEDED FOR MODERATE PAIN, Disp: , Rfl: 0  •  sulfamethoxazole-trimethoprim (BACTRIM DS,SEPTRA DS) 800-160 MG per tablet, Take 1 tablet by mouth 2 (Two) Times a Day., Disp: 20 tablet, Rfl: 0  •  tamsulosin (FLOMAX) 0.4 MG capsule 24 hr capsule, Take 1 capsule by mouth Daily., Disp: 90 capsule, Rfl: 3    Past Medical History:   Diagnosis Date   • BPH (benign prostatic hypertrophy)    • Epididymitis    • Hypertension    • Hypertension, essential    • Mitral valve regurgitation    • Undiagnosed cardiac murmurs    • Weight loss        Past Surgical History:   Procedure Laterality Date   •  "COLONOSCOPY      Colon Polyp Removal    • FINGER SURGERY Right     tumor removed from a finger       Social History     Socioeconomic History   • Marital status:      Spouse name: Not on file   • Number of children: Not on file   • Years of education: Not on file   • Highest education level: Not on file   Tobacco Use   • Smoking status: Never Smoker   • Smokeless tobacco: Never Used   Substance and Sexual Activity   • Alcohol use: No   • Drug use: No   • Sexual activity: Defer       Family History   Problem Relation Age of Onset   • No Known Problems Mother    • Cancer Father    • Cancer Sister    • No Known Problems Brother    • No Known Problems Sister    • No Known Problems Sister    • No Known Problems Sister        Objective    Temp 98.6 °F (37 °C)   Resp 18   Ht 180.3 cm (71\")   Wt 49.9 kg (110 lb)   BMI 15.34 kg/m²     Physical Exam   Constitutional: He is oriented to person, place, and time. He appears well-developed and well-nourished.   HENT:   Head: Normocephalic.   Pulmonary/Chest: Effort normal. No respiratory distress.   Abdominal: He exhibits no distension.   Musculoskeletal: He exhibits no edema.   Neurological: He is alert and oriented to person, place, and time.   Skin: Skin is warm and dry.   Psychiatric: He has a normal mood and affect. His behavior is normal.   Vitals reviewed.    Patient's Body mass index is 15.34 kg/m². BMI is below normal parameters. Recommendations include: none (medical contraindication).      Admission on 09/18/2019, Discharged on 09/18/2019   Component Date Value Ref Range Status   • Color,  09/18/2019 Orange* Yellow, Straw Final   • Appearance,  09/18/2019 Clear  Clear Final   • pH, UA 09/18/2019 6.5  5.0 - 8.0 Final   • Specific Gravity, UA 09/18/2019 1.006  1.005 - 1.030 Final   • Glucose, UA 09/18/2019 Negative  Negative Final   • Ketones, UA 09/18/2019 Negative  Negative Final   • Bilirubin, UA 09/18/2019 Negative  Negative Final   • Blood, UA " 09/18/2019 Large (3+)* Negative Final   • Protein, UA 09/18/2019 100 mg/dL (2+)* Negative Final   • Leuk Esterase, UA 09/18/2019 Small (1+)* Negative Final   • Nitrite, UA 09/18/2019 Negative  Negative Final   • Urobilinogen, UA 09/18/2019 0.2 E.U./dL  0.2 - 1.0 E.U./dL Final   • RBC, UA 09/18/2019 6-12* None Seen /HPF Final   • WBC, UA 09/18/2019 3-5* None Seen /HPF Final   • Bacteria, UA 09/18/2019 1+* None Seen /HPF Final   • Squamous Epithelial Cells, UA 09/18/2019 3-6* None Seen, 0-2 /HPF Final   • Hyaline Casts, UA 09/18/2019 0-2  None Seen /LPF Final   • Methodology 09/18/2019 Automated Microscopy   Final       Results for orders placed or performed during the hospital encounter of 09/18/19   Urinalysis With Culture If Indicated - Urine, Catheter   Result Value Ref Range    Color, UA Orange (A) Yellow, Straw    Appearance, UA Clear Clear    pH, UA 6.5 5.0 - 8.0    Specific Gravity, UA 1.006 1.005 - 1.030    Glucose, UA Negative Negative    Ketones, UA Negative Negative    Bilirubin, UA Negative Negative    Blood, UA Large (3+) (A) Negative    Protein,  mg/dL (2+) (A) Negative    Leuk Esterase, UA Small (1+) (A) Negative    Nitrite, UA Negative Negative    Urobilinogen, UA 0.2 E.U./dL 0.2 - 1.0 E.U./dL   Urinalysis, Microscopic Only - Urine, Catheter   Result Value Ref Range    RBC, UA 6-12 (A) None Seen /HPF    WBC, UA 3-5 (A) None Seen /HPF    Bacteria, UA 1+ (A) None Seen /HPF    Squamous Epithelial Cells, UA 3-6 (A) None Seen, 0-2 /HPF    Hyaline Casts, UA 0-2 None Seen /LPF    Methodology Automated Microscopy       Bladder Scan interpretation  Estimation of residual urine via abdominal ultrasound  Residual Urine: 467 ml  Indication: Rentention  Position: Supine  Examination: Incremental scanning of the suprapubic area using 3 MHz transducer using copious amounts of acoustic gel.   Findings: An anechoic area was demonstrated which represented the bladder, with measurement of residual urine as noted.  I, Sydnie, inspected this myself and will replace the gerardo catheter prior to discharge.    Assessment/Plan   Assessment and Plan    Roman was seen today for urinary retention.    Diagnoses and all orders for this visit:    Retention, urine  -     POC Urinalysis Dipstick, Multipro      Patient presents for voiding trial after episode of urinary retention and failed voiding trial.  Cystoscopic evaluation by Dr. Cha reveals a significantly enlarged prostate.  The patient is not a good surgical candidate and will require conservative management if possible.  Patient did fail his voiding trial today and Gerardo catheter was replaced.  He is currently taking the maximum dose of Flomax and is on finasteride.  I will confer with Dr. Cha about further treatment and evaluation options. For now, he will follow up in 1 with a voiding trial.

## 2019-10-25 ENCOUNTER — OFFICE VISIT (OUTPATIENT)
Dept: UROLOGY | Facility: CLINIC | Age: 84
End: 2019-10-25

## 2019-10-25 VITALS — BODY MASS INDEX: 15.4 KG/M2 | TEMPERATURE: 98.6 F | RESPIRATION RATE: 18 BRPM | WEIGHT: 110 LBS | HEIGHT: 71 IN

## 2019-10-25 DIAGNOSIS — R33.9 RETENTION, URINE: Primary | ICD-10-CM

## 2019-10-25 PROCEDURE — 99213 OFFICE O/P EST LOW 20 MIN: CPT | Performed by: NURSE PRACTITIONER

## 2019-10-25 PROCEDURE — 51798 US URINE CAPACITY MEASURE: CPT | Performed by: NURSE PRACTITIONER

## 2019-10-29 ENCOUNTER — TELEPHONE (OUTPATIENT)
Dept: UROLOGY | Facility: CLINIC | Age: 84
End: 2019-10-29

## 2019-10-29 NOTE — TELEPHONE ENCOUNTER
----- Message from HERACLIO Vance sent at 10/29/2019 11:05 AM CDT -----  He will likely need a permanent catheter. Can we call him and give him the option to do one more voiding trial (which actually needs to be scheduled on a nurse visit for Thursday or at least early Friday morning so he won't be coming back in the afternoon) or just schedule a monthly catheter change.  If he fails this time, permanent gerardo will be his only option.

## 2019-11-01 ENCOUNTER — PROCEDURE VISIT (OUTPATIENT)
Dept: UROLOGY | Facility: CLINIC | Age: 84
End: 2019-11-01

## 2019-11-01 ENCOUNTER — TELEPHONE (OUTPATIENT)
Dept: UROLOGY | Facility: CLINIC | Age: 84
End: 2019-11-01

## 2019-11-01 VITALS — HEIGHT: 71 IN | BODY MASS INDEX: 15.4 KG/M2 | RESPIRATION RATE: 16 BRPM | WEIGHT: 110.01 LBS

## 2019-11-01 DIAGNOSIS — N40.1 BENIGN PROSTATIC HYPERPLASIA WITH URINARY OBSTRUCTION: ICD-10-CM

## 2019-11-01 DIAGNOSIS — R33.9 RETENTION, URINE: Primary | ICD-10-CM

## 2019-11-01 DIAGNOSIS — N13.8 BENIGN PROSTATIC HYPERPLASIA WITH URINARY OBSTRUCTION: ICD-10-CM

## 2019-11-01 PROCEDURE — 99213 OFFICE O/P EST LOW 20 MIN: CPT | Performed by: NURSE PRACTITIONER

## 2019-11-01 NOTE — PROGRESS NOTES
Mr. Kenney is 84 y.o. male    Chief Complaint   Patient presents with   • Urinary Retention     Fill and pull       History of Present Illness   Patient presents for third voiding trial in office.  He was previously evaluated with cystoscopy on October 18.  He denies any fevers, chills, nausea, vomiting, hematuria, flank pain, abdominal pain.  He denies any aggravating or alleviating factors.    The following portions of the patient's history were reviewed and updated as appropriate: allergies, current medications, past family history, past medical history, past social history, past surgical history and problem list.    Review of Systems   Constitutional: Negative for chills and fever.   Gastrointestinal: Negative for abdominal distention, abdominal pain, nausea and vomiting.   Genitourinary: Positive for difficulty urinating. Negative for flank pain, frequency, hematuria and urgency.       Current Outpatient Medications:   •  acyclovir (ZOVIRAX) 800 MG tablet, TK 1 T PO  EVERY 4 HOURS, Disp: , Rfl: 0  •  finasteride (PROSCAR) 5 MG tablet, Take 1 tablet by mouth Daily., Disp: 90 tablet, Rfl: 3  •  HYDROcodone-acetaminophen (NORCO) 5-325 MG per tablet, TK 1 T PO  EVERY 4 HOURS AS NEEDED FOR MODERATE PAIN, Disp: , Rfl: 0  •  sulfamethoxazole-trimethoprim (BACTRIM DS,SEPTRA DS) 800-160 MG per tablet, Take 1 tablet by mouth 2 (Two) Times a Day., Disp: 20 tablet, Rfl: 0  •  tamsulosin (FLOMAX) 0.4 MG capsule 24 hr capsule, Take 1 capsule by mouth Daily., Disp: 90 capsule, Rfl: 3    Past Medical History:   Diagnosis Date   • BPH (benign prostatic hypertrophy)    • Epididymitis    • Hypertension    • Hypertension, essential    • Mitral valve regurgitation    • Undiagnosed cardiac murmurs    • Weight loss        Past Surgical History:   Procedure Laterality Date   • COLONOSCOPY      Colon Polyp Removal    • FINGER SURGERY Right     tumor removed from a finger       Social History     Socioeconomic History   • Marital  "status:      Spouse name: Not on file   • Number of children: Not on file   • Years of education: Not on file   • Highest education level: Not on file   Tobacco Use   • Smoking status: Never Smoker   • Smokeless tobacco: Never Used   Substance and Sexual Activity   • Alcohol use: No   • Drug use: No   • Sexual activity: Defer       Family History   Problem Relation Age of Onset   • No Known Problems Mother    • Cancer Father    • Cancer Sister    • No Known Problems Brother    • No Known Problems Sister    • No Known Problems Sister    • No Known Problems Sister        Objective    Resp 16   Ht 180.3 cm (70.98\")   Wt 49.9 kg (110 lb 0.2 oz)   BMI 15.35 kg/m²     Physical Exam   Constitutional: He is oriented to person, place, and time. He appears well-developed and well-nourished.   HENT:   Head: Normocephalic.   Pulmonary/Chest: Effort normal. No respiratory distress.   Abdominal: He exhibits no distension.   Musculoskeletal: He exhibits no edema.   Neurological: He is alert and oriented to person, place, and time.   Skin: Skin is warm and dry.   Psychiatric: He has a normal mood and affect. His behavior is normal.   Vitals reviewed.    Patient's Body mass index is 15.35 kg/m². BMI is below normal parameters. Recommendations include: none.      Admission on 09/18/2019, Discharged on 09/18/2019   Component Date Value Ref Range Status   • Color, UA 09/18/2019 Orange* Yellow, Straw Final   • Appearance, UA 09/18/2019 Clear  Clear Final   • pH, UA 09/18/2019 6.5  5.0 - 8.0 Final   • Specific Gravity, UA 09/18/2019 1.006  1.005 - 1.030 Final   • Glucose, UA 09/18/2019 Negative  Negative Final   • Ketones, UA 09/18/2019 Negative  Negative Final   • Bilirubin, UA 09/18/2019 Negative  Negative Final   • Blood, UA 09/18/2019 Large (3+)* Negative Final   • Protein, UA 09/18/2019 100 mg/dL (2+)* Negative Final   • Leuk Esterase, UA 09/18/2019 Small (1+)* Negative Final   • Nitrite, UA 09/18/2019 Negative  Negative " Final   • Urobilinogen, UA 09/18/2019 0.2 E.U./dL  0.2 - 1.0 E.U./dL Final   • RBC, UA 09/18/2019 6-12* None Seen /HPF Final   • WBC, UA 09/18/2019 3-5* None Seen /HPF Final   • Bacteria, UA 09/18/2019 1+* None Seen /HPF Final   • Squamous Epithelial Cells, UA 09/18/2019 3-6* None Seen, 0-2 /HPF Final   • Hyaline Casts, UA 09/18/2019 0-2  None Seen /LPF Final   • Methodology 09/18/2019 Automated Microscopy   Final       Results for orders placed or performed during the hospital encounter of 09/18/19   Urinalysis With Culture If Indicated - Urine, Catheter   Result Value Ref Range    Color, UA Orange (A) Yellow, Straw    Appearance, UA Clear Clear    pH, UA 6.5 5.0 - 8.0    Specific Gravity, UA 1.006 1.005 - 1.030    Glucose, UA Negative Negative    Ketones, UA Negative Negative    Bilirubin, UA Negative Negative    Blood, UA Large (3+) (A) Negative    Protein,  mg/dL (2+) (A) Negative    Leuk Esterase, UA Small (1+) (A) Negative    Nitrite, UA Negative Negative    Urobilinogen, UA 0.2 E.U./dL 0.2 - 1.0 E.U./dL   Urinalysis, Microscopic Only - Urine, Catheter   Result Value Ref Range    RBC, UA 6-12 (A) None Seen /HPF    WBC, UA 3-5 (A) None Seen /HPF    Bacteria, UA 1+ (A) None Seen /HPF    Squamous Epithelial Cells, UA 3-6 (A) None Seen, 0-2 /HPF    Hyaline Casts, UA 0-2 None Seen /LPF    Methodology Automated Microscopy       Assessment/Plan   Assessment and Plan    Roman was seen today for urinary retention.    Diagnoses and all orders for this visit:    Retention, urine    Benign prostatic hyperplasia with urinary obstruction      Patient presents for third voiding trial in office.  Cystoscopic evaluation by Dr. Cha revealed a significantly enlarged prostate.  As the patient is not a good surgical candidate, he will require conservative management.  Patient did fail his third voiding trial in office today.  I discussed with he and his wife the possibility for clean intermittent catheterization versus  permanent indwelling Rivera.  The patient and his wife have opted for the indwelling Rivera, as they do not believe they will be able to perform CIC at home.  Patient is already being seen by [facility].  He would like them to perform his monthly catheter changes.  We will provide them with orders for this procedure.    Using the catheter in place and sterile water, 330cc was installed into the bladder with no complications. Patient was unable to void. Using sterile technique a new 16fr catheter was placed, balloon inflated using 10cc sterile water,  300cc of urine drained from patients bladder via catheter then catheter was connected to leg bag.  Patient tolerated the procedure well.

## 2019-11-01 NOTE — TELEPHONE ENCOUNTER
----- Message from HERACLIO Vance sent at 11/1/2019 12:18 PM CDT -----  Can we please send orders to home health for monthly catheter changes

## 2019-11-01 NOTE — TELEPHONE ENCOUNTER
Faxed order for monthly catheter changes with 18 [place] gerardo to [facility]. Stated he will not be due for next catheter until 12/1/2019 or PRN. [facility]

## 2019-11-09 DIAGNOSIS — N40.1 BENIGN PROSTATIC HYPERPLASIA WITH URINARY OBSTRUCTION: ICD-10-CM

## 2019-11-09 DIAGNOSIS — N13.8 BENIGN PROSTATIC HYPERPLASIA WITH URINARY OBSTRUCTION: ICD-10-CM

## 2019-11-11 RX ORDER — FINASTERIDE 5 MG/1
5 TABLET, FILM COATED ORAL DAILY
Qty: 90 TABLET | Refills: 0 | Status: SHIPPED | OUTPATIENT
Start: 2019-11-11 | End: 2020-02-07

## 2019-11-11 RX ORDER — TAMSULOSIN HYDROCHLORIDE 0.4 MG/1
1 CAPSULE ORAL DAILY
Qty: 90 CAPSULE | Refills: 0 | Status: SHIPPED | OUTPATIENT
Start: 2019-11-11 | End: 2020-02-07

## 2019-11-11 NOTE — PROGRESS NOTES
Subjective    Mr. Kenney is 84 y.o. male    Chief Complaint: Urinary retention    History of Present Illness     Benign Prostatic Hypertrophy  Patient complains of lower urinary tract symptoms. He reports frequency, incomplete emptying, intermittency, nocturia one time a night and weak stream. He denies straining and urgency. Patient states symptoms are of mild severity. Onset of symptoms was several years ago and was gradual in onset. His AUA Symptom Score is, 11/35.He reports a history of no complicating symptoms. He denies flank pain, gross hematuria, kidney stones and recurrent UTI.  Patient has tried Alpha blockers and 5 alpha reductase inhibitors without improvement.  Patient has failed multiple voiding trials and is not a good surgical candidate.      The following portions of the patient's history were reviewed and updated as appropriate: allergies, current medications, past family history, past medical history, past social history, past surgical history and problem list.    Review of Systems   Constitutional: Negative for chills and fever.   Gastrointestinal: Negative for abdominal pain, anal bleeding and blood in stool.   Genitourinary: Negative for dysuria, frequency, hematuria and urgency.         Current Outpatient Medications:   •  acyclovir (ZOVIRAX) 800 MG tablet, TK 1 T PO  EVERY 4 HOURS, Disp: , Rfl: 0  •  finasteride (PROSCAR) 5 MG tablet, TAKE 1 TABLET BY MOUTH DAILY, Disp: 90 tablet, Rfl: 0  •  HYDROcodone-acetaminophen (NORCO) 5-325 MG per tablet, TK 1 T PO  EVERY 4 HOURS AS NEEDED FOR MODERATE PAIN, Disp: , Rfl: 0  •  sulfamethoxazole-trimethoprim (BACTRIM DS,SEPTRA DS) 800-160 MG per tablet, Take 1 tablet by mouth 2 (Two) Times a Day., Disp: 20 tablet, Rfl: 0  •  tamsulosin (FLOMAX) 0.4 MG capsule 24 hr capsule, TAKE 1 CAPSULE BY MOUTH DAILY, Disp: 90 capsule, Rfl: 0    Past Medical History:   Diagnosis Date   • BPH (benign prostatic hypertrophy)    • Epididymitis    • Hypertension    •  "Hypertension, essential    • Mitral valve regurgitation    • Undiagnosed cardiac murmurs    • Weight loss        Past Surgical History:   Procedure Laterality Date   • COLONOSCOPY      Colon Polyp Removal    • FINGER SURGERY Right     tumor removed from a finger       Social History     Socioeconomic History   • Marital status:      Spouse name: Not on file   • Number of children: Not on file   • Years of education: Not on file   • Highest education level: Not on file   Tobacco Use   • Smoking status: Never Smoker   • Smokeless tobacco: Never Used   Substance and Sexual Activity   • Alcohol use: No   • Drug use: No   • Sexual activity: Defer       Family History   Problem Relation Age of Onset   • No Known Problems Mother    • Cancer Father    • Cancer Sister    • No Known Problems Brother    • No Known Problems Sister    • No Known Problems Sister    • No Known Problems Sister        Objective    Temp 97.2 °F (36.2 °C)   Ht 180.3 cm (71\")   Wt 52.2 kg (115 lb)   BMI 16.04 kg/m²     Physical Exam   Constitutional: He is oriented to person, place, and time. He appears well-developed and well-nourished. No distress.   Pulmonary/Chest: Effort normal.   Abdominal: Soft. He exhibits no distension and no mass. There is no tenderness. There is no rebound and no guarding. No hernia.   Neurological: He is alert and oriented to person, place, and time.   Skin: Skin is warm and dry. He is not diaphoretic.   Psychiatric: He has a normal mood and affect.   Vitals reviewed.          Results for orders placed or performed during the hospital encounter of 09/18/19   Urinalysis With Culture If Indicated - Urine, Catheter   Result Value Ref Range    Color, UA Orange (A) Yellow, Straw    Appearance, UA Clear Clear    pH, UA 6.5 5.0 - 8.0    Specific Gravity, UA 1.006 1.005 - 1.030    Glucose, UA Negative Negative    Ketones, UA Negative Negative    Bilirubin, UA Negative Negative    Blood, UA Large (3+) (A) Negative    " Protein,  mg/dL (2+) (A) Negative    Leuk Esterase, UA Small (1+) (A) Negative    Nitrite, UA Negative Negative    Urobilinogen, UA 0.2 E.U./dL 0.2 - 1.0 E.U./dL   Urinalysis, Microscopic Only - Urine, Catheter   Result Value Ref Range    RBC, UA 6-12 (A) None Seen /HPF    WBC, UA 3-5 (A) None Seen /HPF    Bacteria, UA 1+ (A) None Seen /HPF    Squamous Epithelial Cells, UA 3-6 (A) None Seen, 0-2 /HPF    Hyaline Casts, UA 0-2 None Seen /LPF    Methodology Automated Microscopy      Patient's Body mass index is 16.04 kg/m². BMI is within normal parameters. No follow-up required..    Assessment and Plan    Diagnoses and all orders for this visit:    Retention, urine  -     Cancel: POC Urinalysis Dipstick, Multipro    Benign prostatic hyperplasia with urinary obstruction    Renal cyst          Patient with a history of simple cysts.    He also has a history of BPH and is on dual therapy.  He has failed multiple voiding trials.  Cystoscopy shows a humongous prostate and on CT measuring 8 x 7 cm.  A large intravesical lobe.  Is not adequate surgical candidate.  They are not able to do CIC.  Also discussed the possibility of a suprapubic tube he does not want to go that route.  He will continue indwelling Rivera and will arrange this to be done through our office once a month.  Follow-up in 1 year with NP.

## 2019-11-13 ENCOUNTER — OFFICE VISIT (OUTPATIENT)
Dept: UROLOGY | Facility: CLINIC | Age: 84
End: 2019-11-13

## 2019-11-13 VITALS — BODY MASS INDEX: 16.1 KG/M2 | TEMPERATURE: 97.2 F | HEIGHT: 71 IN | WEIGHT: 115 LBS

## 2019-11-13 DIAGNOSIS — R33.9 RETENTION, URINE: Primary | ICD-10-CM

## 2019-11-13 DIAGNOSIS — N40.1 BENIGN PROSTATIC HYPERPLASIA WITH URINARY OBSTRUCTION: ICD-10-CM

## 2019-11-13 DIAGNOSIS — N13.8 BENIGN PROSTATIC HYPERPLASIA WITH URINARY OBSTRUCTION: ICD-10-CM

## 2019-11-13 DIAGNOSIS — N28.1 RENAL CYST: ICD-10-CM

## 2019-11-13 PROCEDURE — 99213 OFFICE O/P EST LOW 20 MIN: CPT | Performed by: UROLOGY

## 2019-11-14 ENCOUNTER — OFFICE VISIT (OUTPATIENT)
Dept: FAMILY MEDICINE CLINIC | Facility: CLINIC | Age: 84
End: 2019-11-14

## 2019-11-14 VITALS
BODY MASS INDEX: 15.96 KG/M2 | RESPIRATION RATE: 16 BRPM | SYSTOLIC BLOOD PRESSURE: 108 MMHG | HEIGHT: 71 IN | WEIGHT: 114 LBS | HEART RATE: 78 BPM | OXYGEN SATURATION: 97 % | DIASTOLIC BLOOD PRESSURE: 78 MMHG

## 2019-11-14 DIAGNOSIS — R33.9 URINARY RETENTION: Primary | ICD-10-CM

## 2019-11-14 DIAGNOSIS — N13.8 BPH WITH OBSTRUCTION/LOWER URINARY TRACT SYMPTOMS: ICD-10-CM

## 2019-11-14 DIAGNOSIS — N40.1 BPH WITH OBSTRUCTION/LOWER URINARY TRACT SYMPTOMS: ICD-10-CM

## 2019-11-14 PROCEDURE — 99213 OFFICE O/P EST LOW 20 MIN: CPT | Performed by: FAMILY MEDICINE

## 2019-11-14 NOTE — PROGRESS NOTES
"Subjective   Roman Kenney is a 84 y.o. male.     Chief Complaint   Patient presents with   • Follow-up     8 week  urinary retention       History of Present Illness     he is here today with his wfie---weating gerardo catheter---      Current Outpatient Medications:   •  acyclovir (ZOVIRAX) 800 MG tablet, TK 1 T PO  EVERY 4 HOURS, Disp: , Rfl: 0  •  finasteride (PROSCAR) 5 MG tablet, TAKE 1 TABLET BY MOUTH DAILY, Disp: 90 tablet, Rfl: 0  •  HYDROcodone-acetaminophen (NORCO) 5-325 MG per tablet, TK 1 T PO  EVERY 4 HOURS AS NEEDED FOR MODERATE PAIN, Disp: , Rfl: 0  •  sulfamethoxazole-trimethoprim (BACTRIM DS,SEPTRA DS) 800-160 MG per tablet, Take 1 tablet by mouth 2 (Two) Times a Day., Disp: 20 tablet, Rfl: 0  •  tamsulosin (FLOMAX) 0.4 MG capsule 24 hr capsule, TAKE 1 CAPSULE BY MOUTH DAILY, Disp: 90 capsule, Rfl: 0  No Known Allergies    Past Medical History:   Diagnosis Date   • BPH (benign prostatic hypertrophy)    • Epididymitis    • Hypertension    • Hypertension, essential    • Mitral valve regurgitation    • Undiagnosed cardiac murmurs    • Weight loss      Past Surgical History:   Procedure Laterality Date   • COLONOSCOPY      Colon Polyp Removal    • FINGER SURGERY Right     tumor removed from a finger       Review of Systems   Constitutional: Negative.    HENT: Negative.    Eyes: Negative.    Respiratory: Negative.    Cardiovascular: Negative.    Gastrointestinal: Negative.    Endocrine: Negative.    Genitourinary: Positive for difficulty urinating.   Musculoskeletal: Negative.    Allergic/Immunologic: Negative.    Neurological: Negative.    Hematological: Negative.    Psychiatric/Behavioral: Negative.        Objective  /78   Pulse 78   Resp 16   Ht 180.3 cm (71\")   Wt 51.7 kg (114 lb)   SpO2 97%   BMI 15.90 kg/m²   Physical Exam   Constitutional: He is oriented to person, place, and time. He appears well-developed and well-nourished.   HENT:   Head: Normocephalic and atraumatic.   Eyes: " EOM are normal. Pupils are equal, round, and reactive to light.   Neck: Normal range of motion. Neck supple.   Cardiovascular: Normal rate, regular rhythm and normal heart sounds.   Pulmonary/Chest: Effort normal and breath sounds normal.   Abdominal: Soft. Bowel sounds are normal.   Musculoskeletal: Normal range of motion.   Neurological: He is alert and oriented to person, place, and time.   Skin: Skin is warm. Capillary refill takes less than 2 seconds.   Psychiatric: He has a normal mood and affect. His behavior is normal. Judgment and thought content normal.   Nursing note and vitals reviewed.      Assessment/Plan   Roman was seen today for follow-up.    Diagnoses and all orders for this visit:    Urinary retention    BPH with obstruction/lower urinary tract symptoms                 No orders of the defined types were placed in this encounter.      Follow up: 6 month(s)

## 2019-11-18 ENCOUNTER — TELEPHONE (OUTPATIENT)
Dept: FAMILY MEDICINE CLINIC | Facility: CLINIC | Age: 84
End: 2019-11-18

## 2019-11-18 NOTE — TELEPHONE ENCOUNTER
Merlene with [facility] called to let you know the pt is being D/C'd today. He is doing well and has planned on getting his gerardo changed through Urology monthly. His next gerardo change will be on 12/12/19.

## 2019-12-03 ENCOUNTER — PROCEDURE VISIT (OUTPATIENT)
Dept: UROLOGY | Facility: CLINIC | Age: 84
End: 2019-12-03

## 2019-12-03 DIAGNOSIS — N40.1 BPH WITH OBSTRUCTION/LOWER URINARY TRACT SYMPTOMS: Primary | ICD-10-CM

## 2019-12-03 DIAGNOSIS — N13.8 BPH WITH OBSTRUCTION/LOWER URINARY TRACT SYMPTOMS: Primary | ICD-10-CM

## 2019-12-03 PROCEDURE — 99211 OFF/OP EST MAY X REQ PHY/QHP: CPT | Performed by: UROLOGY

## 2019-12-03 NOTE — PROGRESS NOTES
Roman Kenney is here today for catheter change.  Patient is seen by Dr. Cha.  The old catheter was removed without difficulty. Using sterile technique the patient was prepped with Hibiclens and new 16F catheter was placed. 10 cc of sterile water used to inflated the balloon and catheter was then attached to a leg bag. Patient tolerated the procedure well.  Dr. Teixeira was in the office at the time of procedure. The patient was advised to return in 1 month for next catheter change. Patient verbalized understanding.     Reviewed and agreed with above.

## 2020-01-03 ENCOUNTER — PROCEDURE VISIT (OUTPATIENT)
Dept: UROLOGY | Facility: CLINIC | Age: 85
End: 2020-01-03

## 2020-01-03 DIAGNOSIS — N13.8 BPH WITH OBSTRUCTION/LOWER URINARY TRACT SYMPTOMS: Primary | ICD-10-CM

## 2020-01-03 DIAGNOSIS — N40.1 BPH WITH OBSTRUCTION/LOWER URINARY TRACT SYMPTOMS: Primary | ICD-10-CM

## 2020-01-03 PROCEDURE — 99211 OFF/OP EST MAY X REQ PHY/QHP: CPT | Performed by: UROLOGY

## 2020-01-03 NOTE — PROGRESS NOTES
Roman Kenney is here today for catheter change.  Patient is seen by Dr. Cha.  The old catheter was removed without difficulty. Using sterile technique the patient was prepped with Hibiclens and new 16 [place] catheter was placed. 10 cc of sterile water used to inflated the balloon and catheter was then attached to a leg bag. Patient tolerated the procedure well.  Dr. Morgan was in the office at the time of procedure. The patient was advised to return in 1 month for next catheter change. Patient verbalized understanding.     Reviewed and agreed with above.

## 2020-01-23 ENCOUNTER — OFFICE VISIT (OUTPATIENT)
Dept: CARDIOLOGY | Facility: CLINIC | Age: 85
End: 2020-01-23

## 2020-01-23 VITALS
HEIGHT: 71 IN | WEIGHT: 124 LBS | DIASTOLIC BLOOD PRESSURE: 80 MMHG | HEART RATE: 76 BPM | RESPIRATION RATE: 18 BRPM | SYSTOLIC BLOOD PRESSURE: 130 MMHG | BODY MASS INDEX: 17.36 KG/M2 | OXYGEN SATURATION: 99 %

## 2020-01-23 DIAGNOSIS — I34.0 NON-RHEUMATIC MITRAL REGURGITATION: Primary | ICD-10-CM

## 2020-01-23 DIAGNOSIS — R63.6 PATIENT UNDERWEIGHT: ICD-10-CM

## 2020-01-23 DIAGNOSIS — I10 ESSENTIAL HYPERTENSION: ICD-10-CM

## 2020-01-23 PROCEDURE — 99214 OFFICE O/P EST MOD 30 MIN: CPT | Performed by: NURSE PRACTITIONER

## 2020-01-23 PROCEDURE — 93000 ELECTROCARDIOGRAM COMPLETE: CPT | Performed by: NURSE PRACTITIONER

## 2020-01-23 NOTE — PROGRESS NOTES
Subjective:     Encounter Date:01/23/2020      Patient ID: Roman Kenney is a 84 y.o. male.    Chief Complaint:  Mitral Valve Prolapse   This is a chronic problem. The current episode started more than 1 year ago. The problem has been unchanged. Pertinent negatives include no abdominal pain, chest pain, chills, coughing, fatigue, fever, headaches, nausea, rash or vomiting. Nothing aggravates the symptoms.   Hypertension   This is a chronic problem. The current episode started more than 1 year ago. The problem is controlled. Pertinent negatives include no chest pain, headaches, malaise/fatigue, orthopnea, palpitations, PND or shortness of breath. Past treatments include alpha 1 blockers. The current treatment provides significant improvement. There are no compliance problems.      Patient presents today for routine follow up for severe MR. Patient also has a history hypertension- but has been taken off of his medications he was previously on. Patient has no complaints at this time. He states he is active without limitations. Denies shortness of breath, chest pain, palpitations, syncope, lightheadedness or dizziness. He does report an occasional swelling on legs- that is unchanged- and reports a high salt intake. Follows with Dr. Hancock as his PCP. He does follow with Dr. Cha for prostate issues.   The following portions of the patient's history were reviewed and updated as appropriate: allergies, current medications, past family history, past medical history, past social history, past surgical history and problem list.   Prior to Admission medications    Medication Sig Start Date End Date Taking? Authorizing Provider   finasteride (PROSCAR) 5 MG tablet TAKE 1 TABLET BY MOUTH DAILY 11/11/19  Yes Joseph Cha MD   tamsulosin (FLOMAX) 0.4 MG capsule 24 hr capsule TAKE 1 CAPSULE BY MOUTH DAILY 11/11/19  Yes Joseph Cha MD     Review of Systems   Constitution: Negative for chills,  decreased appetite, fatigue, fever, malaise/fatigue, weight gain and weight loss.   HENT: Negative for nosebleeds.    Eyes: Negative for visual disturbance.   Cardiovascular: Negative for chest pain, dyspnea on exertion, leg swelling, near-syncope, orthopnea, palpitations, paroxysmal nocturnal dyspnea and syncope.   Respiratory: Negative for cough, hemoptysis, shortness of breath and snoring.    Endocrine: Negative for cold intolerance and heat intolerance.   Hematologic/Lymphatic: Negative for bleeding problem. Does not bruise/bleed easily.   Skin: Negative for rash.   Musculoskeletal: Negative for back pain and falls.   Gastrointestinal: Negative for abdominal pain, constipation, diarrhea, heartburn, melena, nausea and vomiting.   Genitourinary: Negative for hematuria.   Neurological: Negative for dizziness, headaches and light-headedness.   Psychiatric/Behavioral: Negative for altered mental status.   Allergic/Immunologic: Negative for persistent infections.         ECG 12 Lead  Date/Time: 1/23/2020 9:45 AM  Performed by: Mazin Scott APRN  Authorized by: Mazin Scott APRN   Comparison: compared with previous ECG from 5/21/2019  Similar to previous ECG  Rhythm: sinus rhythm  Other findings: left ventricular hypertrophy               Objective:     Physical Exam   Constitutional: He is oriented to person, place, and time. He appears well-developed and well-nourished.   HENT:   Head: Normocephalic and atraumatic.   Eyes: Pupils are equal, round, and reactive to light.   Neck: Normal range of motion. Neck supple. No JVD present. Carotid bruit is not present.   Cardiovascular: Normal rate, regular rhythm and intact distal pulses.   Murmur heard.  Pulmonary/Chest: Effort normal and breath sounds normal.   Abdominal: Soft. Bowel sounds are normal.   Musculoskeletal: Normal range of motion. He exhibits edema (trace bilateral edema).   Neurological: He is alert and oriented to person, place, and time. He has  "normal reflexes.   Skin: Skin is warm and dry.   Psychiatric: He has a normal mood and affect. His behavior is normal. Judgment and thought content normal.     Blood pressure 130/80, pulse 76, resp. rate 18, height 180.3 cm (71\"), weight 56.2 kg (124 lb), SpO2 99 %.      Lab Review:       Assessment:          Diagnosis Plan   1. Non-rheumatic mitral regurgitation  Adult Transthoracic Echo Complete W/ Cont if Necessary Per Protocol   2. Essential hypertension     3. Patient underweight            Plan:       1. Severe MR- last echo in April 2019. Will repeat in 3 months with office appointment to follow. Discussed in detail symptoms to monitor and call office for any acute worsening.   2. Blood pressure controlled off medications- on Alpha blocker for prostate  3. Underweight- discussed good nutritional choices. Avoid salt.          "

## 2020-02-06 ENCOUNTER — PROCEDURE VISIT (OUTPATIENT)
Dept: UROLOGY | Facility: CLINIC | Age: 85
End: 2020-02-06

## 2020-02-06 DIAGNOSIS — N40.1 BENIGN PROSTATIC HYPERPLASIA WITH URINARY OBSTRUCTION: ICD-10-CM

## 2020-02-06 DIAGNOSIS — R33.9 RETENTION, URINE: Primary | ICD-10-CM

## 2020-02-06 DIAGNOSIS — N13.8 BENIGN PROSTATIC HYPERPLASIA WITH URINARY OBSTRUCTION: ICD-10-CM

## 2020-02-06 PROCEDURE — 51702 INSERT TEMP BLADDER CATH: CPT | Performed by: UROLOGY

## 2020-02-06 NOTE — PROGRESS NOTES
Roman Kenney is here today for catheter change.  Patient is seen by Dr. Cha.  The old catheter was removed without difficulty. Using sterile technique the patient was prepped with Hibiclens and new 16 Fr catheter was placed. 10 cc of sterile water used to inflated the balloon and catheter was then attached to a leg bag. Patient tolerated the procedure well.  Dr. Morgan was in the office at the time of procedure. The patient was advised to return in 1 month for next catheter change. Patient verbalized understanding.     Reviewed and agreed with above.    Medical assistant documentation is reviewed.  Agree with management as above  Ponce Morgan MD  2/6/2020  10:30 AM

## 2020-02-07 RX ORDER — TAMSULOSIN HYDROCHLORIDE 0.4 MG/1
1 CAPSULE ORAL DAILY
Qty: 90 CAPSULE | Refills: 0 | Status: SHIPPED | OUTPATIENT
Start: 2020-02-07 | End: 2020-05-06

## 2020-02-07 RX ORDER — FINASTERIDE 5 MG/1
5 TABLET, FILM COATED ORAL DAILY
Qty: 90 TABLET | Refills: 0 | Status: SHIPPED | OUTPATIENT
Start: 2020-02-07 | End: 2020-05-06

## 2020-03-05 ENCOUNTER — PROCEDURE VISIT (OUTPATIENT)
Dept: UROLOGY | Facility: CLINIC | Age: 85
End: 2020-03-05

## 2020-03-05 DIAGNOSIS — R33.9 RETENTION, URINE: Primary | ICD-10-CM

## 2020-03-06 NOTE — PROGRESS NOTES
Roman Kenney is here today for catheter change.  Patient is seen by Dr. Cha.  The old catheter was removed without difficulty. Using sterile technique the patient was prepped with Hibiclens and new 16 Fr catheter was placed. 10 cc of sterile water used to inflated the balloon and catheter was then attached to a leg bag. Patient tolerated the procedure well.  Dr. Teixeira was in the office at the time of procedure. The patient was advised to return in 1 month for next catheter change. Patient verbalized understanding.   Medical assistant documentation is reviewed.  Agree with management as above  Joseph Teixeira MD  3/6/2020  6:21 PM

## 2020-04-01 ENCOUNTER — HOSPITAL ENCOUNTER (OUTPATIENT)
Dept: CARDIOLOGY | Facility: HOSPITAL | Age: 85
Discharge: HOME | End: 2020-04-01
Admitting: NURSE PRACTITIONER

## 2020-04-01 VITALS
DIASTOLIC BLOOD PRESSURE: 79 MMHG | SYSTOLIC BLOOD PRESSURE: 147 MMHG | WEIGHT: 124 LBS | HEIGHT: 71 IN | BODY MASS INDEX: 17.36 KG/M2

## 2020-04-01 DIAGNOSIS — I34.0 NON-RHEUMATIC MITRAL REGURGITATION: ICD-10-CM

## 2020-04-01 LAB
BH CV ECHO MEAS - AO MAX PG (FULL): 3.6 MMHG
BH CV ECHO MEAS - AO MAX PG: 7.1 MMHG
BH CV ECHO MEAS - AO MEAN PG (FULL): 1 MMHG
BH CV ECHO MEAS - AO MEAN PG: 3 MMHG
BH CV ECHO MEAS - AO ROOT AREA (BSA CORRECTED): 1.9
BH CV ECHO MEAS - AO ROOT AREA: 8.6 CM^2
BH CV ECHO MEAS - AO ROOT DIAM: 3.3 CM
BH CV ECHO MEAS - AO V2 MAX: 133 CM/SEC
BH CV ECHO MEAS - AO V2 MEAN: 83.8 CM/SEC
BH CV ECHO MEAS - AO V2 VTI: 21.5 CM
BH CV ECHO MEAS - AVA(I,A): 2 CM^2
BH CV ECHO MEAS - AVA(I,D): 2 CM^2
BH CV ECHO MEAS - AVA(V,A): 2.2 CM^2
BH CV ECHO MEAS - AVA(V,D): 2.2 CM^2
BH CV ECHO MEAS - BSA(HAYCOCK): 1.7 M^2
BH CV ECHO MEAS - BSA: 1.7 M^2
BH CV ECHO MEAS - BZI_BMI: 17.3 KILOGRAMS/M^2
BH CV ECHO MEAS - BZI_METRIC_HEIGHT: 180.3 CM
BH CV ECHO MEAS - BZI_METRIC_WEIGHT: 56.2 KG
BH CV ECHO MEAS - EDV(CUBED): 225.9 ML
BH CV ECHO MEAS - EDV(MOD-SP4): 120 ML
BH CV ECHO MEAS - EDV(TEICH): 186.2 ML
BH CV ECHO MEAS - EF(CUBED): 81.2 %
BH CV ECHO MEAS - EF(MOD-SP4): 57.9 %
BH CV ECHO MEAS - EF(TEICH): 72.9 %
BH CV ECHO MEAS - ESV(CUBED): 42.5 ML
BH CV ECHO MEAS - ESV(MOD-SP4): 50.5 ML
BH CV ECHO MEAS - ESV(TEICH): 50.5 ML
BH CV ECHO MEAS - FS: 42.7 %
BH CV ECHO MEAS - IVS/LVPW: 0.97
BH CV ECHO MEAS - IVSD: 0.94 CM
BH CV ECHO MEAS - LA DIMENSION: 4.9 CM
BH CV ECHO MEAS - LA/AO: 1.5
BH CV ECHO MEAS - LAT PEAK E' VEL: 10.3 CM/SEC
BH CV ECHO MEAS - LV DIASTOLIC VOL/BSA (35-75): 69.7 ML/M^2
BH CV ECHO MEAS - LV MASS(C)D: 237.9 GRAMS
BH CV ECHO MEAS - LV MASS(C)DI: 138.2 GRAMS/M^2
BH CV ECHO MEAS - LV MAX PG: 3.5 MMHG
BH CV ECHO MEAS - LV MEAN PG: 2 MMHG
BH CV ECHO MEAS - LV SYSTOLIC VOL/BSA (12-30): 29.3 ML/M^2
BH CV ECHO MEAS - LV V1 MAX: 93.1 CM/SEC
BH CV ECHO MEAS - LV V1 MEAN: 60.8 CM/SEC
BH CV ECHO MEAS - LV V1 VTI: 13.6 CM
BH CV ECHO MEAS - LVIDD: 6.1 CM
BH CV ECHO MEAS - LVIDS: 3.5 CM
BH CV ECHO MEAS - LVLD AP4: 7.9 CM
BH CV ECHO MEAS - LVLS AP4: 6.2 CM
BH CV ECHO MEAS - LVOT AREA (M): 3.1 CM^2
BH CV ECHO MEAS - LVOT AREA: 3.1 CM^2
BH CV ECHO MEAS - LVOT DIAM: 2 CM
BH CV ECHO MEAS - LVPWD: 0.97 CM
BH CV ECHO MEAS - MED PEAK E' VEL: 4.03 CM/SEC
BH CV ECHO MEAS - MR MAX PG: 110.4 MMHG
BH CV ECHO MEAS - MR MAX VEL: 525 CM/SEC
BH CV ECHO MEAS - MR MEAN PG: 77 MMHG
BH CV ECHO MEAS - MR MEAN VEL: 417 CM/SEC
BH CV ECHO MEAS - MR VTI: 149 CM
BH CV ECHO MEAS - MV A MAX VEL: 72.8 CM/SEC
BH CV ECHO MEAS - MV AREA (1 DIAM): 10.2 CM^2
BH CV ECHO MEAS - MV DEC TIME: 0.17 SEC
BH CV ECHO MEAS - MV DIAM: 3.6 CM
BH CV ECHO MEAS - MV E MAX VEL: 163 CM/SEC
BH CV ECHO MEAS - MV E/A: 2.2
BH CV ECHO MEAS - MV FLOW AREA(1DIAM): 10.2 CM^2
BH CV ECHO MEAS - PA MAX PG: 2.1 MMHG
BH CV ECHO MEAS - PA V2 MAX: 72.3 CM/SEC
BH CV ECHO MEAS - RAP SYSTOLE: 5 MMHG
BH CV ECHO MEAS - RVSP: 44.9 MMHG
BH CV ECHO MEAS - SI(AO): 106.8 ML/M^2
BH CV ECHO MEAS - SI(CUBED): 106.5 ML/M^2
BH CV ECHO MEAS - SI(LVOT): 24.8 ML/M^2
BH CV ECHO MEAS - SI(MOD-SP4): 40.4 ML/M^2
BH CV ECHO MEAS - SI(TEICH): 78.8 ML/M^2
BH CV ECHO MEAS - SV(AO): 183.9 ML
BH CV ECHO MEAS - SV(CUBED): 183.4 ML
BH CV ECHO MEAS - SV(LVOT): 42.7 ML
BH CV ECHO MEAS - SV(MOD-SP4): 69.5 ML
BH CV ECHO MEAS - SV(TEICH): 135.7 ML
BH CV ECHO MEAS - TR MAX VEL: 316 CM/SEC
BH CV ECHO MEASUREMENTS AVERAGE E/E' RATIO: 22.75
LEFT ATRIUM VOLUME INDEX: 63.4 ML/M2
LEFT ATRIUM VOLUME: 109 CM3
MAXIMAL PREDICTED HEART RATE: 135 BPM
STRESS TARGET HR: 115 BPM

## 2020-04-01 PROCEDURE — 93306 TTE W/DOPPLER COMPLETE: CPT | Performed by: INTERNAL MEDICINE

## 2020-04-01 PROCEDURE — 93306 TTE W/DOPPLER COMPLETE: CPT

## 2020-04-02 ENCOUNTER — PROCEDURE VISIT (OUTPATIENT)
Dept: UROLOGY | Facility: CLINIC | Age: 85
End: 2020-04-02

## 2020-04-02 DIAGNOSIS — N40.1 BPH WITH OBSTRUCTION/LOWER URINARY TRACT SYMPTOMS: ICD-10-CM

## 2020-04-02 DIAGNOSIS — N13.8 BPH WITH OBSTRUCTION/LOWER URINARY TRACT SYMPTOMS: ICD-10-CM

## 2020-04-02 PROCEDURE — 99024 POSTOP FOLLOW-UP VISIT: CPT | Performed by: UROLOGY

## 2020-04-03 DIAGNOSIS — I34.0 NON-RHEUMATIC MITRAL REGURGITATION: Primary | ICD-10-CM

## 2020-04-03 NOTE — PROGRESS NOTES
I discussed with patient results of echo. MR remains severe. EF now less than 60 and with pulmonary hypertension. Will refer patient to CTS. He reports he is asymptomatic. But agreeable to see CTS.

## 2020-05-06 DIAGNOSIS — N13.8 BENIGN PROSTATIC HYPERPLASIA WITH URINARY OBSTRUCTION: ICD-10-CM

## 2020-05-06 DIAGNOSIS — N40.1 BENIGN PROSTATIC HYPERPLASIA WITH URINARY OBSTRUCTION: ICD-10-CM

## 2020-05-06 RX ORDER — TAMSULOSIN HYDROCHLORIDE 0.4 MG/1
1 CAPSULE ORAL DAILY
Qty: 90 CAPSULE | Refills: 0 | Status: SHIPPED | OUTPATIENT
Start: 2020-05-06 | End: 2020-07-28

## 2020-05-06 RX ORDER — FINASTERIDE 5 MG/1
5 TABLET, FILM COATED ORAL DAILY
Qty: 90 TABLET | Refills: 0 | Status: SHIPPED | OUTPATIENT
Start: 2020-05-06 | End: 2020-08-03

## 2020-05-07 ENCOUNTER — PROCEDURE VISIT (OUTPATIENT)
Dept: UROLOGY | Facility: CLINIC | Age: 85
End: 2020-05-07

## 2020-05-07 DIAGNOSIS — R33.9 RETENTION, URINE: Primary | ICD-10-CM

## 2020-05-07 PROCEDURE — 51702 INSERT TEMP BLADDER CATH: CPT | Performed by: UROLOGY

## 2020-05-07 NOTE — PROGRESS NOTES
Roman Kenney is here today for catheter change.  Patient is seen by Dr. Cha.  The old catheter was removed without difficulty. Using sterile technique the patient was prepped with Hibiclens and new 16FR coude catheter was placed. 10 cc of sterile water used to inflated the balloon and catheter was then attached to a leg bag. Patient tolerated the procedure well.  Dr. Morgan was in the office at the time of procedure. The patient was advised to return in 1 month for next catheter change. Patient verbalized understanding.     Medical assistant documentation is reviewed.  Agree with management as above  Ponce Morgan MD  5/7/2020  18:53

## 2020-05-12 ENCOUNTER — OFFICE VISIT (OUTPATIENT)
Dept: CARDIOLOGY | Facility: CLINIC | Age: 85
End: 2020-05-12

## 2020-05-12 VITALS — HEIGHT: 71 IN | BODY MASS INDEX: 18.2 KG/M2 | WEIGHT: 130 LBS

## 2020-05-12 DIAGNOSIS — I34.1 MITRAL VALVE PROLAPSE: ICD-10-CM

## 2020-05-12 DIAGNOSIS — I34.0 NONRHEUMATIC MITRAL VALVE REGURGITATION: ICD-10-CM

## 2020-05-12 DIAGNOSIS — I10 ESSENTIAL HYPERTENSION: Primary | ICD-10-CM

## 2020-05-12 DIAGNOSIS — R63.6 PATIENT UNDERWEIGHT: ICD-10-CM

## 2020-05-12 PROCEDURE — 99441 PR PHYS/QHP TELEPHONE EVALUATION 5-10 MIN: CPT | Performed by: NURSE PRACTITIONER

## 2020-05-12 NOTE — PATIENT INSTRUCTIONS

## 2020-05-12 NOTE — PROGRESS NOTES
"    Subjective:     Encounter Date:05/12/2020      Patient ID: Roman Kenney is a 85 y.o. male.   HPI: His telephone visit today is for routine follow up. He has a history of severe mitral valve regurgitation, mitral valve prolapse and hypertension. He continues to deny chest pain, shortness of breath, palpitations, dizziness, syncope, orthopnea, PND, swelling or decreased stamina. He states that he is still able to do everything he wants to do, however he states \"I just have to watch myself, because of my age\". He denies any recent decline in stamina. He is scheduled to see Dr. Mirza for CT surgery evaluation 6/24/20.      Chief Complaint: routine follow up  Mitral Valve Prolapse   This is a chronic problem. The current episode started more than 1 year ago. The problem has been unchanged. Pertinent negatives include no abdominal pain, anorexia, arthralgias, change in bowel habit, chest pain, chills, congestion, coughing, diaphoresis, fatigue, fever, headaches, joint swelling, myalgias, nausea, neck pain, numbness, rash, sore throat, swollen glands, urinary symptoms, vertigo, visual change, vomiting or weakness.   Hypertension   This is a chronic problem. The current episode started more than 1 year ago. The problem is controlled. Pertinent negatives include no anxiety, blurred vision, chest pain, headaches, malaise/fatigue, neck pain, orthopnea, palpitations, peripheral edema, PND, shortness of breath or sweats. Risk factors for coronary artery disease include male gender. Current antihypertension treatment includes ACE inhibitors. The current treatment provides significant improvement.       The following portions of the patient's history were reviewed and updated as appropriate: allergies, current medications, past family history, past medical history, past social history, past surgical history and problem list.     No Known Allergies     Current Outpatient Medications:   •  finasteride (PROSCAR) 5 MG " tablet, TAKE 1 TABLET BY MOUTH DAILY, Disp: 90 tablet, Rfl: 0  •  tamsulosin (FLOMAX) 0.4 MG capsule 24 hr capsule, TAKE 1 CAPSULE BY MOUTH DAILY, Disp: 90 capsule, Rfl: 0  Past Medical History:   Diagnosis Date   • BPH (benign prostatic hypertrophy)    • Epididymitis    • Hypertension    • Hypertension, essential    • Mitral valve regurgitation    • Undiagnosed cardiac murmurs    • Weight loss      Past Surgical History:   Procedure Laterality Date   • COLONOSCOPY      Colon Polyp Removal    • FINGER SURGERY Right     tumor removed from a finger     Family History   Problem Relation Age of Onset   • No Known Problems Mother    • Cancer Father    • Cancer Sister    • No Known Problems Brother    • No Known Problems Sister    • No Known Problems Sister    • No Known Problems Sister      Social History     Socioeconomic History   • Marital status:      Spouse name: Not on file   • Number of children: Not on file   • Years of education: Not on file   • Highest education level: Not on file   Tobacco Use   • Smoking status: Never Smoker   • Smokeless tobacco: Never Used   Substance and Sexual Activity   • Alcohol use: No   • Drug use: No   • Sexual activity: Defer         Review of Systems   Constitution: Negative for chills, decreased appetite, diaphoresis, fatigue, fever, malaise/fatigue, night sweats, weight gain and weight loss.   HENT: Negative for congestion, nosebleeds and sore throat.    Eyes: Negative for blurred vision and visual disturbance.   Cardiovascular: Negative for chest pain, dyspnea on exertion, leg swelling, near-syncope, orthopnea, palpitations, paroxysmal nocturnal dyspnea and syncope.   Respiratory: Negative for cough, hemoptysis, shortness of breath, snoring and wheezing.    Endocrine: Negative for cold intolerance and heat intolerance.   Hematologic/Lymphatic: Does not bruise/bleed easily.   Skin: Negative for rash.   Musculoskeletal: Negative for arthralgias, back pain, falls, joint  swelling, myalgias and neck pain.   Gastrointestinal: Negative for abdominal pain, anorexia, change in bowel habit, constipation, diarrhea, dysphagia, heartburn, nausea and vomiting.   Genitourinary: Negative for hematuria.   Neurological: Negative for dizziness, headaches, light-headedness, numbness, vertigo and weakness.   Psychiatric/Behavioral: Negative for altered mental status.   Allergic/Immunologic: Negative for persistent infections.       Procedures  There were no vitals taken for this visit.       Objective:     Physical Exam      Assessment:          Diagnosis Plan   1. Essential hypertension  Well controlled.    2. Mitral valve prolapse     3. Non-rheumatic mitral regurgitation  Severe on echo 4/01/20. Continues to be asymptomatic. Pt scheduled to see Dr. Mirza for CT surgery evaluation 6/24/20.     4. Patient underweight  Patient's There is no height or weight on file to calculate BMI. BMI is below normal parameters. Recommendations include: educational materials.            Plan:       1. Continue medications as previously prescribed.  2. Report any worsening symptoms.  3. Report any signs of bleeding.  4. Continue heart healthy diet and regular exercise as tolerated.   5. Follow up with PCP for blood pressure and cholesterol management and routine lab work.  6. Follow up with mid-level provider in three months, or sooner if needed.   7. Reviewed signs and symptoms of CHF and what to report with the patient. Patient instructed to restrict sodium and weigh daily. Report weight gain of greater than 2 lbs overnight or 5 lbs in 1 week. Pt verbalized understanding of instructions and plan of care.     You have chosen to receive care through a telephone visit. Do you consent to use a telephone visit for your medical care today? Yes  This visit has been rescheduled as a phone visit to comply with patient safety concerns in accordance with CDC recommendations. Total time of discussion was 10  minutes.

## 2020-05-14 ENCOUNTER — OFFICE VISIT (OUTPATIENT)
Dept: FAMILY MEDICINE CLINIC | Facility: CLINIC | Age: 85
End: 2020-05-14

## 2020-05-14 VITALS — HEIGHT: 71 IN | WEIGHT: 135 LBS | BODY MASS INDEX: 18.9 KG/M2

## 2020-05-14 DIAGNOSIS — N13.8 BPH WITH OBSTRUCTION/LOWER URINARY TRACT SYMPTOMS: ICD-10-CM

## 2020-05-14 DIAGNOSIS — I34.1 MITRAL VALVE PROLAPSE: ICD-10-CM

## 2020-05-14 DIAGNOSIS — I34.0 NONRHEUMATIC MITRAL VALVE REGURGITATION: Primary | ICD-10-CM

## 2020-05-14 DIAGNOSIS — N40.1 BPH WITH OBSTRUCTION/LOWER URINARY TRACT SYMPTOMS: ICD-10-CM

## 2020-05-14 DIAGNOSIS — R33.9 URINARY RETENTION: ICD-10-CM

## 2020-05-14 PROCEDURE — 99442 PR PHYS/QHP TELEPHONE EVALUATION 11-20 MIN: CPT | Performed by: FAMILY MEDICINE

## 2020-05-14 NOTE — PROGRESS NOTES
"Subjective   Roman Kenney is a 85 y.o. male.   You have chosen to receive care through a telephone visit. Do you consent to use a telephone visit for your medical care today? Yes  Chief Complaint   Patient presents with   • Follow-up     6 mo  urinary retention        History of Present Illness     he is followed by cardiology for his \"heart valve\"---he is wearing cathterer for urinary retention  Followed by urology    Current Outpatient Medications:   •  finasteride (PROSCAR) 5 MG tablet, TAKE 1 TABLET BY MOUTH DAILY, Disp: 90 tablet, Rfl: 0  •  tamsulosin (FLOMAX) 0.4 MG capsule 24 hr capsule, TAKE 1 CAPSULE BY MOUTH DAILY, Disp: 90 capsule, Rfl: 0  No Known Allergies    Past Medical History:   Diagnosis Date   • BPH (benign prostatic hypertrophy)    • Epididymitis    • Hypertension    • Hypertension, essential    • Mitral valve regurgitation    • Undiagnosed cardiac murmurs    • Weight loss      Past Surgical History:   Procedure Laterality Date   • COLONOSCOPY      Colon Polyp Removal    • FINGER SURGERY Right     tumor removed from a finger       Review of Systems   Constitutional: Negative.    HENT: Negative.    Eyes: Negative.    Respiratory: Negative.    Cardiovascular: Negative.    Gastrointestinal: Negative.    Endocrine: Negative.    Genitourinary: Positive for difficulty urinating.   Musculoskeletal: Negative.    Skin: Negative.    Allergic/Immunologic: Negative.    Neurological: Negative.    Hematological: Negative.    Psychiatric/Behavioral: Negative.        Objective  Ht 180.3 cm (71\")   Wt 61.2 kg (135 lb)   BMI 18.83 kg/m²   Physical Exam    Assessment/Plan   Roman was seen today for follow-up.    Diagnoses and all orders for this visit:    Nonrheumatic mitral valve regurgitation    Mitral valve prolapse    BPH with obstruction/lower urinary tract symptoms    Urinary retention      This visit has been rescheduled as a phone visit to comply with patient safety concerns in accordance with " CDC recommendations. Total time of discussion was 17  minutes.  We spent time discussing his heatlh regarding his health , urination, and the covid 19.           No orders of the defined types were placed in this encounter.      Follow up: 6 month(s)

## 2020-05-24 ENCOUNTER — HOSPITAL ENCOUNTER (EMERGENCY)
Facility: HOSPITAL | Age: 85
Discharge: HOME | End: 2020-05-24
Attending: FAMILY MEDICINE | Admitting: FAMILY MEDICINE

## 2020-05-24 VITALS
RESPIRATION RATE: 18 BRPM | HEART RATE: 82 BPM | HEIGHT: 71 IN | DIASTOLIC BLOOD PRESSURE: 92 MMHG | SYSTOLIC BLOOD PRESSURE: 159 MMHG | OXYGEN SATURATION: 100 % | WEIGHT: 130 LBS | BODY MASS INDEX: 18.2 KG/M2 | TEMPERATURE: 98 F

## 2020-05-24 DIAGNOSIS — T83.9XXA COMPLICATION OF FOLEY CATHETER, INITIAL ENCOUNTER (HCC): Primary | ICD-10-CM

## 2020-05-24 PROCEDURE — 99283 EMERGENCY DEPT VISIT LOW MDM: CPT

## 2020-05-24 NOTE — ED PROVIDER NOTES
"Savanah Kenney is an 85-year-old gentleman who has a Rivera in place which he gets changed monthly.  The last time he had a change was the sixth of this month approximately 3 weeks ago.  He comes in today because he is concerned that it appears to be \"coming out\".  He denies any pain, dysuria, fever or other systemic symptoms.  He states that the Rivera appears to be draining okay but is just concerned that it appears to be more out than usual.          Review of Systems   All other systems reviewed and are negative.      Past Medical History:   Diagnosis Date   • BPH (benign prostatic hypertrophy)    • Epididymitis    • Hypertension    • Hypertension, essential    • Mitral valve regurgitation    • Undiagnosed cardiac murmurs    • Weight loss        No Known Allergies    Past Surgical History:   Procedure Laterality Date   • COLONOSCOPY      Colon Polyp Removal    • FINGER SURGERY Right     tumor removed from a finger       Family History   Problem Relation Age of Onset   • No Known Problems Mother    • Cancer Father    • Cancer Sister    • No Known Problems Brother    • No Known Problems Sister    • No Known Problems Sister    • No Known Problems Sister        Social History     Socioeconomic History   • Marital status:      Spouse name: Not on file   • Number of children: Not on file   • Years of education: Not on file   • Highest education level: Not on file   Tobacco Use   • Smoking status: Never Smoker   • Smokeless tobacco: Never Used   Substance and Sexual Activity   • Alcohol use: Never     Frequency: Never   • Drug use: Never   • Sexual activity: Defer           Objective   Physical Exam   Constitutional: He is oriented to person, place, and time. He appears well-developed and well-nourished.   HENT:   Head: Normocephalic and atraumatic.   Right Ear: External ear normal.   Left Ear: External ear normal.   Nose: Nose normal.   Mouth/Throat: Oropharynx is clear and moist.   Eyes: Conjunctivae " and EOM are normal.   Neck: Normal range of motion. Neck supple.   Cardiovascular: Normal rate, regular rhythm, normal heart sounds and intact distal pulses.   Pulmonary/Chest: Effort normal and breath sounds normal.   Abdominal: Soft. Bowel sounds are normal.   Musculoskeletal: Normal range of motion.   Neurological: He is alert and oriented to person, place, and time.   Skin: Skin is warm and dry. Capillary refill takes less than 2 seconds.   Psychiatric: He has a normal mood and affect. His behavior is normal. Judgment and thought content normal.   Nursing note and vitals reviewed.      Procedures           ED Course                                           MDM  Number of Diagnoses or Management Options  Patient Progress  Patient progress: stable      Final diagnoses:   Complication of Rivera catheter, initial encounter (CMS/[facility])     The patient's Rivera appears to be functional.  We did note that he was carrying it on his belt therefore the bag was above his bladder and perhaps that is why he thought it was not functioning properly.  He will follow-up with his urologist but understands that if anything changes he is welcome to come back to the emergency department.       Jonny Gonzalez MD  05/24/20 0757

## 2020-05-24 NOTE — ED NOTES
Catheter flushed with 30cc of normal saline without difficulty. Bladder scan showed 171ml of fluid. Dr Gonzalez made aware. Drainage bag put on the side of bed to let gravity flow. Pt educated that he cannot keep his bag on his belt loop because the bag has to use gravity to drain.      Viky Conklin, RN  05/24/20 0745

## 2020-05-26 ENCOUNTER — EPISODE CHANGES (OUTPATIENT)
Dept: CASE MANAGEMENT | Facility: OTHER | Age: 85
End: 2020-05-26

## 2020-05-26 ENCOUNTER — PATIENT OUTREACH (OUTPATIENT)
Dept: CASE MANAGEMENT | Facility: OTHER | Age: 85
End: 2020-05-26

## 2020-05-26 NOTE — OUTREACH NOTE
"Patient Outreach Note    Received PING notification of discharge from [facility] ED 5- for Rivera catheter complications. ACM outreach with patient's spouse. She stated they his Rivera is draining \"fine right now\". He will see \"doctor ishmael next week\". Rivera was changed at urology office on 5-4-2020. He has his medications in the home and is compliant;plenty of food in the home. He does not use an assist device for ambulation and spouse denies falls. Spouse is aware of future appointments with providers. Discussed care gaps with spouse. He did not receive a flu vaccine in 2019 and has not had a pneumonia vaccine. He completed his AWV 7-10-29. He is age exempt for colonoscopy. Spouse denied needs today and ACM proved her with contact information.     Crystal Cote RN  Ambulatory     5/26/2020, 11:19      "

## 2020-06-03 ENCOUNTER — EPISODE CHANGES (OUTPATIENT)
Dept: CASE MANAGEMENT | Facility: OTHER | Age: 85
End: 2020-06-03

## 2020-06-04 ENCOUNTER — PROCEDURE VISIT (OUTPATIENT)
Dept: UROLOGY | Facility: CLINIC | Age: 85
End: 2020-06-04

## 2020-06-04 DIAGNOSIS — R33.9 RETENTION, URINE: Primary | ICD-10-CM

## 2020-06-04 PROCEDURE — 51703 INSERT BLADDER CATH COMPLEX: CPT | Performed by: UROLOGY

## 2020-06-04 NOTE — PROGRESS NOTES
Patient is here today for catheter change.  Patient is seen by Dr. Cha.  The old catheter was removed without difficulty. Using sterile technique the patient was prepped with Betadine swabs x3.  2% Lidocaine Jelly was inserted into the urethra. 16F coude catheter was placed using sterile technique . 10 cc of sterile water used to inflated the balloon. Catheter was irrigated successfully with 60 cc syringe of sterile water. Catheter was then attached to a leg bag. Patient tolerated the procedure well.  Dr. Morgan was in the office at the time of procedure. The patient was advised to return in 1 month for next catheter change. Patient verbalized understanding.       Medical assistant documentation is reviewed.  Agree with management as above  Ponce Morgan MD  6/4/2020  13:15

## 2020-06-17 ENCOUNTER — OFFICE VISIT (OUTPATIENT)
Dept: CARDIAC SURGERY | Facility: CLINIC | Age: 85
End: 2020-06-17

## 2020-06-17 VITALS
DIASTOLIC BLOOD PRESSURE: 70 MMHG | SYSTOLIC BLOOD PRESSURE: 108 MMHG | HEART RATE: 88 BPM | BODY MASS INDEX: 17.19 KG/M2 | WEIGHT: 122.8 LBS | HEIGHT: 71 IN | OXYGEN SATURATION: 98 %

## 2020-06-17 DIAGNOSIS — I34.0 NONRHEUMATIC MITRAL VALVE REGURGITATION: Primary | ICD-10-CM

## 2020-06-17 DIAGNOSIS — R63.6 PATIENT UNDERWEIGHT: ICD-10-CM

## 2020-06-17 PROCEDURE — 99204 OFFICE O/P NEW MOD 45 MIN: CPT | Performed by: THORACIC SURGERY (CARDIOTHORACIC VASCULAR SURGERY)

## 2020-07-02 ENCOUNTER — PROCEDURE VISIT (OUTPATIENT)
Dept: UROLOGY | Facility: CLINIC | Age: 85
End: 2020-07-02

## 2020-07-02 DIAGNOSIS — R33.9 RETENTION, URINE: Primary | ICD-10-CM

## 2020-07-02 NOTE — PROGRESS NOTES
Roman Kenney is here today for catheter change.  Patient is seen by Dr. Cha.  The old catheter was removed without difficulty. Using sterile technique the patient was prepped with betadine and new 16FR coude catheter was placed. 10 cc of sterile water used to inflated the balloon and catheter was then attached to a overnight bag. Patient tolerated the procedure well.  Dr. Teixeira was in the office at the time of procedure. The patient was advised to return in 1 month for next catheter change. Patient verbalized understanding. Lissy Rice CMA.     Medical assistant documentation is reviewed.  Agree with management as above  Joseph Teixeira MD  7/2/2020  17:30

## 2020-07-06 NOTE — PROGRESS NOTES
Chief Complaint   Patient presents with   • Mitral Insufficiency     new patient from [facility]         Subjective     History of Present Illness    Mr. Kenney is an 85-year-old male with a history of BPH severe followed by Dr. Cha and followed by the heart group for mitral valve regurgitation with a known murmur presents to my office for the consideration of cardiac surgery.  He reports first and foremost that he does not want to have heart surgery.  That is too big of an operation for me.  He reports having some shortness of breath but really does not limit his activity levels.  He denies any admissions for heart failure or lower extremity edema.  In fact his greatest frustration right now is that he requires a Rivera catheter due to severe BPH.  Denies chest pain.    Review of Systems   Constitutional: Negative for activity change, appetite change, chills, diaphoresis, fatigue, fever and unexpected weight change.   HENT: Negative for dental problem, hearing loss, nosebleeds, sore throat, trouble swallowing and voice change.    Eyes: Negative for photophobia, redness and visual disturbance.   Respiratory: Positive for shortness of breath. Negative for apnea, cough, chest tightness, wheezing and stridor.    Cardiovascular: Negative for chest pain, palpitations and leg swelling.   Gastrointestinal: Negative for abdominal distention, abdominal pain, blood in stool, constipation, diarrhea, nausea and vomiting.   Endocrine: Negative for cold intolerance, heat intolerance, polyphagia and polyuria.   Genitourinary: Positive for difficulty urinating. Negative for decreased urine volume, dysuria, flank pain, frequency, hematuria and urgency.   Musculoskeletal: Positive for arthralgias. Negative for back pain, gait problem, joint swelling, myalgias and neck pain.   Skin: Negative for pallor, rash and wound.   Allergic/Immunologic: Negative for immunocompromised state.   Neurological: Positive for weakness. Negative  "for dizziness, tremors, seizures, syncope, speech difficulty, light-headedness, numbness and headaches.   Hematological: Does not bruise/bleed easily.   Psychiatric/Behavioral: Negative for confusion, sleep disturbance and suicidal ideas. The patient is not nervous/anxious.           Past Medical History:   Diagnosis Date   • BPH (benign prostatic hypertrophy)    • Epididymitis    • Hypertension    • Hypertension, essential    • Mitral valve regurgitation    • Undiagnosed cardiac murmurs    • Weight loss      Past Surgical History:   Procedure Laterality Date   • COLONOSCOPY      Colon Polyp Removal    • FINGER SURGERY Right     tumor removed from a finger     Family History   Problem Relation Age of Onset   • No Known Problems Mother    • Cancer Father    • Cancer Sister    • No Known Problems Brother    • No Known Problems Sister    • No Known Problems Sister    • No Known Problems Sister      Social History     Tobacco Use   • Smoking status: Never Smoker   • Smokeless tobacco: Never Used   Substance Use Topics   • Alcohol use: Never     Frequency: Never   • Drug use: Never       (Not in a hospital admission)  Allergies:  Patient has no known allergies.    Objective      Vital Signs  Visit Vitals  /70 (BP Location: Left arm, Patient Position: Sitting, Cuff Size: Adult)   Pulse 88   Ht 180.3 cm (71\")   Wt 55.7 kg (122 lb 12.8 oz)   SpO2 98%   BMI 17.13 kg/m²         Physical Exam   Constitutional: He is oriented to person, place, and time.   Mildly cachectic but animated   HENT:   Head: Normocephalic and atraumatic.   Mouth/Throat: Oropharynx is clear and moist.   Eyes: Pupils are equal, round, and reactive to light. EOM are normal.   Neck: Normal range of motion. Neck supple. No JVD present. No tracheal deviation present. No thyromegaly present.   Cardiovascular: Normal rate, regular rhythm and intact distal pulses. Exam reveals no gallop and no friction rub.   Murmur heard.  Pulmonary/Chest: Effort normal " and breath sounds normal. No respiratory distress. He has no wheezes. He has no rales. He exhibits no tenderness.   Abdominal: Soft. He exhibits no distension. There is no tenderness.   Musculoskeletal: Normal range of motion. He exhibits no edema.   Lymphadenopathy:     He has no cervical adenopathy.   Neurological: He is alert and oriented to person, place, and time. No cranial nerve deficit.   Skin: Skin is warm and dry.   Psychiatric: He has a normal mood and affect.       Results Review:      I reviewed the patient's new clinical results.  Discussed with patient and wife      Assessment/Plan   Roman was seen today for mitral insufficiency.    Diagnoses and all orders for this visit:    Nonrheumatic mitral valve regurgitation    Patient underweight      Discussed the patient's findings which is severe mitral valve regurgitation and my recommendations with the patient and his wife.  We discussed the options for mitral valve disease to include medical therapy, mitral valve surgery potentially repair possible replacement versus a percutaneous strategy.  We discussed to make a an opinion we would need to complete his work-up.  If he is thought to be high risk a percutaneous strategy could be offered.  That being said he is minimally symptomatic and has had no heart failure at this time.  He also strong does not want to have anything done on his heart at this time.  He would like to focus on his BPH and try to improve his quality of life.  We discussed that I be happy to reevaluate him a different date if he would like to reconsider the evaluation of cardiac surgery.  I have answered all questions the best my ability.  I did discuss broadly the overall conduct of care for cardiac surgery.  We discussed percutaneous strategies and how those are performed.  He was thankful for the information provided and at this time would like to continue with conservative medical therapy for his mitral valve regurgitation.    He is  a non-smoker.    Patient's Body mass index is 17.13 kg/m². BMI is below normal parameters. Recommendations include: referral to primary care.    Although I have not given him a specific return to clinic appointment, should I be of any further assistance in future, please do not hesitate to contact me.

## 2020-07-28 DIAGNOSIS — N40.1 BENIGN PROSTATIC HYPERPLASIA WITH URINARY OBSTRUCTION: ICD-10-CM

## 2020-07-28 DIAGNOSIS — N13.8 BENIGN PROSTATIC HYPERPLASIA WITH URINARY OBSTRUCTION: ICD-10-CM

## 2020-07-28 RX ORDER — TAMSULOSIN HYDROCHLORIDE 0.4 MG/1
1 CAPSULE ORAL DAILY
Qty: 90 CAPSULE | Refills: 0 | Status: SHIPPED | OUTPATIENT
Start: 2020-07-28 | End: 2020-09-24

## 2020-08-02 DIAGNOSIS — N13.8 BENIGN PROSTATIC HYPERPLASIA WITH URINARY OBSTRUCTION: ICD-10-CM

## 2020-08-02 DIAGNOSIS — N40.1 BENIGN PROSTATIC HYPERPLASIA WITH URINARY OBSTRUCTION: ICD-10-CM

## 2020-08-03 ENCOUNTER — PROCEDURE VISIT (OUTPATIENT)
Dept: UROLOGY | Facility: CLINIC | Age: 85
End: 2020-08-03

## 2020-08-03 DIAGNOSIS — R33.9 URINARY RETENTION: ICD-10-CM

## 2020-08-03 PROCEDURE — 99211 OFF/OP EST MAY X REQ PHY/QHP: CPT | Performed by: UROLOGY

## 2020-08-03 RX ORDER — FINASTERIDE 5 MG/1
5 TABLET, FILM COATED ORAL DAILY
Qty: 90 TABLET | Refills: 0 | Status: SHIPPED | OUTPATIENT
Start: 2020-08-03 | End: 2021-05-18

## 2020-08-03 NOTE — PROGRESS NOTES
Patient is here today to have an one month catheter change. Patient is seen by Dr. Cha. Patient denies and fever or N&V. Patient's old cath was removed using a sterile 10 cc syringe. The balloon was deflated and removed without difficulty. I then cleaned the area with 3 X's betadine swabs and using sterile technique I inserted the 16 coude catheter. The patient using a overnight bag and it was then attached. The patient was instructed to call with any problems and to schedule another one month catheter change. The patient verbalized understanding. Dr. Cha was in the office at the time of the procedure.     Rosalie Haider  Urology Medical Assistant     Reviewed and agree with above.

## 2020-08-13 NOTE — PROGRESS NOTES
Subjective:     Encounter Date:08/19/2020      Patient ID: Roman Kenney is a 85 y.o. male with a history of severe mitral regurgitation, mitral valve prolapse and HTN    Chief Complaint: follow up  Heart Murmur   This is a chronic problem. The current episode started more than 1 year ago. The problem occurs daily. The problem has been unchanged. Pertinent negatives include no chest pain, coughing, fatigue, rash or weakness.   Hypertension   This is a chronic problem. The current episode started more than 1 year ago. The problem has been rapidly improving since onset. The problem is controlled. Pertinent negatives include no chest pain, malaise/fatigue, orthopnea, palpitations, peripheral edema, PND or shortness of breath.     Patient presents today for a routine follow up. He was evaluated by Dr. Mirza in June for potential MVR and declined surgical intervention at that time due to his mild symptoms. He continues to report he has been doing well. He denies symptoms of HF. He reports he would like to get his urinary issues under control prior to proceeding with a mitral valve replacement.     The following portions of the patient's history were reviewed and updated as appropriate: allergies, current medications, past family history, past medical history, past social history, past surgical history and problem list.    No Known Allergies    Current Outpatient Medications:   •  finasteride (PROSCAR) 5 MG tablet, TAKE 1 TABLET BY MOUTH DAILY, Disp: 90 tablet, Rfl: 0  •  tamsulosin (FLOMAX) 0.4 MG capsule 24 hr capsule, TAKE 1 CAPSULE BY MOUTH DAILY, Disp: 90 capsule, Rfl: 0  Past Medical History:   Diagnosis Date   • BPH (benign prostatic hypertrophy)    • Epididymitis    • Hypertension    • Hypertension, essential    • Mitral valve regurgitation    • Undiagnosed cardiac murmurs    • Weight loss        Social History     Socioeconomic History   • Marital status:      Spouse name: Not on file   • Number  of children: Not on file   • Years of education: Not on file   • Highest education level: Not on file   Tobacco Use   • Smoking status: Never Smoker   • Smokeless tobacco: Never Used   Substance and Sexual Activity   • Alcohol use: Never     Frequency: Never   • Drug use: Never   • Sexual activity: Defer       Review of Systems   Constitution: Negative for fatigue, malaise/fatigue, weight gain and weight loss.   Cardiovascular: Negative for chest pain, dyspnea on exertion, irregular heartbeat, leg swelling, near-syncope, orthopnea, palpitations, paroxysmal nocturnal dyspnea and syncope.   Respiratory: Negative for cough, shortness of breath, sleep disturbances due to breathing, sputum production and wheezing.    Skin: Negative for dry skin, flushing, itching and rash.   Gastrointestinal: Negative for hematemesis and hematochezia.   Neurological: Negative for dizziness, light-headedness, loss of balance and weakness.   All other systems reviewed and are negative.         Objective:     Physical Exam   Constitutional: He is oriented to person, place, and time. He appears well-developed and well-nourished. No distress.   HENT:   Head: Normocephalic.   Mouth/Throat: No oropharyngeal exudate.   Eyes: Pupils are equal, round, and reactive to light. Conjunctivae and EOM are normal. No scleral icterus.   Neck: Normal range of motion. Neck supple.   Cardiovascular: Normal rate, regular rhythm and intact distal pulses.   Murmur heard.  High-pitched blowing holosystolic murmur is present with a grade of 4/6 at the apex.  Pulmonary/Chest: Effort normal and breath sounds normal. No respiratory distress. He has no wheezes. He has no rales. He exhibits no tenderness.   Abdominal: Soft. Bowel sounds are normal. He exhibits no distension. There is no tenderness.   Musculoskeletal: Normal range of motion. He exhibits no edema.   Neurological: He is alert and oriented to person, place, and time. He has normal reflexes.   Skin: Skin is  "warm and dry. No rash noted. He is not diaphoretic. No erythema. No pallor.   Psychiatric: He has a normal mood and affect. His behavior is normal.   Vitals reviewed.          ECG 12 Lead  Date/Time: 8/19/2020 1:03 PM  Performed by: Lissette Lizama APRN  Authorized by: Lissette Lizama APRN   Comparison: compared with previous ECG from 1/23/2020  Rhythm: sinus rhythm  Rate: normal  BPM: 77  T inversion: V1  QRS axis: normal    Clinical impression: abnormal EKG          /70   Pulse 77   Ht 180.3 cm (71\")   Wt 56.2 kg (124 lb)   SpO2 98%   BMI 17.29 kg/m²     Lab Review:   I have reviewed previous office notes, office notes from [facility] and recent labs.     Lab Results   Component Value Date    CHLPL 204 (H) 09/22/2016    TRIG 74 09/22/2016    HDL 69 09/22/2016     (H) 09/22/2016     Results for orders placed during the hospital encounter of 04/01/20   Adult Transthoracic Echo Complete W/ Cont if Necessary Per Protocol    Addendum · There is severe prolapse of the posterior mitral valve leaflet present,  resulting in severe, anteriorly directed mitral regurgitation. · Normal LVEF, though EF now <60%. · Left ventricular diastolic dysfunction (grade III) consistent with  reversible restrictive pattern. · Left atrial cavity size is severely dilated. · Normal size and function of right ventricle. · Estimated RVSP, by TR jet visualized (may not fully reflect true value)  is 35-45mmHg.        Jose Junior MD 4/1/2020  3:22 PM          Narrative · There is severe prolapse of the posterior mitral valve leaflet present,   resulting in severe, anteriorly directed mitral regurgitation.  · Normal LVEF, though EF now <60%.  · Left ventricular diastolic dysfunction (grade III) consistent with   reversible restrictive pattern.  · Left atrial cavity size is severely dilated.  · Normal size and function of right ventricle.  · Estimated RVSP, by TR jet visualized (may not fully reflect true value)   is 35-45mmHg.    "           Assessment:          Diagnosis Plan   1. Nonrheumatic mitral valve regurgitation     2. Mitral valve prolapse     3. Essential hypertension     4. Patient underweight            Plan:       1. Severe Mitral regurgitaiton- no issues with HF at this time. Patient declines surgical intervention. Will continue to monitor. Reviewed signs and symptoms of CHF and what to report with the patient. Patient instructed to restrict sodium and weigh daily. Report weight gain of greater than 2 lbs overnight or 5 lbs in 1 week. Pt verbalized understanding of instructions and plan of care.   2. MVP- severe on recent echo  3. HTN- controlled. Continue current therapy  4. Underweight- Patient's Body mass index is 17.29 kg/m². BMI is below normal parameters. Recommendations include: referral to primary care.  5. BPH- followed by Urology       Follow up in 3 months or sooner if symptoms worsen.

## 2020-08-19 ENCOUNTER — OFFICE VISIT (OUTPATIENT)
Dept: CARDIOLOGY | Facility: CLINIC | Age: 85
End: 2020-08-19

## 2020-08-19 VITALS
WEIGHT: 124 LBS | DIASTOLIC BLOOD PRESSURE: 70 MMHG | HEART RATE: 77 BPM | OXYGEN SATURATION: 98 % | BODY MASS INDEX: 17.36 KG/M2 | SYSTOLIC BLOOD PRESSURE: 121 MMHG | HEIGHT: 71 IN

## 2020-08-19 DIAGNOSIS — I10 ESSENTIAL HYPERTENSION: ICD-10-CM

## 2020-08-19 DIAGNOSIS — I34.0 NONRHEUMATIC MITRAL VALVE REGURGITATION: Primary | ICD-10-CM

## 2020-08-19 DIAGNOSIS — R63.6 PATIENT UNDERWEIGHT: ICD-10-CM

## 2020-08-19 DIAGNOSIS — I34.1 MITRAL VALVE PROLAPSE: ICD-10-CM

## 2020-08-19 PROCEDURE — 93000 ELECTROCARDIOGRAM COMPLETE: CPT | Performed by: NURSE PRACTITIONER

## 2020-08-19 PROCEDURE — 99213 OFFICE O/P EST LOW 20 MIN: CPT | Performed by: NURSE PRACTITIONER

## 2020-09-04 ENCOUNTER — PROCEDURE VISIT (OUTPATIENT)
Dept: UROLOGY | Facility: CLINIC | Age: 85
End: 2020-09-04

## 2020-09-04 DIAGNOSIS — R33.9 URINARY RETENTION: ICD-10-CM

## 2020-09-04 PROCEDURE — 99211 OFF/OP EST MAY X REQ PHY/QHP: CPT | Performed by: UROLOGY

## 2020-09-04 NOTE — PROGRESS NOTES
Roman Kenney is here today for catheter change.  Patient is seen by Dr. Cha.  The old catheter was removed without difficulty. Using sterile technique the patient was prepped with Hibiclens and new 16 fr coude catheter was placed. 10 cc of sterile water used to inflated the balloon and catheter was then attached to a overnight bag. Patient tolerated the procedure well.  Dr. Teixeira was in the office at the time of procedure. The patient was advised to return in one month for next catheter change. Patient verbalized understanding.     Lissy Rice MA  Medical assistant documentation is reviewed.  Agree with management as above  Joseph Teixeira MD  9/8/2020  16:29

## 2020-09-11 NOTE — PROGRESS NOTES
Subjective    Mr. Kenney is 85 y.o. male    Chief Complaint: BPH    History of Present Illness  Benign Prostatic Hypertrophy  Patient complains of lower urinary tract symptoms. He reports frequency, incomplete emptying, intermittency, nocturia one time a night and weak stream. He denies straining and urgency. Patient states symptoms are of mild severity. Onset of symptoms was several years ago and was gradual in onset. His AUA Symptom Score is, did not fill out.He reports a history of no complicating symptoms. He denies flank pain, gross hematuria, kidney stones and recurrent UTI.  Patient has tried Alpha blockers and 5 alpha reductase inhibitors without improvement.  Patient has failed multiple voiding trials and is not a good surgical candidate.  The following portions of the patient's history were reviewed and updated as appropriate: allergies, current medications, past family history, past medical history, past social history, past surgical history and problem list.    Review of Systems   Constitutional: Negative for chills and fever.   Gastrointestinal: Negative for abdominal pain, anal bleeding and blood in stool.   Genitourinary: Negative for dysuria, frequency, hematuria and urgency.         Current Outpatient Medications:   •  finasteride (PROSCAR) 5 MG tablet, TAKE 1 TABLET BY MOUTH DAILY, Disp: 90 tablet, Rfl: 0  •  tamsulosin (FLOMAX) 0.4 MG capsule 24 hr capsule, TAKE 1 CAPSULE BY MOUTH DAILY, Disp: 90 capsule, Rfl: 0    Past Medical History:   Diagnosis Date   • BPH (benign prostatic hypertrophy)    • Epididymitis    • Hypertension    • Hypertension, essential    • Mitral valve regurgitation    • Undiagnosed cardiac murmurs    • Weight loss        Past Surgical History:   Procedure Laterality Date   • COLONOSCOPY      Colon Polyp Removal    • FINGER SURGERY Right     tumor removed from a finger       Social History     Socioeconomic History   • Marital status:      Spouse name: Not on file  "  • Number of children: Not on file   • Years of education: Not on file   • Highest education level: Not on file   Tobacco Use   • Smoking status: Never Smoker   • Smokeless tobacco: Never Used   Substance and Sexual Activity   • Alcohol use: Never     Frequency: Never   • Drug use: Never   • Sexual activity: Defer       Family History   Problem Relation Age of Onset   • No Known Problems Mother    • Cancer Father    • Cancer Sister    • No Known Problems Brother    • No Known Problems Sister    • No Known Problems Sister    • No Known Problems Sister        Objective    Temp 97.6 °F (36.4 °C) (Temporal)   Ht 180.3 cm (71\")   Wt 56.2 kg (123 lb 12.8 oz)   BMI 17.27 kg/m²     Physical Exam   Constitutional: He is oriented to person, place, and time. He appears well-developed. No distress.   Pulmonary/Chest: Effort normal.   Abdominal: Soft. He exhibits no distension and no mass. There is no abdominal tenderness. There is no rebound and no guarding. No hernia.   Neurological: He is alert and oriented to person, place, and time.   Skin: Skin is warm and dry. He is not diaphoretic.   Vitals reviewed.          Results for orders placed or performed during the hospital encounter of 04/01/20   Adult Transthoracic Echo Complete W/ Cont if Necessary Per Protocol   Result Value Ref Range    BSA 1.7 m^2    IVSd 0.94 cm    LVIDd 6.1 cm    LVIDs 3.5 cm    LVPWd 0.97 cm    IVS/LVPW 0.97     FS 42.7 %    EDV(Teich) 186.2 ml    ESV(Teich) 50.5 ml    EF(Teich) 72.9 %    EDV(cubed) 225.9 ml    ESV(cubed) 42.5 ml    EF(cubed) 81.2 %    LV mass(C)d 237.9 grams    LV mass(C)dI 138.2 grams/m^2    SV(Teich) 135.7 ml    SI(Teich) 78.8 ml/m^2    SV(cubed) 183.4 ml    SI(cubed) 106.5 ml/m^2    MV Diam 3.6 cm    Ao root diam 3.3 cm    Ao root area 8.6 cm^2    LA dimension 4.9 cm    LA/Ao 1.5     LVOT diam 2.0 cm    LVOT area 3.1 cm^2    LVOT area(traced) 3.1 cm^2    LVLd ap4 7.9 cm    EDV(MOD-sp4) 120.0 ml    LVLs ap4 6.2 cm    " ESV(MOD-sp4) 50.5 ml    EF(MOD-sp4) 57.9 %    SV(MOD-sp4) 69.5 ml    SI(MOD-sp4) 40.4 ml/m^2    Ao root area (BSA corrected) 1.9     LV Shine Vol (BSA corrected) 69.7 ml/m^2    LV Sys Vol (BSA corrected) 29.3 ml/m^2    MV E max armando 163.0 cm/sec    MV A max armando 72.8 cm/sec    MV E/A 2.2     MV area (1 diam) 10.2 cm^2    MV Flow area(1diam) 10.2 cm^2    MV dec time 0.17 sec    Ao pk armando 133.0 cm/sec    Ao max PG 7.1 mmHg    Ao max PG (full) 3.6 mmHg    Ao V2 mean 83.8 cm/sec    Ao mean PG 3.0 mmHg    Ao mean PG (full) 1.0 mmHg    Ao V2 VTI 21.5 cm    ESTELA(I,A) 2.0 cm^2    ESTELA(I,D) 2.0 cm^2    ESTELA(V,A) 2.2 cm^2    ESTELA(V,D) 2.2 cm^2    LV V1 max PG 3.5 mmHg    LV V1 mean PG 2.0 mmHg    LV V1 max 93.1 cm/sec    LV V1 mean 60.8 cm/sec    LV V1 VTI 13.6 cm    MR max armando 525.0 cm/sec    MR max .4 mmHg    MR mean armando 417.0 cm/sec    MR mean PG 77.0 mmHg    MR .0 cm    SV(Ao) 183.9 ml    SI(Ao) 106.8 ml/m^2    SV(LVOT) 42.7 ml    SI(LVOT) 24.8 ml/m^2    PA V2 max 72.3 cm/sec    PA max PG 2.1 mmHg    TR max armando 316.0 cm/sec    RVSP(TR) 44.9 mmHg    RAP systole 5.0 mmHg     CV ECHO JEANETTE - BZI_BMI 17.3 kilograms/m^2     CV ECHO JEANETTE - BSA(HAYCOCK) 1.7 m^2     CV ECHO JEANETTE - BZI_METRIC_WEIGHT 56.2 kg     CV ECHO JEANETTE - BZI_METRIC_HEIGHT 180.3 cm    Target HR (85%) 115 bpm    Max. Pred. HR (100%) 135 bpm    LA volume 109.0 cm3    LA Volume Index 63.4 mL/m2    Avg E/e' ratio 22.75     Lat Peak E' Armando 10.3 cm/sec    Med Peak E' Armando 4.03 cm/sec     Assessment and Plan    Diagnoses and all orders for this visit:    Benign prostatic hyperplasia with urinary obstruction  -     Cancel: POC Urinalysis Dipstick, Multipro  -     Ambulatory Referral to Urology    Retention, urine  -     Ambulatory Referral to Urology    Renal cyst    Patient with a history of simple cysts.     He also has a history of BPH and is on dual therapy.  He has failed multiple voiding trials.  Cystoscopy shows a humongous prostate and on CT  measuring 8 x 7 cm.  A large intravesical lobe.  Patient has severe mitral regurgitation but has denied surgical intervention at this time.  He is asymptomatic from this.    He is catheter dependent and has failed multiple voiding trials.  He is adamant about removing catheter for quality of life.  Previously has denied SPT.  I had a long discussion with him.  His prostate is too large for a TURP.  He is not interested in suprapubic prostatectomy.  I am going to send him to Dr. Gabrielle Christina for an opinion about HOLEP.

## 2020-09-14 ENCOUNTER — OFFICE VISIT (OUTPATIENT)
Dept: UROLOGY | Facility: CLINIC | Age: 85
End: 2020-09-14

## 2020-09-14 VITALS — BODY MASS INDEX: 17.33 KG/M2 | HEIGHT: 71 IN | TEMPERATURE: 97.6 F | WEIGHT: 123.8 LBS

## 2020-09-14 DIAGNOSIS — N13.8 BENIGN PROSTATIC HYPERPLASIA WITH URINARY OBSTRUCTION: Primary | ICD-10-CM

## 2020-09-14 DIAGNOSIS — N28.1 RENAL CYST: ICD-10-CM

## 2020-09-14 DIAGNOSIS — R33.9 RETENTION, URINE: ICD-10-CM

## 2020-09-14 DIAGNOSIS — N40.1 BENIGN PROSTATIC HYPERPLASIA WITH URINARY OBSTRUCTION: Primary | ICD-10-CM

## 2020-09-14 PROCEDURE — 99214 OFFICE O/P EST MOD 30 MIN: CPT | Performed by: UROLOGY

## 2020-09-23 DIAGNOSIS — N13.8 BENIGN PROSTATIC HYPERPLASIA WITH URINARY OBSTRUCTION: ICD-10-CM

## 2020-09-23 DIAGNOSIS — N40.1 BENIGN PROSTATIC HYPERPLASIA WITH URINARY OBSTRUCTION: ICD-10-CM

## 2020-09-24 RX ORDER — TAMSULOSIN HYDROCHLORIDE 0.4 MG/1
1 CAPSULE ORAL DAILY
Qty: 90 CAPSULE | Refills: 0 | Status: SHIPPED | OUTPATIENT
Start: 2020-09-24 | End: 2020-12-10

## 2020-10-08 ENCOUNTER — PROCEDURE VISIT (OUTPATIENT)
Dept: UROLOGY | Facility: CLINIC | Age: 85
End: 2020-10-08

## 2020-10-08 DIAGNOSIS — R33.9 RETENTION, URINE: Primary | ICD-10-CM

## 2020-10-08 PROCEDURE — 99211 OFF/OP EST MAY X REQ PHY/QHP: CPT | Performed by: UROLOGY

## 2020-10-08 NOTE — PROGRESS NOTES
Patient is here today with his daughter for catheter change.  Patient is seen by Dr. Cha.  The old catheter was removed without difficulty. Using sterile technique the patient was prepped with Betadine swabs x3.  16F Coude catheter was placed using sterile technique . 10 cc of sterile water used to inflated the balloon and catheter was then attached to an overnight bag. Patient tolerated the procedure well.  Dr. Morgan  was in the office at the time of procedure. The patient was advised to return in 1 month for next catheter change. Patient verbalized understanding. TOMMY Cintron      Medical Assistant documentation is reviewed. Agree with management.       Medical assistant documentation is reviewed.  Agree with management as above  Ponce Morgan MD  10/14/2020  21:56 CDT

## 2020-10-09 ENCOUNTER — HOSPITAL ENCOUNTER (EMERGENCY)
Facility: HOSPITAL | Age: 85
Discharge: HOME | End: 2020-10-09
Admitting: EMERGENCY MEDICINE

## 2020-10-09 ENCOUNTER — TELEPHONE (OUTPATIENT)
Dept: UROLOGY | Facility: CLINIC | Age: 85
End: 2020-10-09

## 2020-10-09 VITALS
RESPIRATION RATE: 16 BRPM | BODY MASS INDEX: 17.08 KG/M2 | SYSTOLIC BLOOD PRESSURE: 120 MMHG | HEIGHT: 71 IN | WEIGHT: 122 LBS | TEMPERATURE: 98.1 F | OXYGEN SATURATION: 96 % | DIASTOLIC BLOOD PRESSURE: 77 MMHG | HEART RATE: 98 BPM

## 2020-10-09 DIAGNOSIS — N30.01 ACUTE CYSTITIS WITH HEMATURIA: ICD-10-CM

## 2020-10-09 DIAGNOSIS — R33.9 URINARY RETENTION: Primary | ICD-10-CM

## 2020-10-09 DIAGNOSIS — R79.89 ELEVATED SERUM CREATININE: ICD-10-CM

## 2020-10-09 LAB
ALBUMIN SERPL-MCNC: 4.6 G/DL (ref 3.5–5.2)
ALBUMIN/GLOB SERPL: 1.4 G/DL
ALP SERPL-CCNC: 131 U/L (ref 39–117)
ALT SERPL W P-5'-P-CCNC: 11 U/L (ref 1–41)
ANION GAP SERPL CALCULATED.3IONS-SCNC: 13 MMOL/L (ref 5–15)
APTT PPP: 26.9 SECONDS (ref 24.1–35)
AST SERPL-CCNC: 23 U/L (ref 1–40)
BACTERIA UR QL AUTO: ABNORMAL /HPF
BASOPHILS # BLD AUTO: 0.03 10*3/MM3 (ref 0–0.2)
BASOPHILS NFR BLD AUTO: 0.3 % (ref 0–1.5)
BILIRUB SERPL-MCNC: 1 MG/DL (ref 0–1.2)
BILIRUB UR QL STRIP: NEGATIVE
BUN SERPL-MCNC: 23 MG/DL (ref 8–23)
BUN/CREAT SERPL: 17.4 (ref 7–25)
CALCIUM SPEC-SCNC: 9.9 MG/DL (ref 8.6–10.5)
CHLORIDE SERPL-SCNC: 101 MMOL/L (ref 98–107)
CLARITY UR: ABNORMAL
CO2 SERPL-SCNC: 26 MMOL/L (ref 22–29)
COLOR UR: ABNORMAL
CREAT SERPL-MCNC: 1.32 MG/DL (ref 0.76–1.27)
DEPRECATED RDW RBC AUTO: 46.9 FL (ref 37–54)
EOSINOPHIL # BLD AUTO: 0 10*3/MM3 (ref 0–0.4)
EOSINOPHIL NFR BLD AUTO: 0 % (ref 0.3–6.2)
ERYTHROCYTE [DISTWIDTH] IN BLOOD BY AUTOMATED COUNT: 14.3 % (ref 12.3–15.4)
GFR SERPL CREATININE-BSD FRML MDRD: 62 ML/MIN/1.73
GLOBULIN UR ELPH-MCNC: 3.3 GM/DL
GLUCOSE SERPL-MCNC: 149 MG/DL (ref 65–99)
GLUCOSE UR STRIP-MCNC: NEGATIVE MG/DL
HCT VFR BLD AUTO: 42.3 % (ref 37.5–51)
HGB BLD-MCNC: 13.7 G/DL (ref 13–17.7)
HGB UR QL STRIP.AUTO: ABNORMAL
HYALINE CASTS UR QL AUTO: ABNORMAL /LPF
IMM GRANULOCYTES # BLD AUTO: 0.04 10*3/MM3 (ref 0–0.05)
IMM GRANULOCYTES NFR BLD AUTO: 0.3 % (ref 0–0.5)
INR PPP: 1.15 (ref 0.91–1.09)
KETONES UR QL STRIP: NEGATIVE
LEUKOCYTE ESTERASE UR QL STRIP.AUTO: ABNORMAL
LYMPHOCYTES # BLD AUTO: 0.54 10*3/MM3 (ref 0.7–3.1)
LYMPHOCYTES NFR BLD AUTO: 4.6 % (ref 19.6–45.3)
MCH RBC QN AUTO: 29.1 PG (ref 26.6–33)
MCHC RBC AUTO-ENTMCNC: 32.4 G/DL (ref 31.5–35.7)
MCV RBC AUTO: 89.8 FL (ref 79–97)
MONOCYTES # BLD AUTO: 0.73 10*3/MM3 (ref 0.1–0.9)
MONOCYTES NFR BLD AUTO: 6.2 % (ref 5–12)
NEUTROPHILS NFR BLD AUTO: 10.41 10*3/MM3 (ref 1.7–7)
NEUTROPHILS NFR BLD AUTO: 88.6 % (ref 42.7–76)
NITRITE UR QL STRIP: POSITIVE
NRBC BLD AUTO-RTO: 0 /100 WBC (ref 0–0.2)
PH UR STRIP.AUTO: 5.5 [PH] (ref 5–8)
PLATELET # BLD AUTO: 175 10*3/MM3 (ref 140–450)
PMV BLD AUTO: 11.2 FL (ref 6–12)
POTASSIUM SERPL-SCNC: 4.1 MMOL/L (ref 3.5–5.2)
PROT SERPL-MCNC: 7.9 G/DL (ref 6–8.5)
PROT UR QL STRIP: ABNORMAL
PROTHROMBIN TIME: 14.3 SECONDS (ref 11.9–14.6)
RBC # BLD AUTO: 4.71 10*6/MM3 (ref 4.14–5.8)
RBC # UR: ABNORMAL /HPF
REF LAB TEST METHOD: ABNORMAL
SODIUM SERPL-SCNC: 140 MMOL/L (ref 136–145)
SP GR UR STRIP: 1.02 (ref 1–1.03)
SQUAMOUS #/AREA URNS HPF: ABNORMAL /HPF
UROBILINOGEN UR QL STRIP: ABNORMAL
WBC # BLD AUTO: 11.75 10*3/MM3 (ref 3.4–10.8)
WBC CLUMPS # UR AUTO: ABNORMAL /HPF
WBC UR QL AUTO: ABNORMAL /HPF

## 2020-10-09 PROCEDURE — 87077 CULTURE AEROBIC IDENTIFY: CPT | Performed by: NURSE PRACTITIONER

## 2020-10-09 PROCEDURE — 81001 URINALYSIS AUTO W/SCOPE: CPT | Performed by: NURSE PRACTITIONER

## 2020-10-09 PROCEDURE — 80053 COMPREHEN METABOLIC PANEL: CPT | Performed by: NURSE PRACTITIONER

## 2020-10-09 PROCEDURE — 85025 COMPLETE CBC W/AUTO DIFF WBC: CPT | Performed by: NURSE PRACTITIONER

## 2020-10-09 PROCEDURE — 99283 EMERGENCY DEPT VISIT LOW MDM: CPT

## 2020-10-09 PROCEDURE — 85610 PROTHROMBIN TIME: CPT | Performed by: NURSE PRACTITIONER

## 2020-10-09 PROCEDURE — 85730 THROMBOPLASTIN TIME PARTIAL: CPT | Performed by: NURSE PRACTITIONER

## 2020-10-09 PROCEDURE — 87086 URINE CULTURE/COLONY COUNT: CPT | Performed by: NURSE PRACTITIONER

## 2020-10-09 PROCEDURE — 87186 SC STD MICRODIL/AGAR DIL: CPT | Performed by: NURSE PRACTITIONER

## 2020-10-09 RX ORDER — CEPHALEXIN 500 MG/1
500 CAPSULE ORAL 2 TIMES DAILY
Qty: 14 CAPSULE | Refills: 0 | Status: SHIPPED | OUTPATIENT
Start: 2020-10-09 | End: 2020-10-16

## 2020-10-09 NOTE — TELEPHONE ENCOUNTER
PT WIFE CALLED, PT HAD CATH CHANGED ON 10/8/2020 AND HAS HAD ZERO COLLECTION IN BAG AND IS IN EXTREME PAIN, WAS ADVISED TO GO TO ER PER DR DOCKERY.

## 2020-10-09 NOTE — ED PROVIDER NOTES
Subjective   Patient is an 85-year-old male presents to the ER today with complaint of urinary retention.  The patient has an indwelling Rivera catheter due to urinary retention.  He states that he intermittently has this changed out at the urology office.  He states that he did have this changed out yesterday.  He states that since this morning he has been having a lot of pain in his suprapubic area.  He states that he is now having some blood in the Rivera catheter bag that began when he arrived to the ER.  The patient reports that he has not had any urine output from the Rivera catheter since 5 AM today.  He presents here today for further evaluation.  Not on anticoagulants.      History provided by:  Patient   used: No    Other  Location:  FC not draining  Severity:  Mild  Onset quality:  Sudden  Duration:  1 day  Timing:  Constant  Progression:  Unchanged  Chronicity:  New  Associated symptoms: abdominal pain    Associated symptoms: no chest pain, no congestion, no cough, no diarrhea, no ear pain, no fatigue, no fever, no headaches, no loss of consciousness, no myalgias, no nausea, no rash, no rhinorrhea, no shortness of breath, no sore throat, no vomiting and no wheezing        Review of Systems   Constitutional: Negative for fatigue and fever.   HENT: Negative for congestion, ear pain, rhinorrhea and sore throat.    Respiratory: Negative for cough, shortness of breath and wheezing.    Cardiovascular: Negative for chest pain.   Gastrointestinal: Positive for abdominal pain. Negative for diarrhea, nausea and vomiting.   Musculoskeletal: Negative for myalgias.   Skin: Negative for rash.   Neurological: Negative for loss of consciousness and headaches.   All other systems reviewed and are negative.      Past Medical History:   Diagnosis Date   • BPH (benign prostatic hypertrophy)    • Epididymitis    • Hypertension    • Hypertension, essential    • Mitral valve regurgitation    • Undiagnosed  cardiac murmurs    • Weight loss        No Known Allergies    Past Surgical History:   Procedure Laterality Date   • COLONOSCOPY      Colon Polyp Removal    • FINGER SURGERY Right     tumor removed from a finger       Family History   Problem Relation Age of Onset   • No Known Problems Mother    • Cancer Father    • Cancer Sister    • No Known Problems Brother    • No Known Problems Sister    • No Known Problems Sister    • No Known Problems Sister        Social History     Socioeconomic History   • Marital status:      Spouse name: Not on file   • Number of children: Not on file   • Years of education: Not on file   • Highest education level: Not on file   Tobacco Use   • Smoking status: Never Smoker   • Smokeless tobacco: Never Used   Substance and Sexual Activity   • Alcohol use: Never     Frequency: Never   • Drug use: Never   • Sexual activity: Defer           Objective   Physical Exam  Vitals signs and nursing note reviewed.   Constitutional:       Appearance: Normal appearance.   HENT:      Head: Normocephalic and atraumatic.   Cardiovascular:      Rate and Rhythm: Normal rate.   Pulmonary:      Effort: Pulmonary effort is normal.      Breath sounds: Normal breath sounds.   Abdominal:      General: Bowel sounds are normal.      Palpations: Abdomen is soft.      Tenderness: There is abdominal tenderness in the suprapubic area.      Comments: FC in place   Skin:     General: Skin is warm and dry.      Capillary Refill: Capillary refill takes less than 2 seconds.   Neurological:      General: No focal deficit present.      Mental Status: He is alert.   Psychiatric:         Mood and Affect: Mood normal.         Procedures           ED Course  ED Course as of Oct 09 1316   Fri Oct 09, 2020   1315 Patient reported that he felt that the Rivera catheter was not in place.  DAVID Finch did deflate the catheter balloon and did push the catheter forward at that point.  The patient states that he felt much better and  the Rivera catheter began draining without any difficulty.  Patient's labs show that his creatinine is slightly elevated and advised him to follow-up with his urologist for this.  He also has urinary tract infection we will treat him with Keflex.  Patient will be discharged home at this time in stable condition of asked return to ER for new or worsening symptoms.    [LF]      ED Course User Index  [LF] QuezadaKristal galloway, APRN                                   No orders to display     Labs Reviewed   COMPREHENSIVE METABOLIC PANEL - Abnormal; Notable for the following components:       Result Value    Glucose 149 (*)     Creatinine 1.32 (*)     Alkaline Phosphatase 131 (*)     All other components within normal limits    Narrative:     GFR Normal >60  Chronic Kidney Disease <60  Kidney Failure <15     PROTIME-INR - Abnormal; Notable for the following components:    INR 1.15 (*)     All other components within normal limits   URINALYSIS W/ CULTURE IF INDICATED - Abnormal; Notable for the following components:    Color, UA Other (*)     Appearance, UA Cloudy (*)     Blood, UA Large (3+) (*)     Protein,  mg/dL (2+) (*)     Leuk Esterase, UA Large (3+) (*)     Nitrite, UA Positive (*)     All other components within normal limits   CBC WITH AUTO DIFFERENTIAL - Abnormal; Notable for the following components:    WBC 11.75 (*)     Neutrophil % 88.6 (*)     Lymphocyte % 4.6 (*)     Eosinophil % 0.0 (*)     Neutrophils, Absolute 10.41 (*)     Lymphocytes, Absolute 0.54 (*)     All other components within normal limits   URINALYSIS, MICROSCOPIC ONLY - Abnormal; Notable for the following components:    RBC, UA Too Numerous to Count (*)     WBC, UA Too Numerous to Count (*)     Bacteria, UA 4+ (*)     All other components within normal limits   APTT - Normal   URINE CULTURE   CBC AND DIFFERENTIAL    Narrative:     The following orders were created for panel order CBC & Differential.  Procedure                                Abnormality         Status                     ---------                               -----------         ------                     CBC Auto Differential[[id number]]        Abnormal            Final result                 Please view results for these tests on the individual orders.             MDM  Number of Diagnoses or Management Options  Acute cystitis with hematuria: new and requires workup  Elevated serum creatinine: new and requires workup  Urinary retention: new and requires workup     Amount and/or Complexity of Data Reviewed  Clinical lab tests: ordered and reviewed  Decide to obtain previous medical records or to obtain history from someone other than the patient: yes  Discuss the patient with other providers: yes    Patient Progress  Patient progress: stable      Final diagnoses:   Urinary retention   Elevated serum creatinine   Acute cystitis with hematuria            Kristal Quezada, APRN  10/09/20 1312

## 2020-10-11 LAB — BACTERIA SPEC AEROBE CULT: ABNORMAL

## 2020-10-12 ENCOUNTER — EPISODE CHANGES (OUTPATIENT)
Dept: CASE MANAGEMENT | Facility: OTHER | Age: 85
End: 2020-10-12

## 2020-10-12 ENCOUNTER — PATIENT OUTREACH (OUTPATIENT)
Dept: CASE MANAGEMENT | Facility: OTHER | Age: 85
End: 2020-10-12

## 2020-10-12 NOTE — OUTREACH NOTE
Patient Outreach Note    ACO patient seen at [facility] ED 10-9-2020 for Rivera complications. ACM outreach with patients spouse. She reports no further hematuria or pain. He filled the cephalexin and is taking as directed. Spouse denied needs or questions. Spouse was not engaged in conversation.     Crystal Cote RN  Ambulatory     10/12/2020, 11:43 CDT

## 2020-10-28 ENCOUNTER — EPISODE CHANGES (OUTPATIENT)
Dept: CASE MANAGEMENT | Facility: OTHER | Age: 85
End: 2020-10-28

## 2020-11-05 ENCOUNTER — PROCEDURE VISIT (OUTPATIENT)
Dept: UROLOGY | Facility: CLINIC | Age: 85
End: 2020-11-05

## 2020-11-05 DIAGNOSIS — N13.8 BPH WITH OBSTRUCTION/LOWER URINARY TRACT SYMPTOMS: ICD-10-CM

## 2020-11-05 DIAGNOSIS — N40.1 BPH WITH OBSTRUCTION/LOWER URINARY TRACT SYMPTOMS: ICD-10-CM

## 2020-11-05 PROCEDURE — 99211 OFF/OP EST MAY X REQ PHY/QHP: CPT | Performed by: UROLOGY

## 2020-11-05 NOTE — PROGRESS NOTES
Patient is here today with his daughter for catheter change.  Patient is seen by Dr. Cha.  The old catheter was removed without difficulty. Using sterile technique the patient was prepped with Betadine swabs x3.  16F Coude catheter was placed using sterile technique . 10 cc of sterile water used to inflated the balloon and catheter was then attached to an overnight bag. Patient tolerated the procedure well.  Dr. Morgan  was in the office at the time of procedure. The patient was advised to return in 1 month for next catheter change. Patient verbalized understanding. Lissy Rice MA.      Medical Assistant documentation is reviewed. Agree with management.     Medical assistant documentation is reviewed.  Agree with management as above  Ponce Morgan MD  11/5/2020  16:30 CST

## 2020-11-10 NOTE — PROGRESS NOTES
Subjective    Mr. Kenney is 85 y.o. male    Chief Complaint: BPH.    History of Present Illness  Benign Prostatic Hypertrophy  Patient complains of lower urinary tract symptoms. He reports frequency, incomplete emptying, intermittency, nocturia one time a night and weak stream. He denies straining and urgency. Patient states symptoms are of mild severity. Onset of symptoms was several years ago and was gradual in onset. His AUA Symptom Score is, did not fill out.He reports a history of no complicating symptoms. He denies flank pain, gross hematuria, kidney stones and recurrent UTI.  Patient has tried Alpha blockers and 5 alpha reductase inhibitors without improvement.  Patient has failed multiple voiding trials and is not a good surgical candidate.    The following portions of the patient's history were reviewed and updated as appropriate: allergies, current medications, past family history, past medical history, past social history, past surgical history and problem list.    Review of Systems   Constitutional: Negative for chills and fever.   Gastrointestinal: Negative for abdominal pain, anal bleeding and blood in stool.   Genitourinary: Negative for dysuria, frequency, hematuria and urgency.         Current Outpatient Medications:   •  finasteride (PROSCAR) 5 MG tablet, TAKE 1 TABLET BY MOUTH DAILY, Disp: 90 tablet, Rfl: 0  •  tamsulosin (FLOMAX) 0.4 MG capsule 24 hr capsule, TAKE 1 CAPSULE BY MOUTH DAILY (Patient taking differently: Take 1 capsule by mouth Daily. Take 2 tablets daily), Disp: 90 capsule, Rfl: 0    Past Medical History:   Diagnosis Date   • BPH (benign prostatic hypertrophy)    • Epididymitis    • Hypertension    • Hypertension, essential    • Mitral valve regurgitation    • Undiagnosed cardiac murmurs    • Weight loss        Past Surgical History:   Procedure Laterality Date   • COLONOSCOPY      Colon Polyp Removal    • FINGER SURGERY Right     tumor removed from a finger       Social  "History     Socioeconomic History   • Marital status:      Spouse name: Not on file   • Number of children: Not on file   • Years of education: Not on file   • Highest education level: Not on file   Tobacco Use   • Smoking status: Never Smoker   • Smokeless tobacco: Never Used   Substance and Sexual Activity   • Alcohol use: Never     Frequency: Never   • Drug use: Never   • Sexual activity: Defer       Family History   Problem Relation Age of Onset   • No Known Problems Mother    • Cancer Father    • Cancer Sister    • No Known Problems Brother    • No Known Problems Sister    • No Known Problems Sister    • No Known Problems Sister        Objective    Temp 98.5 °F (36.9 °C) (Temporal)   Ht 176.5 cm (69.5\")   Wt 56.7 kg (125 lb)   BMI 18.19 kg/m²     Physical Exam        Results for orders placed or performed during the hospital encounter of 10/09/20   Urine Culture - Urine, Urine, Catheter    Specimen: Urine, Catheter   Result Value Ref Range    Urine Culture >100,000 CFU/mL Escherichia coli (A)        Susceptibility    Escherichia coli - TAMMY     Ampicillin 16 Intermediate ug/ml     Ampicillin + Sulbactam <=2 Susceptible ug/ml     Cefazolin <=4 Susceptible ug/ml     Cefepime <=1 Susceptible ug/ml     Ceftazidime <=1 Susceptible ug/ml     Ceftriaxone <=1 Susceptible ug/ml     Gentamicin <=1 Susceptible ug/ml     Levofloxacin <=0.12 Susceptible ug/ml     Nitrofurantoin <=16 Susceptible ug/ml     Piperacillin + Tazobactam <=4 Susceptible ug/ml     Tetracycline <=1 Susceptible ug/ml     Trimethoprim + Sulfamethoxazole <=20 Susceptible ug/ml   Comprehensive Metabolic Panel    Specimen: Blood   Result Value Ref Range    Glucose 149 (H) 65 - 99 mg/dL    BUN 23 8 - 23 mg/dL    Creatinine 1.32 (H) 0.76 - 1.27 mg/dL    Sodium 140 136 - 145 mmol/L    Potassium 4.1 3.5 - 5.2 mmol/L    Chloride 101 98 - 107 mmol/L    CO2 26.0 22.0 - 29.0 mmol/L    Calcium 9.9 8.6 - 10.5 mg/dL    Total Protein 7.9 6.0 - 8.5 g/dL    " Albumin 4.60 3.50 - 5.20 g/dL    ALT (SGPT) 11 1 - 41 U/L    AST (SGOT) 23 1 - 40 U/L    Alkaline Phosphatase 131 (H) 39 - 117 U/L    Total Bilirubin 1.0 0.0 - 1.2 mg/dL    eGFR  African Amer 62 >60 mL/min/1.73    Globulin 3.3 gm/dL    A/G Ratio 1.4 g/dL    BUN/Creatinine Ratio 17.4 7.0 - 25.0    Anion Gap 13.0 5.0 - 15.0 mmol/L   Protime-INR    Specimen: Blood   Result Value Ref Range    Protime 14.3 11.9 - 14.6 Seconds    INR 1.15 (H) 0.91 - 1.09   aPTT    Specimen: Blood   Result Value Ref Range    PTT 26.9 24.1 - 35.0 seconds   Urinalysis With Culture If Indicated - Urine, Catheter    Specimen: Urine, Catheter   Result Value Ref Range    Color, UA Other (A) Yellow, Straw    Appearance, UA Cloudy (A) Clear    pH, UA 5.5 5.0 - 8.0    Specific [place], UA 1.025 >1.005-<1.030    Glucose, UA Negative Negative    Ketones, UA Negative Negative    Bilirubin, UA Negative Negative    Blood, UA Large (3+) (A) Negative    Protein,  mg/dL (2+) (A) Negative    Leuk Esterase, UA Large (3+) (A) Negative    Nitrite, UA Positive (A) Negative    Urobilinogen, UA 0.2 E.U./dL 0.2 - 1.0 E.U./dL   CBC Auto Differential    Specimen: Blood   Result Value Ref Range    WBC 11.75 (H) 3.40 - 10.80 10*3/mm3    RBC 4.71 4.14 - 5.80 10*6/mm3    Hemoglobin 13.7 13.0 - 17.7 g/dL    Hematocrit 42.3 37.5 - 51.0 %    MCV 89.8 79.0 - 97.0 fL    MCH 29.1 26.6 - 33.0 pg    MCHC 32.4 31.5 - 35.7 g/dL    RDW 14.3 12.3 - 15.4 %    RDW-SD 46.9 37.0 - 54.0 fl    MPV 11.2 6.0 - 12.0 fL    Platelets 175 140 - 450 10*3/mm3    Neutrophil % 88.6 (H) 42.7 - 76.0 %    Lymphocyte % 4.6 (L) 19.6 - 45.3 %    Monocyte % 6.2 5.0 - 12.0 %    Eosinophil % 0.0 (L) 0.3 - 6.2 %    Basophil % 0.3 0.0 - 1.5 %    Immature Grans % 0.3 0.0 - 0.5 %    Neutrophils, Absolute 10.41 (H) 1.70 - 7.00 10*3/mm3    Lymphocytes, Absolute 0.54 (L) 0.70 - 3.10 10*3/mm3    Monocytes, Absolute 0.73 0.10 - 0.90 10*3/mm3    Eosinophils, Absolute 0.00 0.00 - 0.40 10*3/mm3    Basophils,  Absolute 0.03 0.00 - 0.20 10*3/mm3    Immature Grans, Absolute 0.04 0.00 - 0.05 10*3/mm3    nRBC 0.0 0.0 - 0.2 /100 WBC   Urinalysis, Microscopic Only - Urine, Catheter    Specimen: Urine, Catheter   Result Value Ref Range    RBC, UA Too Numerous to Count (A) None Seen /HPF    WBC, UA Too Numerous to Count (A) None Seen /HPF    Bacteria, UA 4+ (A) None Seen /HPF    Squamous Epithelial Cells, UA None Seen None Seen, 0-2 /HPF    Hyaline Casts, UA None Seen None Seen /LPF    WBC Clumps, UA Moderate/2+ None Seen /HPF    Methodology Automated Microscopy      Assessment and Plan    Diagnoses and all orders for this visit:    1. BPH with obstruction/lower urinary tract symptoms (Primary)  -     Cancel: POC Urinalysis Dipstick, Multipro    Patient with a history of simple cysts.     He also has a history of BPH and is on dual therapy.  He has failed multiple voiding trials.  Cystoscopy shows a humongous prostate and on CT measuring 8 x 7 cm.  A large intravesical lobe.  Patient has severe mitral regurgitation but has denied surgical intervention at this time.  He is asymptomatic from this. Rivera last changed 11/5.     He is catheter dependent and has failed multiple voiding trials.  He is adamant about removing catheter for quality of life.  Previously has denied SPT.  I had a long discussion with him.  His prostate is too large for a TURP.  He is not interested in suprapubic prostatectomy.He is to see [place] for HOLEP on 12/8.

## 2020-11-13 ENCOUNTER — OFFICE VISIT (OUTPATIENT)
Dept: FAMILY MEDICINE CLINIC | Facility: CLINIC | Age: 85
End: 2020-11-13

## 2020-11-13 VITALS
DIASTOLIC BLOOD PRESSURE: 74 MMHG | HEART RATE: 81 BPM | OXYGEN SATURATION: 98 % | BODY MASS INDEX: 17.61 KG/M2 | WEIGHT: 125.8 LBS | RESPIRATION RATE: 18 BRPM | HEIGHT: 71 IN | TEMPERATURE: 97.8 F | SYSTOLIC BLOOD PRESSURE: 130 MMHG

## 2020-11-13 DIAGNOSIS — I34.0 NONRHEUMATIC MITRAL VALVE REGURGITATION: Primary | ICD-10-CM

## 2020-11-13 DIAGNOSIS — N40.1 BPH WITH OBSTRUCTION/LOWER URINARY TRACT SYMPTOMS: ICD-10-CM

## 2020-11-13 DIAGNOSIS — N13.8 BPH WITH OBSTRUCTION/LOWER URINARY TRACT SYMPTOMS: ICD-10-CM

## 2020-11-13 DIAGNOSIS — R33.9 URINARY RETENTION: ICD-10-CM

## 2020-11-13 PROCEDURE — 99213 OFFICE O/P EST LOW 20 MIN: CPT | Performed by: FAMILY MEDICINE

## 2020-11-13 NOTE — PROGRESS NOTES
"Subjective   Roman Kenney is a 85 y.o. male.     Chief Complaint   Patient presents with   • Follow-up     mitral valve reguritation         History of Present Illness     He is seeing urlogy at [facility] for his bph issues---he is seeing cardiology for mvr      Current Outpatient Medications:   •  finasteride (PROSCAR) 5 MG tablet, TAKE 1 TABLET BY MOUTH DAILY, Disp: 90 tablet, Rfl: 0  •  tamsulosin (FLOMAX) 0.4 MG capsule 24 hr capsule, TAKE 1 CAPSULE BY MOUTH DAILY (Patient taking differently: Take 1 capsule by mouth Daily. Take 2 tablets daily), Disp: 90 capsule, Rfl: 0  No Known Allergies    Past Medical History:   Diagnosis Date   • BPH (benign prostatic hypertrophy)    • Epididymitis    • Hypertension    • Hypertension, essential    • Mitral valve regurgitation    • Undiagnosed cardiac murmurs    • Weight loss      Past Surgical History:   Procedure Laterality Date   • COLONOSCOPY      Colon Polyp Removal    • FINGER SURGERY Right     tumor removed from a finger       Review of Systems   Constitutional: Negative.    HENT: Negative.    Eyes: Negative.    Respiratory: Negative.    Cardiovascular: Negative.    Gastrointestinal: Negative.    Endocrine: Negative.    Genitourinary: Positive for difficulty urinating.   Musculoskeletal: Negative.    Skin: Negative.        Objective  /74 (BP Location: Left arm, Patient Position: Sitting, Cuff Size: Large Adult)   Pulse 81   Temp 97.8 °F (36.6 °C) (Infrared)   Resp 18   Ht 180.3 cm (71\")   Wt 57.1 kg (125 lb 12.8 oz)   SpO2 98%   BMI 17.55 kg/m²   Physical Exam  Vitals signs and nursing note reviewed.   Constitutional:       Appearance: Normal appearance.   HENT:      Head: Normocephalic and atraumatic.      Right Ear: Ear canal normal.      Left Ear: Ear canal normal.      Nose: Nose normal.      Mouth/Throat:      Mouth: Mucous membranes are moist.   Eyes:      Extraocular Movements: Extraocular movements intact.      Conjunctiva/sclera: Conjunctivae " normal.      Pupils: Pupils are equal, round, and reactive to light.   Neck:      Musculoskeletal: Normal range of motion and neck supple.   Cardiovascular:      Rate and Rhythm: Normal rate and regular rhythm.      Pulses: Normal pulses.      Heart sounds: Murmur present.   Pulmonary:      Effort: Pulmonary effort is normal.   Abdominal:      General: Abdomen is flat. Bowel sounds are normal.      Palpations: Abdomen is soft.   Musculoskeletal: Normal range of motion.   Skin:     General: Skin is warm and dry.      Capillary Refill: Capillary refill takes less than 2 seconds.   Neurological:      General: No focal deficit present.      Mental Status: He is alert and oriented to person, place, and time. Mental status is at baseline.   Psychiatric:         Mood and Affect: Mood normal.         Behavior: Behavior normal.         Thought Content: Thought content normal.         Judgment: Judgment normal.         Assessment/Plan   Diagnoses and all orders for this visit:    1. Nonrheumatic mitral valve regurgitation (Primary)    2. Urinary retention    3. BPH with obstruction/lower urinary tract symptoms      He will continue with cardiology and urology           No orders of the defined types were placed in this encounter.      Follow up: 6 month(s)

## 2020-11-16 ENCOUNTER — OFFICE VISIT (OUTPATIENT)
Dept: UROLOGY | Facility: CLINIC | Age: 85
End: 2020-11-16

## 2020-11-16 VITALS — HEIGHT: 70 IN | WEIGHT: 125 LBS | TEMPERATURE: 98.5 F | BODY MASS INDEX: 17.9 KG/M2

## 2020-11-16 DIAGNOSIS — R33.9 RETENTION, URINE: ICD-10-CM

## 2020-11-16 DIAGNOSIS — N28.1 RENAL CYST: ICD-10-CM

## 2020-11-16 DIAGNOSIS — N13.8 BPH WITH OBSTRUCTION/LOWER URINARY TRACT SYMPTOMS: Primary | ICD-10-CM

## 2020-11-16 DIAGNOSIS — N40.1 BPH WITH OBSTRUCTION/LOWER URINARY TRACT SYMPTOMS: Primary | ICD-10-CM

## 2020-11-16 PROCEDURE — 99213 OFFICE O/P EST LOW 20 MIN: CPT | Performed by: UROLOGY

## 2020-11-19 NOTE — PROGRESS NOTES
Subjective:     Encounter Date:11/25/2020      Patient ID: Roman Kenney is a 85 y.o. male with a history of severe mitral regurgitation, mitral valve prolapse and HTN.    You have chosen to receive care through a telephone visit. Do you consent to use a telephone visit for your medical care today? Yes    This visit has been rescheduled as a phone visit to comply with patient safety concerns in accordance with CDC recommendations. Total time of discussion was 10 minutes.      Chief Complaint: follow up  Heart Murmur  This is a chronic problem. The current episode started more than 1 year ago. The problem occurs daily. The problem has been unchanged. Pertinent negatives include no chest pain, coughing, fatigue, rash or weakness.   Hypertension  This is a chronic problem. The current episode started more than 1 year ago. The problem has been rapidly improving since onset. The problem is controlled. Pertinent negatives include no chest pain, malaise/fatigue, orthopnea, palpitations, peripheral edema, PND or shortness of breath.     Patient presents today for a routine follow up. He was evaluated by Dr. Mirza in June for potential MVR and declined surgical intervention at that time due to his mild symptoms. He continues to report he has been doing well. He denies symptoms of HF. He continues to have issues with his prostate and is going to see a specialist in [place]. His wife also reports they saw Dr. Mirza a month or so ago and plans for mitral valve replacement have been postponed. He denies chest pain, dyspnea, palpitations, orthopnea, edema, and PND.       The following portions of the patient's history were reviewed and updated as appropriate: allergies, current medications, past family history, past medical history, past social history, past surgical history and problem list.    No Known Allergies    Current Outpatient Medications:   •  finasteride (PROSCAR) 5 MG tablet, TAKE 1 TABLET BY MOUTH  "DAILY, Disp: 90 tablet, Rfl: 0  •  tamsulosin (FLOMAX) 0.4 MG capsule 24 hr capsule, TAKE 1 CAPSULE BY MOUTH DAILY (Patient taking differently: Take 1 capsule by mouth Daily. Take 2 tablets daily), Disp: 90 capsule, Rfl: 0  Past Medical History:   Diagnosis Date   • BPH (benign prostatic hypertrophy)    • Epididymitis    • Hypertension    • Hypertension, essential    • Mitral valve regurgitation    • Undiagnosed cardiac murmurs    • Weight loss        Social History     Socioeconomic History   • Marital status:      Spouse name: Not on file   • Number of children: Not on file   • Years of education: Not on file   • Highest education level: Not on file   Tobacco Use   • Smoking status: Never Smoker   • Smokeless tobacco: Never Used   Substance and Sexual Activity   • Alcohol use: Never     Frequency: Never   • Drug use: Never   • Sexual activity: Defer       Review of Systems   Constitution: Negative for fatigue, malaise/fatigue, weight gain and weight loss.   Cardiovascular: Negative for chest pain, dyspnea on exertion, irregular heartbeat, leg swelling, near-syncope, orthopnea, palpitations, paroxysmal nocturnal dyspnea and syncope.   Respiratory: Negative for cough, shortness of breath, sleep disturbances due to breathing, sputum production and wheezing.    Skin: Negative for dry skin, flushing, itching and rash.   Gastrointestinal: Negative for hematemesis and hematochezia.   Neurological: Negative for dizziness, light-headedness, loss of balance and weakness.   All other systems reviewed and are negative.         Objective:     Physical Exam  Unable to be preformed due to visit being conducted via telephone.     Procedures  Ht 176.5 cm (69.49\")   Wt 56.2 kg (124 lb)   BMI 18.06 kg/m²     Lab Review:   I have reviewed previous office notes, office notes from [facility] and recent labs.     Lab Results   Component Value Date    CHLPL 204 (H) 09/22/2016    TRIG 74 09/22/2016    HDL 69 09/22/2016     (H) " 09/22/2016     Results for orders placed during the hospital encounter of 04/01/20   Adult Transthoracic Echo Complete W/ Cont if Necessary Per Protocol    Addendum · There is severe prolapse of the posterior mitral valve leaflet present,  resulting in severe, anteriorly directed mitral regurgitation. · Normal LVEF, though EF now <60%. · Left ventricular diastolic dysfunction (grade III) consistent with  reversible restrictive pattern. · Left atrial cavity size is severely dilated. · Normal size and function of right ventricle. · Estimated RVSP, by TR jet visualized (may not fully reflect true value)  is 35-45mmHg.        Jose Junior MD 4/1/2020  3:22 PM          Narrative · There is severe prolapse of the posterior mitral valve leaflet present,   resulting in severe, anteriorly directed mitral regurgitation.  · Normal LVEF, though EF now <60%.  · Left ventricular diastolic dysfunction (grade III) consistent with   reversible restrictive pattern.  · Left atrial cavity size is severely dilated.  · Normal size and function of right ventricle.  · Estimated RVSP, by TR jet visualized (may not fully reflect true value)   is 35-45mmHg.              Assessment:          Diagnosis Plan   1. Nonrheumatic mitral valve regurgitation     2. Mitral valve prolapse     3. Essential hypertension     4. Patient underweight     5. Urinary retention            Plan:       1. Severe Mitral regurgitaiton- no issues with HF at this time. Patient declines surgical intervention. Will continue to monitor. Reviewed signs and symptoms of CHF and what to report with the patient. Patient instructed to restrict sodium and weigh daily. Report weight gain of greater than 2 lbs overnight or 5 lbs in 1 week. Pt verbalized understanding of instructions and plan of care.   2. MVP- severe on recent echo. Will recheck echo at follow up.   3. HTN- controlled. Continue current therapy  4. Underweight- Patient's Body mass index is 18.06 kg/m². BMI is  below normal parameters. Recommendations include: referral to primary care.  5. Urinary retention-  followed by Urology       Follow up in 3 months or sooner if symptoms worsen.

## 2020-11-25 ENCOUNTER — OFFICE VISIT (OUTPATIENT)
Dept: CARDIOLOGY | Facility: CLINIC | Age: 85
End: 2020-11-25

## 2020-11-25 VITALS — WEIGHT: 124 LBS | HEIGHT: 69 IN | BODY MASS INDEX: 18.37 KG/M2

## 2020-11-25 DIAGNOSIS — I34.0 NONRHEUMATIC MITRAL VALVE REGURGITATION: Primary | ICD-10-CM

## 2020-11-25 DIAGNOSIS — R63.6 PATIENT UNDERWEIGHT: ICD-10-CM

## 2020-11-25 DIAGNOSIS — R33.9 URINARY RETENTION: ICD-10-CM

## 2020-11-25 DIAGNOSIS — I34.1 MITRAL VALVE PROLAPSE: ICD-10-CM

## 2020-11-25 DIAGNOSIS — I10 ESSENTIAL HYPERTENSION: ICD-10-CM

## 2020-11-25 PROCEDURE — 99213 OFFICE O/P EST LOW 20 MIN: CPT | Performed by: NURSE PRACTITIONER

## 2020-12-04 ENCOUNTER — TELEPHONE (OUTPATIENT)
Dept: UROLOGY | Facility: CLINIC | Age: 85
End: 2020-12-04

## 2020-12-04 NOTE — TELEPHONE ENCOUNTER
Called pt to have them come in at 1300 instead of 1030 on Monday for his cath change and had to leave a message.

## 2020-12-07 ENCOUNTER — PROCEDURE VISIT (OUTPATIENT)
Dept: UROLOGY | Facility: CLINIC | Age: 85
End: 2020-12-07

## 2020-12-07 DIAGNOSIS — R33.9 RETENTION, URINE: Primary | ICD-10-CM

## 2020-12-07 PROCEDURE — 99211 OFF/OP EST MAY X REQ PHY/QHP: CPT | Performed by: UROLOGY

## 2020-12-07 NOTE — PROGRESS NOTES
Pt is here today with his wife for catheter change.  Patient is seen by Dr. Cha.  The old catheter was removed without difficulty. The patient was prepped with Betadine swabs x3.   16F Coude catheter was placed using sterile technique . 10cc of sterile water used to inflated the balloon. Catheter was flushed with 60cc piston syringe x1 and had good return. Catheter was then attached to an overnight bag. Patient tolerated the procedure well.  Dr. Cha was in the office at the time of procedure. The patient was advised to return in 1 month for next catheter change. Patient verbalized understanding. TOMMY Cintron      Medical Assistant documentation is reviewed. Agree with management.

## 2020-12-10 DIAGNOSIS — N40.1 BENIGN PROSTATIC HYPERPLASIA WITH URINARY OBSTRUCTION: ICD-10-CM

## 2020-12-10 DIAGNOSIS — N13.8 BENIGN PROSTATIC HYPERPLASIA WITH URINARY OBSTRUCTION: ICD-10-CM

## 2020-12-10 RX ORDER — TAMSULOSIN HYDROCHLORIDE 0.4 MG/1
1 CAPSULE ORAL DAILY
Qty: 90 CAPSULE | Refills: 3 | Status: SHIPPED | OUTPATIENT
Start: 2020-12-10 | End: 2021-05-18

## 2020-12-14 ENCOUNTER — TELEPHONE (OUTPATIENT)
Dept: FAMILY MEDICINE CLINIC | Facility: CLINIC | Age: 85
End: 2020-12-14

## 2020-12-14 NOTE — TELEPHONE ENCOUNTER
I spoke to Minerva and she will call us on 1/12 to let us know to do covid testing order and sending to [facility]

## 2020-12-14 NOTE — TELEPHONE ENCOUNTER
PATIENTS DAUGHTER CALLED WANTING TO GET A COVID TEST DONE FOR THE PATIENT. PATIENT IS DUE TO HAVE SURGERY ON 01/18/2021 AND HAS TO HAVE COVID TESTING COMPLETED AND RESULTS BEFORE SURGERY. PATIENT IS NOT EXPERIENCING SYMPTOMS. PLEASE CALL PATIENTS DAUGHTER TO ASSIST HER.    GOOD CALL BACK  [phone]

## 2020-12-30 ENCOUNTER — TELEPHONE (OUTPATIENT)
Dept: CARDIOLOGY | Facility: CLINIC | Age: 85
End: 2020-12-30

## 2020-12-30 NOTE — TELEPHONE ENCOUNTER
The EKG is scanned into the chart.  Can you look at this and tell me what I need to do?  Please advise.  Dixon Aguiar, CMA

## 2020-12-30 NOTE — TELEPHONE ENCOUNTER
HERACLIO Lewis with Anesthesia at [facility] called to report the patient has had an abnormal EKG that reports ST elevation.  She is faxing the EKG to the office.  Can you have Dr. Burleson review it and call them?  They can be reached at [phone].  Thank you!

## 2021-01-04 ENCOUNTER — PROCEDURE VISIT (OUTPATIENT)
Dept: UROLOGY | Facility: CLINIC | Age: 86
End: 2021-01-04

## 2021-01-04 DIAGNOSIS — R33.9 RETENTION, URINE: Primary | ICD-10-CM

## 2021-01-04 PROCEDURE — 99211 OFF/OP EST MAY X REQ PHY/QHP: CPT | Performed by: UROLOGY

## 2021-01-04 NOTE — PROGRESS NOTES
Patient is here today for catheter change.  Patient is seen by Dr. Cha.  The old catheter was removed without difficulty. The patient was prepped with Betadine swabs x3.  16F Coude' catheter was placed using sterile technique . 10 cc of sterile water used to inflate the balloon. 60cc piston syringe of sterile water was used to flush the catheter with successful return. Catheter was then attached to a an overnight bag. Patient tolerated the procedure well.  Dr. Cha was in the office at the time of procedure. The patient was advised to return in 1 month for next catheter change. Patient verbalized understanding. TOMMY Cintron      Medical Assistant documentation is reviewed. Agree with management.

## 2021-01-11 DIAGNOSIS — Z20.822 EXPOSURE TO COVID-19 VIRUS: Primary | ICD-10-CM

## 2021-01-11 NOTE — TELEPHONE ENCOUNTER
Patients daughter Mellissa is requesting that order be placed for COVID testing, because patients surgery is 1/18. She needs this done ASAP.     Callback: [phone] or    [phone]

## 2021-01-15 ENCOUNTER — TELEPHONE (OUTPATIENT)
Dept: FAMILY MEDICINE CLINIC | Facility: CLINIC | Age: 86
End: 2021-01-15

## 2021-01-15 ENCOUNTER — DOCUMENTATION (OUTPATIENT)
Dept: FAMILY MEDICINE CLINIC | Facility: CLINIC | Age: 86
End: 2021-01-15

## 2021-01-15 NOTE — TELEPHONE ENCOUNTER
PATIENT'S WIFE MUKESH CALLED AND WANTED TO RECEIVE A CALL BACK FROM SANDIP OR HIS NURSE. PLEASE CALL AND ADVISE. SHE WANTED TO KNOW ABOUT HER 'S COVID TEST.      CALL BACK: [phone]

## 2021-03-10 ENCOUNTER — TELEPHONE (OUTPATIENT)
Dept: FAMILY MEDICINE CLINIC | Facility: CLINIC | Age: 86
End: 2021-03-10

## 2021-03-10 NOTE — TELEPHONE ENCOUNTER
Caller: Arlene Meraz    Relationship to patient: Emergency Contact    Best call back number: [phone]      Patient is needing: Patient is signed up to get the covid vaccine through the health dept. They are concerned about his heart murmer. Wants a call back to confirm that it will be safe for him to get vaccine.       She stated that he had a recent prostate surgery at [facility]   - just FYI -

## 2021-05-18 ENCOUNTER — OFFICE VISIT (OUTPATIENT)
Dept: FAMILY MEDICINE CLINIC | Facility: CLINIC | Age: 86
End: 2021-05-18

## 2021-05-18 VITALS
DIASTOLIC BLOOD PRESSURE: 66 MMHG | BODY MASS INDEX: 17.04 KG/M2 | TEMPERATURE: 97.7 F | HEIGHT: 70 IN | HEART RATE: 96 BPM | SYSTOLIC BLOOD PRESSURE: 112 MMHG | WEIGHT: 119 LBS | OXYGEN SATURATION: 99 % | RESPIRATION RATE: 16 BRPM

## 2021-05-18 DIAGNOSIS — I34.0 NONRHEUMATIC MITRAL VALVE REGURGITATION: Primary | ICD-10-CM

## 2021-05-18 PROCEDURE — 99212 OFFICE O/P EST SF 10 MIN: CPT | Performed by: FAMILY MEDICINE

## 2021-05-18 PROCEDURE — G0439 PPPS, SUBSEQ VISIT: HCPCS | Performed by: FAMILY MEDICINE

## 2021-05-18 NOTE — PROGRESS NOTES
The ABCs of the Annual Wellness Visit  Subsequent Medicare Wellness Visit    Chief Complaint   Patient presents with   • Medicare Wellness-subsequent       Subjective   History of Present Illness:  Roman Kenney is a 86 y.o. male who presents for a Subsequent Medicare Wellness Visit.    HEALTH RISK ASSESSMENT    Recent Hospitalizations:  No hospitalization(s) within the last year.    Current Medical Providers:  Patient Care Team:  Yariel Hancock MD as PCP - General (Family Medicine)  Yariel Hancock MD as PCP - Family Medicine  Joseph Cha MD as Consulting Physician (Urology)    Smoking Status:  Social History     Tobacco Use   Smoking Status Never Smoker   Smokeless Tobacco Never Used       Alcohol Consumption:  Social History     Substance and Sexual Activity   Alcohol Use Never       Depression Screen:   PHQ-2/PHQ-9 Depression Screening 5/18/2021   Little interest or pleasure in doing things 0   Feeling down, depressed, or hopeless 0   Trouble falling or staying asleep, or sleeping too much -   Feeling tired or having little energy -   Poor appetite or overeating -   Feeling bad about yourself - or that you are a failure or have let yourself or your family down -   Trouble concentrating on things, such as reading the newspaper or watching television -   Moving or speaking so slowly that other people could have noticed. Or the opposite - being so fidgety or restless that you have been moving around a lot more than usual -   Thoughts that you would be better off dead, or of hurting yourself in some way -   Total Score 0   If you checked off any problems, how difficult have these problems made it for you to do your work, take care of things at home, or get along with other people? -       Fall Risk Screen:  STEADI Fall Risk Assessment was completed, and patient is at LOW risk for falls.Assessment completed on:5/18/2021    Health Habits and Functional and Cognitive  Screening:  Functional & Cognitive Status 5/18/2021   Do you have difficulty preparing food and eating? No   Do you have difficulty bathing yourself, getting dressed or grooming yourself? No   Do you have difficulty using the toilet? No   Do you have difficulty moving around from place to place? No   Do you have trouble with steps or getting out of a bed or a chair? No   Current Diet Well Balanced Diet   Dental Exam Not up to date   Eye Exam Not up to date   Exercise (times per week) 3 times per week   Current Exercises Include Walking   Current Exercise Activities Include -   Do you need help using the phone?  Yes   Are you deaf or do you have serious difficulty hearing?  Yes   Do you need help with transportation? No   Do you need help shopping? No   Do you need help preparing meals?  No   Do you need help with housework?  No   Do you need help with laundry? No   Do you need help taking your medications? No   Do you need help managing money? No   Do you ever drive or ride in a car without wearing a seat belt? No   Have you felt unusual stress, anger or loneliness in the last month? No   Who do you live with? Spouse   If you need help, do you have trouble finding someone available to you? No   Have you been bothered in the last four weeks by sexual problems? No   Do you have difficulty concentrating, remembering or making decisions? No         Does the patient have evidence of cognitive impairment? No    Asprin use counseling:Does not need ASA (and currently is not on it)    Age-appropriate Screening Schedule:  Refer to the list below for future screening recommendations based on patient's age, sex and/or medical conditions. Orders for these recommended tests are listed in the plan section. The patient has been provided with a written plan.    Health Maintenance   Topic Date Due   • TDAP/TD VACCINES (1 - Tdap) Never done   • ZOSTER VACCINE (1 of 2) Never done   • INFLUENZA VACCINE  08/01/2021          The following  "portions of the patient's history were reviewed and updated as appropriate: allergies, current medications, past family history, past medical history, past social history, past surgical history and problem list.    Outpatient Medications Prior to Visit   Medication Sig Dispense Refill   • finasteride (PROSCAR) 5 MG tablet TAKE 1 TABLET BY MOUTH DAILY 90 tablet 0   • tamsulosin (FLOMAX) 0.4 MG capsule 24 hr capsule Take 1 capsule by mouth Daily. 90 capsule 3     No facility-administered medications prior to visit.       Patient Active Problem List   Diagnosis   • Mitral valve regurgitation   • Mitral valve prolapse   • Weight loss   • Patient underweight   • Testicle pain   • Right lower quadrant abdominal pain   • Epididymitis   • Herpes zoster without complication   • Urinary retention   • BPH with obstruction/lower urinary tract symptoms       Advanced Care Planning:  ACP discussion was held with the patient during this visit. Patient does not have an advance directive, information provided.    Review of Systems    Compared to one year ago, the patient feels his physical health is the same.  Compared to one year ago, the patient feels his mental health is the same.    Reviewed chart for potential of high risk medication in the elderly: yes  Reviewed chart for potential of harmful drug interactions in the elderly:yes    Objective         Vitals:    05/18/21 0853   BP: 112/66   BP Location: Left arm   Pulse: 96   Resp: 16   Temp: 97.7 °F (36.5 °C)   SpO2: 99%   Weight: 54 kg (119 lb)   Height: 176.5 cm (69.5\")   PainSc: 0-No pain       Body mass index is 17.32 kg/m².  Discussed the patient's BMI with him. The BMI is below average; BMI management plan is completed.    Physical Exam          Assessment/Plan   Medicare Risks and Personalized Health Plan  CMS Preventative Services Quick Reference  Advance Directive Discussion    The above risks/problems have been discussed with the patient.  Pertinent information has " been shared with the patient in the After Visit Summary.  Follow up plans and orders are seen below in the Assessment/Plan Section.    Diagnoses and all orders for this visit:    1. Nonrheumatic mitral valve regurgitation (Primary)      Follow Up:  No follow-ups on file.     An After Visit Summary and PPPS were given to the patient.

## 2021-05-18 NOTE — PROGRESS NOTES
"Subjective   Roman Kenney is a 86 y.o. male.     Chief Complaint   Patient presents with   • Medicare Wellness-subsequent        History of Present Illness     he recently had surgery at [facility] regarding \"his prostate\"--he no longer has gerardo catheter    No current outpatient medications on file.  No Known Allergies    Past Medical History:   Diagnosis Date   • BPH (benign prostatic hypertrophy)    • Epididymitis    • Hypertension    • Hypertension, essential    • Mitral valve regurgitation    • Undiagnosed cardiac murmurs    • Weight loss      Past Surgical History:   Procedure Laterality Date   • COLONOSCOPY      Colon Polyp Removal    • FINGER SURGERY Right     tumor removed from a finger       Review of Systems   Constitutional: Negative.    HENT: Negative.    Eyes: Negative.    Respiratory: Negative.    Cardiovascular: Negative.    Gastrointestinal: Negative.    Endocrine: Negative.    Genitourinary: Negative.    Musculoskeletal: Negative.    Skin: Negative.    Allergic/Immunologic: Negative.    Neurological: Negative.    Hematological: Negative.    Psychiatric/Behavioral: Negative.        Objective  /66 (BP Location: Left arm)   Pulse 96   Temp 97.7 °F (36.5 °C)   Resp 16   Ht 176.5 cm (69.5\")   Wt 54 kg (119 lb)   SpO2 99%   BMI 17.32 kg/m²   Physical Exam  Vitals and nursing note reviewed.   Constitutional:       Appearance: Normal appearance. He is normal weight.   HENT:      Head: Normocephalic.      Nose: Nose normal.      Mouth/Throat:      Mouth: Mucous membranes are moist.      Pharynx: Oropharynx is clear.   Eyes:      Conjunctiva/sclera: Conjunctivae normal.      Pupils: Pupils are equal, round, and reactive to light.   Cardiovascular:      Rate and Rhythm: Normal rate and regular rhythm.      Pulses: Normal pulses.      Heart sounds: Murmur heard.     Pulmonary:      Effort: Pulmonary effort is normal.      Breath sounds: Normal breath sounds.   Abdominal:      General: Abdomen " is flat. Bowel sounds are normal.      Palpations: Abdomen is soft.   Musculoskeletal:         General: Normal range of motion.      Cervical back: Normal range of motion.   Skin:     General: Skin is warm.      Capillary Refill: Capillary refill takes less than 2 seconds.   Neurological:      General: No focal deficit present.      Mental Status: He is alert and oriented to person, place, and time. Mental status is at baseline.   Psychiatric:         Mood and Affect: Mood normal.         Behavior: Behavior normal.         Thought Content: Thought content normal.         Judgment: Judgment normal.         Assessment/Plan   Diagnoses and all orders for this visit:    1. Nonrheumatic mitral valve regurgitation (Primary)      He will keep appt with [facility] in sept           No orders of the defined types were placed in this encounter.      Follow up: 6 month(s)

## 2021-08-17 ENCOUNTER — OFFICE VISIT (OUTPATIENT)
Dept: FAMILY MEDICINE CLINIC | Facility: CLINIC | Age: 86
End: 2021-08-17

## 2021-08-17 VITALS — TEMPERATURE: 104.4 F | OXYGEN SATURATION: 96 % | HEART RATE: 98 BPM

## 2021-08-17 DIAGNOSIS — Z20.822 EXPOSURE TO COVID-19 VIRUS: ICD-10-CM

## 2021-08-17 DIAGNOSIS — R50.9 FEVER, UNSPECIFIED FEVER CAUSE: ICD-10-CM

## 2021-08-17 DIAGNOSIS — R50.9 FEVER, UNSPECIFIED FEVER CAUSE: Primary | ICD-10-CM

## 2021-08-17 PROCEDURE — 99213 OFFICE O/P EST LOW 20 MIN: CPT | Performed by: NURSE PRACTITIONER

## 2021-08-17 RX ORDER — LEVOFLOXACIN 500 MG/1
500 TABLET, FILM COATED ORAL DAILY
Qty: 10 TABLET | Refills: 0 | Status: SHIPPED | OUTPATIENT
Start: 2021-08-17 | End: 2021-08-27

## 2021-08-17 NOTE — PROGRESS NOTES
Please call the patient -COVID is negative, I am going to send an antibiotic broad-spectrum, if no better needs to follow-up.  If worse go to ER.Electronically signed by HERACLIO Armendariz, 08/17/21, 4:02 PM CDT.

## 2021-08-17 NOTE — PROGRESS NOTES
Subjective   Chief Complaint:  Fever, exposure to Covid    History of Present Illness:  This 86 y.o. male was seen in the office today.  Today with fever and exposure to Covid this week.  Reports cough and congestion not feeling well.    No Known Allergies   No current outpatient medications on file prior to visit.     No current facility-administered medications on file prior to visit.      Past Medical, Surgical, Social, and Family History:  Past Medical History:   Diagnosis Date   • BPH (benign prostatic hypertrophy)    • Epididymitis    • Hypertension    • Hypertension, essential    • Mitral valve regurgitation    • Undiagnosed cardiac murmurs    • Weight loss      Past Surgical History:   Procedure Laterality Date   • COLONOSCOPY      Colon Polyp Removal    • FINGER SURGERY Right     tumor removed from a finger     Social History     Socioeconomic History   • Marital status:      Spouse name: Not on file   • Number of children: Not on file   • Years of education: Not on file   • Highest education level: Not on file   Tobacco Use   • Smoking status: Never Smoker   • Smokeless tobacco: Never Used   Substance and Sexual Activity   • Alcohol use: Never   • Drug use: Never   • Sexual activity: Defer     Family History   Problem Relation Age of Onset   • No Known Problems Mother    • Cancer Father    • Cancer Sister    • No Known Problems Brother    • No Known Problems Sister    • No Known Problems Sister    • No Known Problems Sister      Objective   Physical Exam  Constitutional:       General: He is not in acute distress.     Appearance: He is ill-appearing. He is not diaphoretic.   Cardiovascular:      Rate and Rhythm: Normal rate and regular rhythm.   Pulmonary:      Effort: Pulmonary effort is normal.      Breath sounds: Normal breath sounds.   Neurological:      Mental Status: He is alert.     Pulse 98   Temp (!) 104.4 °F (40.2 °C) (Infrared) Comment (Src): re checked several times with same reading   SpO2 96%     Assessment/Plan   Diagnoses and all orders for this visit:    1. Fever, unspecified fever cause (Primary)  -     COVID-19,[facility],NP/OP Swab in Transport Media or ESwab 72 HR TAT - Swab, Nasopharynx; Future    2. Exposure to COVID-19 virus  -     COVID-19,[facility],NP/OP Swab in Transport Media or ESwab 72 HR TAT - Swab, Nasopharynx; Future    Discussion:  Advised and educated plan of care.  There is a high likelihood of Covid although we will obtain a test first to see the next steps.  I advised with the fever 104 ED would not be an unreasonable option.  He declined ED transfer.  I advised Tylenol for the fever, push fluids today and get hydrated.  ED if worse.  If Covid negative will consider broad-spectrum antibiotic as to not get into a sepsis situation with this fever and cough.    Follow-up:  No follow-ups on file.    Electronically signed by HERACLIO Armendariz, 08/17/21, 4:33 PM CDT.

## 2021-11-02 ENCOUNTER — OFFICE VISIT (OUTPATIENT)
Dept: FAMILY MEDICINE CLINIC | Facility: CLINIC | Age: 86
End: 2021-11-02

## 2021-11-02 VITALS
WEIGHT: 117 LBS | DIASTOLIC BLOOD PRESSURE: 70 MMHG | HEIGHT: 70 IN | RESPIRATION RATE: 16 BRPM | SYSTOLIC BLOOD PRESSURE: 112 MMHG | HEART RATE: 76 BPM | OXYGEN SATURATION: 97 % | BODY MASS INDEX: 16.75 KG/M2

## 2021-11-02 DIAGNOSIS — I34.0 NONRHEUMATIC MITRAL VALVE REGURGITATION: Primary | ICD-10-CM

## 2021-11-02 PROCEDURE — 99213 OFFICE O/P EST LOW 20 MIN: CPT | Performed by: FAMILY MEDICINE

## 2021-11-02 NOTE — PROGRESS NOTES
"Subjective   Roman Kenney is a 86 y.o. male.     Chief Complaint   Patient presents with   • Cardiac Valve Problem     nonrheumatic mitral valve regurgitation    6 mo f/u       History of Present Illness     he is being followed by cardiology for his valvular issues--deneis any cp or dyspnea    No current outpatient medications on file.  No Known Allergies    Past Medical History:   Diagnosis Date   • BPH (benign prostatic hypertrophy)    • Epididymitis    • Hypertension    • Hypertension, essential    • Mitral valve regurgitation    • Undiagnosed cardiac murmurs    • Weight loss      Past Surgical History:   Procedure Laterality Date   • COLONOSCOPY      Colon Polyp Removal    • FINGER SURGERY Right     tumor removed from a finger       Review of Systems   Constitutional: Negative.    HENT: Negative.    Eyes: Negative.    Respiratory: Negative.    Cardiovascular: Negative.    Gastrointestinal: Negative.    Endocrine: Negative.    Genitourinary: Negative.    Musculoskeletal: Negative.    Skin: Negative.    Allergic/Immunologic: Negative.    Neurological: Negative.    Hematological: Negative.    Psychiatric/Behavioral: Negative.        Objective  /70   Pulse 76   Resp 16   Ht 176.5 cm (69.5\")   Wt 53.1 kg (117 lb)   SpO2 97%   BMI 17.03 kg/m²   Physical Exam  Vitals and nursing note reviewed.   Constitutional:       Appearance: Normal appearance.   HENT:      Head: Normocephalic and atraumatic.      Nose: Nose normal.      Mouth/Throat:      Mouth: Mucous membranes are moist.   Eyes:      Pupils: Pupils are equal, round, and reactive to light.   Cardiovascular:      Rate and Rhythm: Normal rate and regular rhythm.      Pulses: Normal pulses.      Heart sounds: Murmur heard.       Pulmonary:      Effort: Pulmonary effort is normal.      Breath sounds: Normal breath sounds.   Abdominal:      General: Abdomen is flat. Bowel sounds are normal.      Palpations: Abdomen is soft.   Musculoskeletal:       "   General: Normal range of motion.      Cervical back: Normal range of motion and neck supple.   Skin:     General: Skin is warm.      Capillary Refill: Capillary refill takes less than 2 seconds.   Neurological:      General: No focal deficit present.      Mental Status: He is alert. Mental status is at baseline.   Psychiatric:         Mood and Affect: Mood normal.         Behavior: Behavior normal.         Thought Content: Thought content normal.         Judgment: Judgment normal.         Assessment/Plan   Diagnoses and all orders for this visit:    1. Nonrheumatic mitral valve regurgitation (Primary)    he jackson villeda with his cardilogist            No orders of the defined types were placed in this encounter.      Follow up: 6 month(s)

## 2021-11-11 NOTE — PROGRESS NOTES
Subjective    Mr. Kenney is 86 y.o. male    Chief Complaint: 1 Year follow up for BPH.    History of Present Illness  Benign Prostatic Hypertrophy  Patient complains of lower urinary tract symptoms.  Since he was last seen patient went to [place] and had a whole lap 01/18/2021 he no longer has a catheter has had dramatic improvement in symptoms.  He was last seen 11/16/2020 by Dr. Cha.  He reports nothing. He denies nothing. Patient states symptoms are of no severity. Onset of symptoms was a few years ago and was gradual in onset. His AUA Symptom Score is, 0/35.He reports a history of no complicating symptoms. He denies flank pain, gross hematuria, kidney stones and recurrent UTI.  Patient has tried HoLep 01/2021 with improvement. Last PSA was 4.67 .        The following portions of the patient's history were reviewed and updated as appropriate: allergies, current medications, past family history, past medical history, past social history, past surgical history and problem list.    Review of Systems   Constitutional: Negative for chills and fever.   Gastrointestinal: Negative for abdominal pain, anal bleeding and blood in stool.   Genitourinary: Negative for dysuria, frequency, hematuria and urgency.       No current outpatient medications on file.    Past Medical History:   Diagnosis Date   • BPH (benign prostatic hypertrophy)    • Epididymitis    • Hypertension    • Hypertension, essential    • Mitral valve regurgitation    • Undiagnosed cardiac murmurs    • Weight loss        Past Surgical History:   Procedure Laterality Date   • COLONOSCOPY      Colon Polyp Removal    • FINGER SURGERY Right     tumor removed from a finger       Social History     Socioeconomic History   • Marital status:    Tobacco Use   • Smoking status: Never Smoker   • Smokeless tobacco: Never Used   Substance and Sexual Activity   • Alcohol use: Never   • Drug use: Never   • Sexual activity: Defer       Family History  "  Problem Relation Age of Onset   • No Known Problems Mother    • Cancer Father    • Cancer Sister    • No Known Problems Brother    • No Known Problems Sister    • No Known Problems Sister    • No Known Problems Sister        Objective    Temp 98.3 °F (36.8 °C)   Ht 180.3 cm (71\")   Wt 54.8 kg (120 lb 12.8 oz)   BMI 16.85 kg/m²     Physical Exam  Vitals reviewed.   Constitutional:       Appearance: Normal appearance.   HENT:      Head: Normocephalic and atraumatic.      Right Ear: External ear normal.      Left Ear: External ear normal.   Pulmonary:      Effort: Pulmonary effort is normal.   Genitourinary:     Prostate: Enlarged.      Rectum: Normal.      Comments: Digital rectal around revealed a smooth symmetric prostate nothing suspicious was palpated for prostate cancer.  Neurological:      General: No focal deficit present.      Mental Status: He is alert and oriented to person, place, and time.   Psychiatric:         Mood and Affect: Mood normal.         Behavior: Behavior normal.             Bladder Scan interpretation  Estimation of residual urine via abdominal ultrasound  Residual Urine: 174ml  Indication: Retention  Position: Supine  Examination: Incremental scanning of the suprapubic area using 3 MHz transducer using copious amounts of acoustic gel.   Findings: An anechoic area was demonstrated which represented the bladder, with measurement of residual urine as noted. I inspected this myself. In that the residual urine was stable or insignificant, no treatment will be necessary at this time.     International Prostate Symptom Score  The following is posted based on patient questionnaire answers:  0 - not at all    1-7 mild symptoms  1- Less than one time in five  8-19 moderate symptoms  2 -Less than half the time  20-35 severe symptoms  3 - About half the time  4 - More than half the time  5 - Almost always     For following sections:  Incomplete Emptying: - How often have you had the sensation  of " not emptying your bladder completely after you finished urinating?  0  Frequency: -How often have you had to urinate again less than   two hours after you finished urinating?      0  Intermittency: -How often have you found you stopped and started again  Several times when you urinate?       0  Urgency: -How often do you find it difficult to postpone urination?             0  Weak stream: - How often have you had a weak urinary stream?             0  Straining: - How often have you had to push or strain to begin  Urination?          0  Sleeping: -How many times did you most typically get up to urinate   From the time you went to bed at night until the time you got up in the   0  Morning          Total `  0    Quality of Life  How would you feel if you had to live with your urinary condition the way   1  It is now, no better, no worse for the rest of your life?    Where: 0=delighted; 1= pleased, 2= mostly satisfied, 3= mixed, 4 = mostly  Dissatisfied, 5= Unhappy, 6 = terrible    Assessment and Plan    Diagnoses and all orders for this visit:    1. BPH with obstruction/lower urinary tract symptoms (Primary)  -     Cancel: POC Urinalysis Dipstick, Multipro    2. Retention, urine    Patient no longer catheter dependent he had a HoLep procedure done 01/20/2021 at [place] he has had excellent response denies any symptoms at all he is on no medication.  Follow-up 1 year.

## 2021-11-16 ENCOUNTER — OFFICE VISIT (OUTPATIENT)
Dept: UROLOGY | Facility: CLINIC | Age: 86
End: 2021-11-16

## 2021-11-16 VITALS — WEIGHT: 120.8 LBS | BODY MASS INDEX: 16.91 KG/M2 | TEMPERATURE: 98.3 F | HEIGHT: 71 IN

## 2021-11-16 DIAGNOSIS — R33.9 RETENTION, URINE: ICD-10-CM

## 2021-11-16 DIAGNOSIS — N40.1 BPH WITH OBSTRUCTION/LOWER URINARY TRACT SYMPTOMS: Primary | ICD-10-CM

## 2021-11-16 DIAGNOSIS — N13.8 BPH WITH OBSTRUCTION/LOWER URINARY TRACT SYMPTOMS: Primary | ICD-10-CM

## 2021-11-16 PROCEDURE — 51798 US URINE CAPACITY MEASURE: CPT | Performed by: PHYSICIAN ASSISTANT

## 2021-11-16 PROCEDURE — 99213 OFFICE O/P EST LOW 20 MIN: CPT | Performed by: PHYSICIAN ASSISTANT

## 2022-05-03 ENCOUNTER — OFFICE VISIT (OUTPATIENT)
Dept: FAMILY MEDICINE CLINIC | Facility: CLINIC | Age: 87
End: 2022-05-03

## 2022-05-03 VITALS
HEIGHT: 71 IN | BODY MASS INDEX: 17.08 KG/M2 | OXYGEN SATURATION: 99 % | RESPIRATION RATE: 16 BRPM | SYSTOLIC BLOOD PRESSURE: 138 MMHG | WEIGHT: 122 LBS | DIASTOLIC BLOOD PRESSURE: 78 MMHG | HEART RATE: 94 BPM

## 2022-05-03 DIAGNOSIS — I34.0 NONRHEUMATIC MITRAL VALVE REGURGITATION: Primary | ICD-10-CM

## 2022-05-03 DIAGNOSIS — R07.9 CHEST PAIN, UNSPECIFIED TYPE: ICD-10-CM

## 2022-05-03 PROCEDURE — 99212 OFFICE O/P EST SF 10 MIN: CPT | Performed by: FAMILY MEDICINE

## 2022-05-03 NOTE — PROGRESS NOTES
"Subjective   Roman Kenney is a 87 y.o. male.     Chief Complaint   Patient presents with   • Mitral Valve Prolapse   • Follow-up     Mitral valve regurg     6 mo f/u       History of Present Illness     he will see [facility] regarding  his mitral valve regurg in sept---denies any cp or syncoipe..    No current outpatient medications on file.  No Known Allergies    BMI is below normal parameters (malnutrition). Recommendations: treating the underlying disease process      Past Medical History:   Diagnosis Date   • BPH (benign prostatic hypertrophy)    • Epididymitis    • Hypertension    • Hypertension, essential    • Mitral valve regurgitation    • Undiagnosed cardiac murmurs    • Weight loss      Past Surgical History:   Procedure Laterality Date   • COLONOSCOPY      Colon Polyp Removal    • FINGER SURGERY Right     tumor removed from a finger       Review of Systems   Constitutional: Negative.    HENT: Negative.    Eyes: Negative.    Respiratory: Negative.    Cardiovascular: Negative.    Gastrointestinal: Negative.    Endocrine: Negative.    Genitourinary: Negative.    Musculoskeletal: Negative.    Skin: Negative.    Allergic/Immunologic: Negative.    Neurological: Negative.    Hematological: Negative.    Psychiatric/Behavioral: Negative.        Objective  /78   Pulse 94   Resp 16   Ht 180.3 cm (71\")   Wt 55.3 kg (122 lb)   SpO2 99%   BMI 17.02 kg/m²   Physical Exam  Vitals and nursing note reviewed.   Constitutional:       Appearance: Normal appearance. He is normal weight.   HENT:      Head: Normocephalic and atraumatic.      Nose: Nose normal.      Mouth/Throat:      Mouth: Mucous membranes are moist.   Eyes:      Extraocular Movements: Extraocular movements intact.      Conjunctiva/sclera: Conjunctivae normal.      Pupils: Pupils are equal, round, and reactive to light.   Cardiovascular:      Rate and Rhythm: Normal rate and regular rhythm.      Pulses: Normal pulses.      Heart sounds: " Murmur heard.   Pulmonary:      Effort: Pulmonary effort is normal.      Breath sounds: Normal breath sounds.   Abdominal:      General: Abdomen is flat. Bowel sounds are normal.   Musculoskeletal:         General: Normal range of motion.      Cervical back: Normal range of motion.   Skin:     General: Skin is warm.      Capillary Refill: Capillary refill takes less than 2 seconds.   Neurological:      General: No focal deficit present.      Mental Status: He is alert and oriented to person, place, and time. Mental status is at baseline.   Psychiatric:         Mood and Affect: Mood normal.         Assessment/Plan   Diagnoses and all orders for this visit:    1. Nonrheumatic mitral valve regurgitation (Primary)  -     CBC & Differential  -     Comprehensive metabolic panel  -     Lipid Panel With / Chol / HDL Ratio    2. Chest pain, unspecified type   -     Lipid Panel With / Chol / HDL Ratio                 Orders Placed This Encounter   Procedures   • Comprehensive metabolic panel     Order Specific Question:   Release to patient     Answer:   Immediate   • Lipid Panel With / Chol / HDL Ratio     Order Specific Question:   Release to patient     Answer:   Immediate   • CBC & Differential       Follow up: 6 month(s)

## 2022-05-04 LAB
ALBUMIN SERPL-MCNC: 4.6 G/DL (ref 3.5–5.2)
ALBUMIN/GLOB SERPL: 1.7 G/DL
ALP SERPL-CCNC: 131 U/L (ref 39–117)
ALT SERPL-CCNC: 14 U/L (ref 1–41)
AST SERPL-CCNC: 23 U/L (ref 1–40)
BASOPHILS # BLD AUTO: 0.04 10*3/MM3 (ref 0–0.2)
BASOPHILS NFR BLD AUTO: 0.8 % (ref 0–1.5)
BILIRUB SERPL-MCNC: 0.6 MG/DL (ref 0–1.2)
BUN SERPL-MCNC: 20 MG/DL (ref 8–23)
BUN/CREAT SERPL: 15.2 (ref 7–25)
CALCIUM SERPL-MCNC: 10 MG/DL (ref 8.6–10.5)
CHLORIDE SERPL-SCNC: 105 MMOL/L (ref 98–107)
CHOLEST SERPL-MCNC: 217 MG/DL (ref 0–200)
CHOLEST/HDLC SERPL: 3.14 {RATIO}
CO2 SERPL-SCNC: 24.9 MMOL/L (ref 22–29)
CREAT SERPL-MCNC: 1.32 MG/DL (ref 0.76–1.27)
EGFRCR SERPLBLD CKD-EPI 2021: 52.2 ML/MIN/1.73
EOSINOPHIL # BLD AUTO: 0.11 10*3/MM3 (ref 0–0.4)
EOSINOPHIL NFR BLD AUTO: 2.1 % (ref 0.3–6.2)
ERYTHROCYTE [DISTWIDTH] IN BLOOD BY AUTOMATED COUNT: 13.9 % (ref 12.3–15.4)
GLOBULIN SER CALC-MCNC: 2.7 GM/DL
GLUCOSE SERPL-MCNC: 99 MG/DL (ref 65–99)
HCT VFR BLD AUTO: 41 % (ref 37.5–51)
HDLC SERPL-MCNC: 69 MG/DL (ref 40–60)
HGB BLD-MCNC: 13.9 G/DL (ref 13–17.7)
IMM GRANULOCYTES # BLD AUTO: 0.02 10*3/MM3 (ref 0–0.05)
IMM GRANULOCYTES NFR BLD AUTO: 0.4 % (ref 0–0.5)
LDLC SERPL CALC-MCNC: 134 MG/DL (ref 0–100)
LYMPHOCYTES # BLD AUTO: 1.93 10*3/MM3 (ref 0.7–3.1)
LYMPHOCYTES NFR BLD AUTO: 37.7 % (ref 19.6–45.3)
MCH RBC QN AUTO: 29.7 PG (ref 26.6–33)
MCHC RBC AUTO-ENTMCNC: 33.9 G/DL (ref 31.5–35.7)
MCV RBC AUTO: 87.6 FL (ref 79–97)
MONOCYTES # BLD AUTO: 0.44 10*3/MM3 (ref 0.1–0.9)
MONOCYTES NFR BLD AUTO: 8.6 % (ref 5–12)
NEUTROPHILS # BLD AUTO: 2.58 10*3/MM3 (ref 1.7–7)
NEUTROPHILS NFR BLD AUTO: 50.4 % (ref 42.7–76)
NRBC BLD AUTO-RTO: 0 /100 WBC (ref 0–0.2)
PLATELET # BLD AUTO: 184 10*3/MM3 (ref 140–450)
POTASSIUM SERPL-SCNC: 4.4 MMOL/L (ref 3.5–5.2)
PROT SERPL-MCNC: 7.3 G/DL (ref 6–8.5)
RBC # BLD AUTO: 4.68 10*6/MM3 (ref 4.14–5.8)
SODIUM SERPL-SCNC: 142 MMOL/L (ref 136–145)
TRIGL SERPL-MCNC: 80 MG/DL (ref 0–150)
VLDLC SERPL CALC-MCNC: 14 MG/DL (ref 5–40)
WBC # BLD AUTO: 5.12 10*3/MM3 (ref 3.4–10.8)

## 2022-11-03 ENCOUNTER — OFFICE VISIT (OUTPATIENT)
Dept: FAMILY MEDICINE CLINIC | Facility: CLINIC | Age: 87
End: 2022-11-03

## 2022-11-03 VITALS
OXYGEN SATURATION: 98 % | DIASTOLIC BLOOD PRESSURE: 72 MMHG | HEIGHT: 71 IN | WEIGHT: 115 LBS | SYSTOLIC BLOOD PRESSURE: 120 MMHG | BODY MASS INDEX: 16.1 KG/M2 | HEART RATE: 71 BPM

## 2022-11-03 DIAGNOSIS — I34.0 NONRHEUMATIC MITRAL VALVE REGURGITATION: ICD-10-CM

## 2022-11-03 DIAGNOSIS — N40.0 BENIGN PROSTATIC HYPERPLASIA WITHOUT LOWER URINARY TRACT SYMPTOMS: Primary | ICD-10-CM

## 2022-11-03 PROCEDURE — 1160F RVW MEDS BY RX/DR IN RCRD: CPT | Performed by: FAMILY MEDICINE

## 2022-11-03 PROCEDURE — G0439 PPPS, SUBSEQ VISIT: HCPCS | Performed by: FAMILY MEDICINE

## 2022-11-03 PROCEDURE — 1170F FXNL STATUS ASSESSED: CPT | Performed by: FAMILY MEDICINE

## 2022-11-03 NOTE — PROGRESS NOTES
The ABCs of the Annual Wellness Visit  Subsequent Medicare Wellness Visit    Chief Complaint   Patient presents with   • Medicare Wellness-subsequent      Subjective    History of Present Illness:  Roman Kenney is a 87 y.o. male who presents for a Subsequent Medicare Wellness Visit.    The following portions of the patient's history were reviewed and   updated as appropriate: allergies, current medications, past family history, past medical history, past social history, past surgical history and problem list.    Compared to one year ago, the patient feels his physical   health is the same.    Compared to one year ago, the patient feels his mental   health is the same.    Recent Hospitalizations:  He was not admitted to the hospital during the last year.       Current Medical Providers:  Patient Care Team:  Yariel Hancock MD as PCP - General (Family Medicine)  Yariel Hancock MD as PCP - Family Medicine  Joseph Cha MD as Consulting Physician (Urology)    No outpatient medications prior to visit.     No facility-administered medications prior to visit.       No opioid medication identified on active medication list. I have reviewed chart for other potential  high risk medication/s and harmful drug interactions in the elderly.          Aspirin is not on active medication list.  Aspirin use is not indicated based on review of current medical condition/s. Risk of harm outweighs potential benefits.  .    Patient Active Problem List   Diagnosis   • Mitral valve regurgitation   • Mitral valve prolapse   • Weight loss   • Patient underweight   • Testicle pain   • Right lower quadrant abdominal pain   • Epididymitis   • Herpes zoster without complication   • Urinary retention   • Benign prostatic hyperplasia without lower urinary tract symptoms     Advance Care Planning  Advance Directive is not on file.  ACP discussion was held with the patient during this visit. Patient does not have an  "advance directive, information provided.          Objective    Vitals:    11/03/22 0915   BP: 120/72   Pulse: 71   SpO2: 98%   Weight: 52.2 kg (115 lb)   Height: 180.3 cm (71\")     Estimated body mass index is 16.04 kg/m² as calculated from the following:    Height as of this encounter: 180.3 cm (71\").    Weight as of this encounter: 52.2 kg (115 lb).    BMI is below normal parameters (malnutrition). Recommendations: referral to primary care      Does the patient have evidence of cognitive impairment? No    Physical Exam            HEALTH RISK ASSESSMENT    Smoking Status:  Social History     Tobacco Use   Smoking Status Never   Smokeless Tobacco Never     Alcohol Consumption:  Social History     Substance and Sexual Activity   Alcohol Use Never     Fall Risk Screen:    MARISABEL Fall Risk Assessment was completed, and patient is at LOW risk for falls.Assessment completed on:11/3/2022    Depression Screening:  PHQ-2/PHQ-9 Depression Screening 11/3/2022   Retired PHQ-9 Total Score -   Retired Total Score -   Little Interest or Pleasure in Doing Things 0-->not at all   Feeling Down, Depressed or Hopeless 0-->not at all   PHQ-9: Brief Depression Severity Measure Score 0       Health Habits and Functional and Cognitive Screening:  Functional & Cognitive Status 11/3/2022   Do you have difficulty preparing food and eating? No   Do you have difficulty bathing yourself, getting dressed or grooming yourself? No   Do you have difficulty using the toilet? No   Do you have difficulty moving around from place to place? No   Do you have trouble with steps or getting out of a bed or a chair? No   Current Diet Well Balanced Diet   Dental Exam Up to date   Eye Exam Up to date   Exercise (times per week) 3 times per week   Current Exercises Include Walking   Current Exercise Activities Include -   Do you need help using the phone?  No   Are you deaf or do you have serious difficulty hearing?  No   Do you need help with transportation? " No   Do you need help shopping? No   Do you need help preparing meals?  No   Do you need help with housework?  No   Do you need help with laundry? No   Do you need help taking your medications? No   Do you need help managing money? No   Do you ever drive or ride in a car without wearing a seat belt? No   Have you felt unusual stress, anger or loneliness in the last month? No   Who do you live with? Spouse   If you need help, do you have trouble finding someone available to you? No   Have you been bothered in the last four weeks by sexual problems? No   Do you have difficulty concentrating, remembering or making decisions? No       Age-appropriate Screening Schedule:  Refer to the list below for future screening recommendations based on patient's age, sex and/or medical conditions. Orders for these recommended tests are listed in the plan section. The patient has been provided with a written plan.    Health Maintenance   Topic Date Due   • TDAP/TD VACCINES (1 - Tdap) Never done   • ZOSTER VACCINE (1 of 2) Never done   • INFLUENZA VACCINE  03/31/2023 (Originally 8/1/2022)              Assessment & Plan   CMS Preventative Services Quick Reference  Risk Factors Identified During Encounter  Cardiovascular Disease  The above risks/problems have been discussed with the patient.  Follow up actions/plans if indicated are seen below in the Assessment/Plan Section.  Pertinent information has been shared with the patient in the After Visit Summary.    Diagnoses and all orders for this visit:    1. Benign prostatic hyperplasia without lower urinary tract symptoms (Primary)        Follow Up:   No follow-ups on file.     An After Visit Summary and PPPS were made available to the patient.          I spent 6  minutes caring for Roman on this date of service. This time includes time spent by me in the following activities:reviewing tests, obtaining and/or reviewing a separately obtained history, ordering medications, tests, or  procedures, documenting information in the medical record, independently interpreting results and communicating that information with the patient/family/caregiver and care coordination

## 2022-11-03 NOTE — PROGRESS NOTES
"Subjective   Roman Kenney is a 87 y.o. male.     Chief Complaint   Patient presents with   • Medicare Wellness-subsequent        History of Present Illness     overall feels good    No current outpatient medications on file.  No Known Allergies    BMI is below normal parameters (malnutrition). Recommendations: none (medical contraindication)      Past Medical History:   Diagnosis Date   • BPH (benign prostatic hypertrophy)    • Epididymitis    • Hypertension    • Hypertension, essential    • Mitral valve regurgitation    • Undiagnosed cardiac murmurs    • Weight loss      Past Surgical History:   Procedure Laterality Date   • COLONOSCOPY      Colon Polyp Removal    • FINGER SURGERY Right     tumor removed from a finger       Review of Systems   Constitutional: Negative.    HENT: Negative.    Eyes: Negative.    Respiratory: Negative.    Cardiovascular: Negative.    Gastrointestinal: Negative.    Endocrine: Negative.    Genitourinary: Negative.    Musculoskeletal: Negative.    Skin: Negative.    Allergic/Immunologic: Negative.    Neurological: Negative.    Hematological: Negative.    Psychiatric/Behavioral: Negative.        Objective  /72   Pulse 71   Ht 180.3 cm (71\")   Wt 52.2 kg (115 lb)   SpO2 98%   BMI 16.04 kg/m²   Physical Exam  Vitals and nursing note reviewed.   Constitutional:       Appearance: Normal appearance. He is normal weight.   HENT:      Head: Normocephalic and atraumatic.      Nose: Nose normal.      Mouth/Throat:      Mouth: Mucous membranes are moist.   Eyes:      Extraocular Movements: Extraocular movements intact.      Pupils: Pupils are equal, round, and reactive to light.   Cardiovascular:      Rate and Rhythm: Normal rate and regular rhythm.      Pulses: Normal pulses.      Heart sounds: Normal heart sounds.   Pulmonary:      Effort: Pulmonary effort is normal.      Breath sounds: Normal breath sounds.   Abdominal:      General: Abdomen is flat. Bowel sounds are normal.    "   Palpations: Abdomen is soft.   Musculoskeletal:         General: Normal range of motion.      Cervical back: Normal range of motion and neck supple.   Skin:     General: Skin is warm and dry.      Capillary Refill: Capillary refill takes less than 2 seconds.   Neurological:      General: No focal deficit present.      Mental Status: He is alert and oriented to person, place, and time. Mental status is at baseline.   Psychiatric:         Mood and Affect: Mood normal.         Assessment & Plan   Diagnoses and all orders for this visit:    1. Benign prostatic hyperplasia without lower urinary tract symptoms (Primary)                 No orders of the defined types were placed in this encounter.      Follow up: 12 month(s)

## 2022-11-09 ENCOUNTER — OFFICE VISIT (OUTPATIENT)
Dept: CARDIOLOGY | Facility: CLINIC | Age: 87
End: 2022-11-09

## 2022-11-09 VITALS
BODY MASS INDEX: 16.6 KG/M2 | HEART RATE: 78 BPM | OXYGEN SATURATION: 100 % | DIASTOLIC BLOOD PRESSURE: 72 MMHG | WEIGHT: 118.6 LBS | HEIGHT: 71 IN | SYSTOLIC BLOOD PRESSURE: 148 MMHG

## 2022-11-09 DIAGNOSIS — R63.6 PATIENT UNDERWEIGHT: ICD-10-CM

## 2022-11-09 DIAGNOSIS — I34.0 NONRHEUMATIC MITRAL VALVE REGURGITATION: Primary | ICD-10-CM

## 2022-11-09 DIAGNOSIS — I34.1 MITRAL VALVE PROLAPSE: ICD-10-CM

## 2022-11-09 PROCEDURE — 99214 OFFICE O/P EST MOD 30 MIN: CPT | Performed by: NURSE PRACTITIONER

## 2022-11-09 PROCEDURE — 93000 ELECTROCARDIOGRAM COMPLETE: CPT | Performed by: NURSE PRACTITIONER

## 2022-11-09 NOTE — PROGRESS NOTES
"    Subjective:     Encounter Date:11/09/2022      Patient ID: Roman Kenney is a 87 y.o. male .    Chief Complaint: \"no complaints\"  Heart Murmur  This is a chronic problem. The current episode started more than 1 year ago. The problem occurs daily. The problem has been unchanged. Pertinent negatives include no chest pain, coughing, fatigue, rash or weakness.   Hypertension  This is a chronic problem. The current episode started more than 1 year ago. The problem has been rapidly improving since onset. The problem is controlled. Pertinent negatives include no chest pain, malaise/fatigue, orthopnea, palpitations, peripheral edema, PND or shortness of breath.     Patient presents today for a routine follow up. Patient is followed for mitral valve prolapse resulting in mitral valve regurgitation. He has been seen by Dr. Hermes CTS, in the past and has declined intervention due to lack of symptoms. He has not been seen in our office since 8/2020. His last echo in 4/2020 revealed severe prolapse of the posterior mitral valve leaflet resulting in severe anteriorly directed mitral regurgitation, severely dilated left atrial cavity and grade 3 diastolic dysfunction.     He reports he has been well. He denies complaints including chest pain, dyspnea, palpitations, orthopnea, edema, and PND.     The following portions of the patient's history were reviewed and updated as appropriate: allergies, current medications, past family history, past medical history, past social history, past surgical history and problem list.    No Known Allergies  No current outpatient medications on file.  Past Medical History:   Diagnosis Date   • BPH (benign prostatic hypertrophy)    • Epididymitis    • Hypertension    • Hypertension, essential    • Mitral valve regurgitation    • Undiagnosed cardiac murmurs    • Weight loss        Social History     Socioeconomic History   • Marital status:    Tobacco Use   • Smoking status: Never "   • Smokeless tobacco: Never   Vaping Use   • Vaping Use: Never used   Substance and Sexual Activity   • Alcohol use: Never   • Drug use: Never   • Sexual activity: Defer       Review of Systems   Constitutional: Negative for fatigue, malaise/fatigue, weight gain and weight loss.   Cardiovascular: Negative for chest pain, dyspnea on exertion, irregular heartbeat, leg swelling, near-syncope, orthopnea, palpitations, paroxysmal nocturnal dyspnea and syncope.   Respiratory: Negative for cough, shortness of breath, sleep disturbances due to breathing, sputum production and wheezing.    Skin: Negative for dry skin, flushing, itching and rash.   Gastrointestinal: Negative for hematemesis and hematochezia.   Neurological: Negative for dizziness, light-headedness, loss of balance and weakness.   All other systems reviewed and are negative.         Objective:     Vitals reviewed.   Constitutional:       General: Not in acute distress.     Appearance: Well-developed. Cachectic and frail. Not diaphoretic.   Eyes:      General: No scleral icterus.     Conjunctiva/sclera: Conjunctivae normal.      Pupils: Pupils are equal, round, and reactive to light.   HENT:      Head: Normocephalic.    Mouth/Throat:      Pharynx: No oropharyngeal exudate.   Neck:      Vascular: No JVR.   Pulmonary:      Effort: Pulmonary effort is normal. No respiratory distress.      Breath sounds: Normal breath sounds. No wheezing. No rhonchi. No rales.   Chest:      Chest wall: Not tender to palpatation.   Cardiovascular:      Normal rate. Regular rhythm.      Murmurs: There is a grade 5/6 high frequency blowing holosystolic murmur at the apex.   Pulses:     Intact distal pulses.   Edema:     Peripheral edema absent.   Abdominal:      General: Bowel sounds are normal. There is no distension.      Palpations: Abdomen is soft.      Tenderness: There is no abdominal tenderness.   Musculoskeletal: Normal range of motion.      Cervical back: Normal range of  "motion and neck supple. Skin:     General: Skin is warm and dry.      Coloration: Skin is not pale.      Findings: No erythema or rash.   Neurological:      Mental Status: Alert, oriented to person, place, and time and oriented to person, place and time.      Deep Tendon Reflexes: Reflexes are normal and symmetric.   Psychiatric:         Behavior: Behavior normal.         ECG 12 Lead    Date/Time: 11/9/2022 12:55 PM  Performed by: Lissette Lizama APRN  Authorized by: Lissette Lizama APRN   Comparison: compared with previous ECG from 12/30/2020  Similar to previous ECG  Rhythm: sinus rhythm  Rate: normal  BPM: 78  Conduction: conduction normal  QRS axis: right  Other findings: T wave abnormality and left ventricular hypertrophy  Other findings comments: peaked T-waves in V3 & V4    Clinical impression: abnormal EKG          /72 (BP Location: Left arm, Patient Position: Sitting, Cuff Size: Adult)   Pulse 78   Ht 180.3 cm (71\")   Wt 53.8 kg (118 lb 9.6 oz)   SpO2 100%   BMI 16.54 kg/m²     Lab Review:   I have reviewed previous office notes, office notes from CTS and recent labs.     Lab Results   Component Value Date    CHLPL 217 (H) 05/03/2022    TRIG 80 05/03/2022    HDL 69 (H) 05/03/2022     (H) 05/03/2022     Results for orders placed during the hospital encounter of 04/01/20    Adult Transthoracic Echo Complete W/ Cont if Necessary Per Protocol    Interpretation Summary  · There is severe prolapse of the posterior mitral valve leaflet present, resulting in severe, anteriorly directed mitral regurgitation.  · Normal LVEF, though EF now <60%.  · Left ventricular diastolic dysfunction (grade III) consistent with reversible restrictive pattern.  · Left atrial cavity size is severely dilated.  · Normal size and function of right ventricle.  · Estimated RVSP, by TR jet visualized (may not fully reflect true value) is 35-45mmHg.          Assessment:          Diagnosis Plan   1. Nonrheumatic mitral " valve regurgitation  Adult Transthoracic Echo Complete w/ Color, Spectral and Contrast if necessary per protocol      2. Mitral valve prolapse        3. Patient underweight               Plan:       1. Mitral regurgitaiton- due to MVP. Patient is agreeable to recheck a 2D echo but continues to decline intervention as he has no symptoms. Will recheck echo and continue to monitor. Reviewed signs and symptoms of CHF and what to report with the patient. Patient instructed to restrict sodium and weigh daily. Report weight gain of greater than 2 lbs overnight or 5 lbs in 1 week. Pt verbalized understanding of instructions and plan of care.   2. MVP- severe on echo in 2020. Will recheck echo.   3. Underweight- Patient's Body mass index is 16.54 kg/m². BMI is below normal parameters. Recommendations include: referral to primary care.        Follow up in 6 months or sooner if symptoms worsen.     I spent 30 minutes caring for Roman on this date of service. This time includes time spent by me in the following activities:preparing for the visit, reviewing tests, obtaining and/or reviewing a separately obtained history, performing a medically appropriate examination and/or evaluation , counseling and educating the patient/family/caregiver, referring and communicating with other health care professionals , documenting information in the medical record, independently interpreting results and communicating that information with the patient/family/caregiver and care coordination

## 2022-12-13 ENCOUNTER — APPOINTMENT (OUTPATIENT)
Dept: CARDIOLOGY | Facility: HOSPITAL | Age: 87
End: 2022-12-13

## 2023-05-15 ENCOUNTER — OFFICE VISIT (OUTPATIENT)
Dept: CARDIOLOGY | Facility: CLINIC | Age: 88
End: 2023-05-15
Payer: COMMERCIAL

## 2023-05-15 VITALS
HEIGHT: 71 IN | WEIGHT: 114 LBS | DIASTOLIC BLOOD PRESSURE: 78 MMHG | HEART RATE: 78 BPM | BODY MASS INDEX: 15.96 KG/M2 | SYSTOLIC BLOOD PRESSURE: 140 MMHG | OXYGEN SATURATION: 100 %

## 2023-05-15 DIAGNOSIS — I34.1 MITRAL VALVE PROLAPSE: Primary | ICD-10-CM

## 2023-05-15 DIAGNOSIS — I34.0 NONRHEUMATIC MITRAL VALVE REGURGITATION: ICD-10-CM

## 2023-05-15 NOTE — PROGRESS NOTES
Referring Provider: Yariel Hancock MD    Reason for Follow-up Visit: MVP    Subjective .   Chief Complaint:   Chief Complaint   Patient presents with   • Heart Problem     Non rheumatic mitral valve regurgitation 6 month fu.  Pt no showed the echo back in Dec.   • Hyperlipidemia     Last lab done 5/2022 LDL was 134 on no medication.       History of present illness:  Roman Kenney is a 88 y.o. yo male with chronic MR due to MVP. He is asymptomatic       History  Past Medical History:   Diagnosis Date   • BPH (benign prostatic hypertrophy)    • Epididymitis    • Hypertension    • Hypertension, essential    • Mitral valve regurgitation    • Undiagnosed cardiac murmurs    • Weight loss    ,   Past Surgical History:   Procedure Laterality Date   • COLONOSCOPY      Colon Polyp Removal    • FINGER SURGERY Right     tumor removed from a finger   ,   Family History   Problem Relation Age of Onset   • No Known Problems Mother    • Cancer Father    • Cancer Sister    • No Known Problems Sister    • No Known Problems Sister    • No Known Problems Sister    • No Known Problems Brother    ,   Social History     Tobacco Use   • Smoking status: Never   • Smokeless tobacco: Never   Vaping Use   • Vaping Use: Never used   Substance Use Topics   • Alcohol use: Never   • Drug use: Never   ,     Medications  No current outpatient medications on file.     No current facility-administered medications for this visit.       Allergies:  Patient has no known allergies.    Review of Systems  Review of Systems   HENT: Negative for nosebleeds.    Cardiovascular: Positive for dyspnea on exertion. Negative for chest pain, claudication, leg swelling, near-syncope, orthopnea, palpitations, paroxysmal nocturnal dyspnea and syncope.   Respiratory: Negative for hemoptysis and shortness of breath.    Gastrointestinal: Negative for melena.   Genitourinary: Negative for hematuria.       Objective     Physical Exam:  /78   Pulse 78   " Ht 180.3 cm (71\")   Wt 51.7 kg (114 lb)   SpO2 100%   BMI 15.90 kg/m²   Pulmonary:      Effort: Pulmonary effort is normal.      Breath sounds: Normal breath sounds.   Cardiovascular:      Normal rate. Regularly irregular rhythm.      Murmurs: There is a high frequency blowing holosystolic murmur at the apex.   Edema:     Peripheral edema absent.         Results Review:    ECG 12 Lead    Date/Time: 5/15/2023 12:08 PM  Performed by: Scott Burleson MD  Authorized by: Scott Burleson MD   Comparison: compared with previous ECG   Similar to previous ECG  Rhythm: sinus rhythm  Rate: normal  Conduction: conduction normal  QRS axis: normal  Other findings: left ventricular hypertrophy with strain    Clinical impression: abnormal EKG            Office Visit on 05/03/2022   Component Date Value Ref Range Status   • WBC 05/03/2022 5.12  3.40 - 10.80 10*3/mm3 Final   • RBC 05/03/2022 4.68  4.14 - 5.80 10*6/mm3 Final   • Hemoglobin 05/03/2022 13.9  13.0 - 17.7 g/dL Final   • Hematocrit 05/03/2022 41.0  37.5 - 51.0 % Final   • MCV 05/03/2022 87.6  79.0 - 97.0 fL Final   • MCH 05/03/2022 29.7  26.6 - 33.0 pg Final   • MCHC 05/03/2022 33.9  31.5 - 35.7 g/dL Final   • RDW 05/03/2022 13.9  12.3 - 15.4 % Final   • Platelets 05/03/2022 184  140 - 450 10*3/mm3 Final   • Neutrophil Rel % 05/03/2022 50.4  42.7 - 76.0 % Final   • Lymphocyte Rel % 05/03/2022 37.7  19.6 - 45.3 % Final   • Monocyte Rel % 05/03/2022 8.6  5.0 - 12.0 % Final   • Eosinophil Rel % 05/03/2022 2.1  0.3 - 6.2 % Final   • Basophil Rel % 05/03/2022 0.8  0.0 - 1.5 % Final   • Neutrophils Absolute 05/03/2022 2.58  1.70 - 7.00 10*3/mm3 Final   • Lymphocytes Absolute 05/03/2022 1.93  0.70 - 3.10 10*3/mm3 Final   • Monocytes Absolute 05/03/2022 0.44  0.10 - 0.90 10*3/mm3 Final   • Eosinophils Absolute 05/03/2022 0.11  0.00 - 0.40 10*3/mm3 Final   • Basophils Absolute 05/03/2022 0.04  0.00 - 0.20 10*3/mm3 Final   • Immature Granulocyte Rel % 05/03/2022 0.4  0.0 - 0.5 % " Final   • Immature Grans Absolute 05/03/2022 0.02  0.00 - 0.05 10*3/mm3 Final   • nRBC 05/03/2022 0.0  0.0 - 0.2 /100 WBC Final   • Glucose 05/03/2022 99  65 - 99 mg/dL Final   • BUN 05/03/2022 20  8 - 23 mg/dL Final   • Creatinine 05/03/2022 1.32 (H)  0.76 - 1.27 mg/dL Final   • EGFR Result 05/03/2022 52.2 (L)  >60.0 mL/min/1.73 Final    Comment: National Kidney Foundation and American Society of  Nephrology (ASN) Task Force recommended calculation based  on the Chronic Kidney Disease Epidemiology Collaboration  (CKD-EPI) equation refit without adjustment for race.  The GFR formula is only valid for adults with stable renal  function between ages 18 and 70.     • BUN/Creatinine Ratio 05/03/2022 15.2  7.0 - 25.0 Final   • Sodium 05/03/2022 142  136 - 145 mmol/L Final   • Potassium 05/03/2022 4.4  3.5 - 5.2 mmol/L Final    Comment: Slight hemolysis detected by analyzer. Results may be  affected.     • Chloride 05/03/2022 105  98 - 107 mmol/L Final   • Total CO2 05/03/2022 24.9  22.0 - 29.0 mmol/L Final   • Calcium 05/03/2022 10.0  8.6 - 10.5 mg/dL Final   • Total Protein 05/03/2022 7.3  6.0 - 8.5 g/dL Final   • Albumin 05/03/2022 4.60  3.50 - 5.20 g/dL Final   • Globulin 05/03/2022 2.7  gm/dL Final   • A/G Ratio 05/03/2022 1.7  g/dL Final   • Total Bilirubin 05/03/2022 0.6  0.0 - 1.2 mg/dL Final   • Alkaline Phosphatase 05/03/2022 131 (H)  39 - 117 U/L Final   • AST (SGOT) 05/03/2022 23  1 - 40 U/L Final   • ALT (SGPT) 05/03/2022 14  1 - 41 U/L Final   • Total Cholesterol 05/03/2022 217 (H)  0 - 200 mg/dL Final    Comment: Cholesterol Reference Ranges  (U.S. Department of Health and Human Services ATP III  Classifications)  Desirable          <200 mg/dL  Borderline High    200-239 mg/dL  High Risk          >240 mg/dL  Triglyceride Reference Ranges  (U.S. Department of Health and Human Services ATP III  Classifications)  Normal           <150 mg/dL  Borderline High  150-199 mg/dL  High             200-499 mg/dL  Very  High        >500 mg/dL  HDL Reference Ranges  (U.S. Department of Health and Human Services ATP III  Classifications)  Low     <40 mg/dl (major risk factor for CHD)  High    >60 mg/dl ('negative' risk factor for CHD)  LDL Reference Ranges  (U.S. Department of Health and Human Services ATP III  Classifications)  Optimal          <100 mg/dL  Near Optimal     100-129 mg/dL  Borderline High  130-159 mg/dL  High             160-189 mg/dL  Very High        >189 mg/dL     • Triglycerides 05/03/2022 80  0 - 150 mg/dL Final   • HDL Cholesterol 05/03/2022 69 (H)  40 - 60 mg/dL Final   • VLDL Cholesterol Jaspal 05/03/2022 14  5 - 40 mg/dL Final   • LDL Chol Calc (NIH) 05/03/2022 134 (H)  0 - 100 mg/dL Final   • Chol/HDL Ratio 05/03/2022 3.14   Final       Assessment & Plan   Problem List Items Addressed This Visit        Cardiac and Vasculature    Mitral valve regurgitation    Current Assessment & Plan     Severe due to MVP. Needs a repeat echo         Mitral valve prolapse - Primary    Current Assessment & Plan     Chronic. Needs a repeat echo            Medical Complexity  must have 2 out of 3     Moderate Complexity Level 4           1 of the following medical problems:          []One chronic illness with mild exacerbation         [x]Two or more stable chronic illness          []One new problem  []One acute illness with systemic symptoms    Complexity of Data  Reviewed (1 out of the 3 following categories)      Category 1 tests, documents, historian (must have 3 points)       []Review of prior external records  [x]Review of results of unique tests  [x]Ordering unique tests  []Assessment requires an independent historian    Category 2 Interpretation of tests   []Independent interpretation of test read by another doc    Category 3 Discuss Management/tests  []Discussion with external physician    Risk of complications and/or morbidity          [x]Prescription Drug Management

## 2023-05-15 NOTE — LETTER
May 15, 2023     Yariel Hancock MD  [address] [facility] [address]    Patient: Roman Kenney   YOB: 1935   Date of Visit: 5/15/2023       Dear Dr. Santi MD:    Thank you for referring Roman Kenney to me for evaluation. Below are the relevant portions of my assessment and plan of care.    If you have questions, please do not hesitate to call me. I look forward to following Roman along with you.         Sincerely,        Scott Burleson MD        CC: No Recipients    Scott Burleson MD  05/15/23 1210  Sign when Signing Visit  Referring Provider: Yariel Hancock MD    Reason for Follow-up Visit: MVP    Subjective .   Chief Complaint:   Chief Complaint   Patient presents with   • Heart Problem     Non rheumatic mitral valve regurgitation 6 month fu.  Pt no showed the echo back in Dec.   • Hyperlipidemia     Last lab done 5/2022 LDL was 134 on no medication.       History of present illness:  Roman Kenney is a 88 y.o. yo male with chronic MR due to MVP. He is asymptomatic       History  Past Medical History:   Diagnosis Date   • BPH (benign prostatic hypertrophy)    • Epididymitis    • Hypertension    • Hypertension, essential    • Mitral valve regurgitation    • Undiagnosed cardiac murmurs    • Weight loss    ,   Past Surgical History:   Procedure Laterality Date   • COLONOSCOPY      Colon Polyp Removal    • FINGER SURGERY Right     tumor removed from a finger   ,   Family History   Problem Relation Age of Onset   • No Known Problems Mother    • Cancer Father    • Cancer Sister    • No Known Problems Sister    • No Known Problems Sister    • No Known Problems Sister    • No Known Problems Brother    ,   Social History     Tobacco Use   • Smoking status: Never   • Smokeless tobacco: Never   Vaping Use   • Vaping Use: Never used   Substance Use Topics   • Alcohol use: Never   • Drug use: Never   ,     Medications  No current outpatient medications on file.     No  "current facility-administered medications for this visit.       Allergies:  Patient has no known allergies.    Review of Systems  Review of Systems   HENT: Negative for nosebleeds.    Cardiovascular: Positive for dyspnea on exertion. Negative for chest pain, claudication, leg swelling, near-syncope, orthopnea, palpitations, paroxysmal nocturnal dyspnea and syncope.   Respiratory: Negative for hemoptysis and shortness of breath.    Gastrointestinal: Negative for melena.   Genitourinary: Negative for hematuria.       Objective     Physical Exam:  /78   Pulse 78   Ht 180.3 cm (71\")   Wt 51.7 kg (114 lb)   SpO2 100%   BMI 15.90 kg/m²   Pulmonary:      Effort: Pulmonary effort is normal.      Breath sounds: Normal breath sounds.   Cardiovascular:      Normal rate. Regularly irregular rhythm.      Murmurs: There is a high frequency blowing holosystolic murmur at the apex.   Edema:     Peripheral edema absent.         Results Review:    ECG 12 Lead    Date/Time: 5/15/2023 12:08 PM  Performed by: Scott Burleson MD  Authorized by: Scott Burleson MD   Comparison: compared with previous ECG   Similar to previous ECG  Rhythm: sinus rhythm  Rate: normal  Conduction: conduction normal  QRS axis: normal  Other findings: left ventricular hypertrophy with strain    Clinical impression: abnormal EKG            Office Visit on 05/03/2022   Component Date Value Ref Range Status   • WBC 05/03/2022 5.12  3.40 - 10.80 10*3/mm3 Final   • RBC 05/03/2022 4.68  4.14 - 5.80 10*6/mm3 Final   • Hemoglobin 05/03/2022 13.9  13.0 - 17.7 g/dL Final   • Hematocrit 05/03/2022 41.0  37.5 - 51.0 % Final   • MCV 05/03/2022 87.6  79.0 - 97.0 fL Final   • MCH 05/03/2022 29.7  26.6 - 33.0 pg Final   • MCHC 05/03/2022 33.9  31.5 - 35.7 g/dL Final   • RDW 05/03/2022 13.9  12.3 - 15.4 % Final   • Platelets 05/03/2022 184  140 - 450 10*3/mm3 Final   • Neutrophil Rel % 05/03/2022 50.4  42.7 - 76.0 % Final   • Lymphocyte Rel % 05/03/2022 37.7  19.6 - " 45.3 % Final   • Monocyte Rel % 05/03/2022 8.6  5.0 - 12.0 % Final   • Eosinophil Rel % 05/03/2022 2.1  0.3 - 6.2 % Final   • Basophil Rel % 05/03/2022 0.8  0.0 - 1.5 % Final   • Neutrophils Absolute 05/03/2022 2.58  1.70 - 7.00 10*3/mm3 Final   • Lymphocytes Absolute 05/03/2022 1.93  0.70 - 3.10 10*3/mm3 Final   • Monocytes Absolute 05/03/2022 0.44  0.10 - 0.90 10*3/mm3 Final   • Eosinophils Absolute 05/03/2022 0.11  0.00 - 0.40 10*3/mm3 Final   • Basophils Absolute 05/03/2022 0.04  0.00 - 0.20 10*3/mm3 Final   • Immature Granulocyte Rel % 05/03/2022 0.4  0.0 - 0.5 % Final   • Immature Grans Absolute 05/03/2022 0.02  0.00 - 0.05 10*3/mm3 Final   • nRBC 05/03/2022 0.0  0.0 - 0.2 /100 WBC Final   • Glucose 05/03/2022 99  65 - 99 mg/dL Final   • BUN 05/03/2022 20  8 - 23 mg/dL Final   • Creatinine 05/03/2022 1.32 (H)  0.76 - 1.27 mg/dL Final   • EGFR Result 05/03/2022 52.2 (L)  >60.0 mL/min/1.73 Final    Comment: National Kidney Foundation and American Society of  Nephrology (ASN) Task Force recommended calculation based  on the Chronic Kidney Disease Epidemiology Collaboration  (CKD-EPI) equation refit without adjustment for race.  The GFR formula is only valid for adults with stable renal  function between ages 18 and 70.     • BUN/Creatinine Ratio 05/03/2022 15.2  7.0 - 25.0 Final   • Sodium 05/03/2022 142  136 - 145 mmol/L Final   • Potassium 05/03/2022 4.4  3.5 - 5.2 mmol/L Final    Comment: Slight hemolysis detected by analyzer. Results may be  affected.     • Chloride 05/03/2022 105  98 - 107 mmol/L Final   • Total CO2 05/03/2022 24.9  22.0 - 29.0 mmol/L Final   • Calcium 05/03/2022 10.0  8.6 - 10.5 mg/dL Final   • Total Protein 05/03/2022 7.3  6.0 - 8.5 g/dL Final   • Albumin 05/03/2022 4.60  3.50 - 5.20 g/dL Final   • Globulin 05/03/2022 2.7  gm/dL Final   • A/G Ratio 05/03/2022 1.7  g/dL Final   • Total Bilirubin 05/03/2022 0.6  0.0 - 1.2 mg/dL Final   • Alkaline Phosphatase 05/03/2022 131 (H)  39 - 117 U/L  Final   • AST (SGOT) 05/03/2022 23  1 - 40 U/L Final   • ALT (SGPT) 05/03/2022 14  1 - 41 U/L Final   • Total Cholesterol 05/03/2022 217 (H)  0 - 200 mg/dL Final    Comment: Cholesterol Reference Ranges  (U.S. Department of Health and Human Services ATP III  Classifications)  Desirable          <200 mg/dL  Borderline High    200-239 mg/dL  High Risk          >240 mg/dL  Triglyceride Reference Ranges  (U.S. Department of Health and Human Services ATP III  Classifications)  Normal           <150 mg/dL  Borderline High  150-199 mg/dL  High             200-499 mg/dL  Very High        >500 mg/dL  HDL Reference Ranges  (U.S. Department of Health and Human Services ATP III  Classifications)  Low     <40 mg/dl (major risk factor for CHD)  High    >60 mg/dl ('negative' risk factor for CHD)  LDL Reference Ranges  (U.S. Department of Health and Human Services ATP III  Classifications)  Optimal          <100 mg/dL  Near Optimal     100-129 mg/dL  Borderline High  130-159 mg/dL  High             160-189 mg/dL  Very High        >189 mg/dL     • Triglycerides 05/03/2022 80  0 - 150 mg/dL Final   • HDL Cholesterol 05/03/2022 69 (H)  40 - 60 mg/dL Final   • VLDL Cholesterol Jaspal 05/03/2022 14  5 - 40 mg/dL Final   • LDL Chol Calc (NIH) 05/03/2022 134 (H)  0 - 100 mg/dL Final   • Chol/HDL Ratio 05/03/2022 3.14   Final       Assessment & Plan   Problem List Items Addressed This Visit        Cardiac and Vasculature    Mitral valve regurgitation    Current Assessment & Plan     Severe due to MVP. Needs a repeat echo         Mitral valve prolapse - Primary    Current Assessment & Plan     Chronic. Needs a repeat echo            Medical Complexity  must have 2 out of 3     Moderate Complexity Level 4           1 of the following medical problems:          []One chronic illness with mild exacerbation         [x]Two or more stable chronic illness          []One new problem  []One acute illness with systemic symptoms    Complexity of Data   Reviewed (1 out of the 3 following categories)      Category 1 tests, documents, historian (must have 3 points)       []Review of prior external records  [x]Review of results of unique tests  [x]Ordering unique tests  []Assessment requires an independent historian    Category 2 Interpretation of tests   []Independent interpretation of test read by another doc    Category 3 Discuss Management/tests  []Discussion with external physician    Risk of complications and/or morbidity          [x]Prescription Drug Management

## 2023-11-07 ENCOUNTER — OFFICE VISIT (OUTPATIENT)
Dept: FAMILY MEDICINE CLINIC | Facility: CLINIC | Age: 88
End: 2023-11-07
Payer: COMMERCIAL

## 2023-11-07 VITALS
DIASTOLIC BLOOD PRESSURE: 80 MMHG | BODY MASS INDEX: 15.4 KG/M2 | TEMPERATURE: 98.5 F | HEIGHT: 71 IN | SYSTOLIC BLOOD PRESSURE: 144 MMHG | HEART RATE: 74 BPM | WEIGHT: 110 LBS | OXYGEN SATURATION: 94 % | RESPIRATION RATE: 18 BRPM

## 2023-11-07 DIAGNOSIS — N40.0 BENIGN PROSTATIC HYPERPLASIA WITHOUT LOWER URINARY TRACT SYMPTOMS: Primary | ICD-10-CM

## 2023-11-07 DIAGNOSIS — R79.9 ABNORMAL FINDING OF BLOOD CHEMISTRY, UNSPECIFIED: ICD-10-CM

## 2023-11-07 PROCEDURE — G0439 PPPS, SUBSEQ VISIT: HCPCS | Performed by: FAMILY MEDICINE

## 2023-11-07 PROCEDURE — 1170F FXNL STATUS ASSESSED: CPT | Performed by: FAMILY MEDICINE

## 2023-11-07 NOTE — PROGRESS NOTES
The ABCs of the Annual Wellness Visit  Subsequent Medicare Wellness Visit    Subjective      Romna Kenney is a 88 y.o. male who presents for a Subsequent Medicare Wellness Visit.    The following portions of the patient's history were reviewed and   updated as appropriate: allergies, current medications, past family history, past medical history, past social history, past surgical history, and problem list.    Compared to one year ago, the patient feels his physical   health is the same.    Compared to one year ago, the patient feels his mental   health is the same.    Recent Hospitalizations:  He was not admitted to the hospital during the last year.       Current Medical Providers:  Patient Care Team:  Yariel Hancock MD as PCP - General (Family Medicine)  Yariel Hancock MD as PCP - Family Medicine  Joseph Cha MD as Consulting Physician (Urology)    No outpatient medications prior to visit.     No facility-administered medications prior to visit.       No opioid medication identified on active medication list. I have reviewed chart for other potential  high risk medication/s and harmful drug interactions in the elderly.        Aspirin is not on active medication list.  Aspirin use is not indicated based on review of current medical condition/s. Risk of harm outweighs potential benefits.  .    Patient Active Problem List   Diagnosis    Mitral valve regurgitation    Mitral valve prolapse    Weight loss    Patient underweight    Testicle pain    Right lower quadrant abdominal pain    Epididymitis    Herpes zoster without complication    Urinary retention    Benign prostatic hyperplasia without lower urinary tract symptoms     Advance Care Planning   Advance Care Planning     Advance Directive is not on file.  ACP discussion was held with the patient during this visit. Patient does not have an advance directive, information provided.     Objective    Vitals:    11/07/23 0925   BP:  "144/80   Pulse: 74   Resp: 18   Temp: 98.5 °F (36.9 °C)   SpO2: 94%   Weight: 49.9 kg (110 lb)   Height: 180.3 cm (70.98\")     Estimated body mass index is 15.35 kg/m² as calculated from the following:    Height as of this encounter: 180.3 cm (70.98\").    Weight as of this encounter: 49.9 kg (110 lb).    BMI is below normal parameters (malnutrition). Recommendations: treating the underlying disease process      Does the patient have evidence of cognitive impairment?   No            HEALTH RISK ASSESSMENT    Smoking Status:  Social History     Tobacco Use   Smoking Status Never   Smokeless Tobacco Never     Alcohol Consumption:  Social History     Substance and Sexual Activity   Alcohol Use Never     Fall Risk Screen:    MARISABEL Fall Risk Assessment was completed, and patient is at LOW risk for falls.Assessment completed on:2023    Depression Screenin/7/2023     9:26 AM   PHQ-2/PHQ-9 Depression Screening   Little Interest or Pleasure in Doing Things 0-->not at all   Feeling Down, Depressed or Hopeless 0-->not at all   PHQ-9: Brief Depression Severity Measure Score 0       Health Habits and Functional and Cognitive Screenin/7/2023     9:00 AM   Functional & Cognitive Status   Do you have difficulty preparing food and eating? No   Do you have difficulty bathing yourself, getting dressed or grooming yourself? No   Do you have difficulty using the toilet? No   Do you have difficulty moving around from place to place? No   Do you have trouble with steps or getting out of a bed or a chair? No   Current Diet Well Balanced Diet   Dental Exam Up to date   Eye Exam Up to date   Exercise (times per week) 5 times per week   Current Exercises Include Walking   Do you need help using the phone?  No   Are you deaf or do you have serious difficulty hearing?  No   Do you need help to go to places out of walking distance? No   Do you need help shopping? No   Do you need help preparing meals?  No   Do you need " help with housework?  No   Do you need help with laundry? No   Do you need help taking your medications? No   Do you need help managing money? No   Do you ever drive or ride in a car without wearing a seat belt? No   Have you felt unusual stress, anger or loneliness in the last month? No   Who do you live with? Spouse   If you need help, do you have trouble finding someone available to you? No   Have you been bothered in the last four weeks by sexual problems? No   Do you have difficulty concentrating, remembering or making decisions? No       Age-appropriate Screening Schedule:  Refer to the list below for future screening recommendations based on patient's age, sex and/or medical conditions. Orders for these recommended tests are listed in the plan section. The patient has been provided with a written plan.    Health Maintenance   Topic Date Due    COVID-19 Vaccine (1) Never done    TDAP/TD VACCINES (1 - Tdap) Never done    ZOSTER VACCINE (1 of 2) Never done    Pneumococcal Vaccine 65+ (1 - PCV) Never done    ANNUAL WELLNESS VISIT  11/03/2023    INFLUENZA VACCINE  03/31/2024 (Originally 8/1/2023)    BMI FOLLOWUP  11/07/2024                  CMS Preventative Services Quick Reference  Risk Factors Identified During Encounter:    Fall Risk-High or Moderate: Discussed Fall Prevention in the home    The above risks/problems have been discussed with the patient.  Pertinent information has been shared with the patient in the After Visit Summary.    Diagnoses and all orders for this visit:    1. Benign prostatic hyperplasia without lower urinary tract symptoms (Primary)  -     CBC & Differential  -     Comprehensive metabolic panel  -     Lipid Panel With / Chol / HDL Ratio    2. Abnormal finding of blood chemistry, unspecified  -     CBC & Differential  -     Lipid Panel With / Chol / HDL Ratio        Follow Up:   Next Medicare Wellness visit to be scheduled in 1 year.      An After Visit Summary and PPPS were made  available to the patient.

## 2023-11-07 NOTE — PROGRESS NOTES
"Subjective   Roman Kenney is a 88 y.o. male.     Chief Complaint   Patient presents with    Medicare Wellness-subsequent       History of Present Illness     He thinks his urination is ok at home    No current outpatient medications on file.  No Known Allergies    BMI is below normal parameters (malnutrition). Recommendations: treating the underlying disease process      Facility age limit for growth %talisha is 20 years.    Past Medical History:   Diagnosis Date    BPH (benign prostatic hypertrophy)     Epididymitis     Hypertension     Hypertension, essential     Mitral valve regurgitation     Undiagnosed cardiac murmurs     Weight loss      Past Surgical History:   Procedure Laterality Date    COLONOSCOPY      Colon Polyp Removal     FINGER SURGERY Right     tumor removed from a finger       Review of Systems   Constitutional: Negative.    HENT: Negative.     Eyes: Negative.    Respiratory: Negative.     Cardiovascular: Negative.    Gastrointestinal: Negative.    Endocrine: Negative.    Genitourinary: Negative.    Musculoskeletal: Negative.    Skin: Negative.    Allergic/Immunologic: Negative.    Neurological: Negative.    Hematological: Negative.    Psychiatric/Behavioral: Negative.         Objective /80   Pulse 74   Temp 98.5 °F (36.9 °C)   Resp 18   Ht 180.3 cm (70.98\")   Wt 49.9 kg (110 lb)   SpO2 94%   BMI 15.35 kg/m²    Physical Exam  Vitals and nursing note reviewed.   Constitutional:       Appearance: Normal appearance. He is normal weight.   HENT:      Head: Normocephalic and atraumatic.      Nose: Nose normal.      Mouth/Throat:      Mouth: Mucous membranes are moist.   Eyes:      Extraocular Movements: Extraocular movements intact.      Pupils: Pupils are equal, round, and reactive to light.   Cardiovascular:      Rate and Rhythm: Normal rate and regular rhythm.      Pulses: Normal pulses.      Heart sounds: Normal heart sounds. Murmur heard.   Pulmonary:      Effort: Pulmonary effort " is normal.   Abdominal:      General: Abdomen is flat. Bowel sounds are normal.   Musculoskeletal:         General: Normal range of motion.      Cervical back: Normal range of motion and neck supple.   Skin:     General: Skin is warm.      Capillary Refill: Capillary refill takes less than 2 seconds.   Neurological:      General: No focal deficit present.      Mental Status: He is alert and oriented to person, place, and time. Mental status is at baseline.   Psychiatric:         Mood and Affect: Mood normal.         Assessment & Plan   Diagnoses and all orders for this visit:    1. Benign prostatic hyperplasia without lower urinary tract symptoms (Primary)  -     CBC & Differential  -     Comprehensive metabolic panel  -     Lipid Panel With / Chol / HDL Ratio    2. Abnormal finding of blood chemistry, unspecified  -     CBC & Differential  -     Lipid Panel With / Chol / HDL Ratio                 Orders Placed This Encounter   Procedures    Comprehensive metabolic panel     Order Specific Question:   Release to patient     Answer:   Routine Release [[id number]]    Lipid Panel With / Chol / HDL Ratio     Order Specific Question:   Release to patient     Answer:   Routine Release [[id number]]    CBC & Differential     Order Specific Question:   Release to patient     Answer:   Routine Release [[id number]]       Follow up: 6 month(s)

## 2023-11-08 LAB
ALBUMIN SERPL-MCNC: 4.6 G/DL (ref 3.5–5.2)
ALBUMIN/GLOB SERPL: 1.8 G/DL
ALP SERPL-CCNC: 103 U/L (ref 39–117)
ALT SERPL-CCNC: 16 U/L (ref 1–41)
AST SERPL-CCNC: 23 U/L (ref 1–40)
BASOPHILS # BLD AUTO: 0.04 10*3/MM3 (ref 0–0.2)
BASOPHILS NFR BLD AUTO: 0.8 % (ref 0–1.5)
BILIRUB SERPL-MCNC: 0.7 MG/DL (ref 0–1.2)
BUN SERPL-MCNC: 19 MG/DL (ref 8–23)
BUN/CREAT SERPL: 14.5 (ref 7–25)
CALCIUM SERPL-MCNC: 9.9 MG/DL (ref 8.6–10.5)
CHLORIDE SERPL-SCNC: 105 MMOL/L (ref 98–107)
CHOLEST SERPL-MCNC: 185 MG/DL (ref 0–200)
CHOLEST/HDLC SERPL: 2.53 {RATIO}
CO2 SERPL-SCNC: 30.3 MMOL/L (ref 22–29)
CREAT SERPL-MCNC: 1.31 MG/DL (ref 0.76–1.27)
EGFRCR SERPLBLD CKD-EPI 2021: 52.4 ML/MIN/1.73
EOSINOPHIL # BLD AUTO: 0.1 10*3/MM3 (ref 0–0.4)
EOSINOPHIL NFR BLD AUTO: 2.1 % (ref 0.3–6.2)
ERYTHROCYTE [DISTWIDTH] IN BLOOD BY AUTOMATED COUNT: 13.3 % (ref 12.3–15.4)
GLOBULIN SER CALC-MCNC: 2.5 GM/DL
GLUCOSE SERPL-MCNC: 99 MG/DL (ref 65–99)
HCT VFR BLD AUTO: 38.8 % (ref 37.5–51)
HDLC SERPL-MCNC: 73 MG/DL (ref 40–60)
HGB BLD-MCNC: 12.5 G/DL (ref 13–17.7)
IMM GRANULOCYTES # BLD AUTO: 0.01 10*3/MM3 (ref 0–0.05)
IMM GRANULOCYTES NFR BLD AUTO: 0.2 % (ref 0–0.5)
LDLC SERPL CALC-MCNC: 102 MG/DL (ref 0–100)
LYMPHOCYTES # BLD AUTO: 1.86 10*3/MM3 (ref 0.7–3.1)
LYMPHOCYTES NFR BLD AUTO: 39.3 % (ref 19.6–45.3)
MCH RBC QN AUTO: 29.8 PG (ref 26.6–33)
MCHC RBC AUTO-ENTMCNC: 32.2 G/DL (ref 31.5–35.7)
MCV RBC AUTO: 92.6 FL (ref 79–97)
MONOCYTES # BLD AUTO: 0.38 10*3/MM3 (ref 0.1–0.9)
MONOCYTES NFR BLD AUTO: 8 % (ref 5–12)
NEUTROPHILS # BLD AUTO: 2.34 10*3/MM3 (ref 1.7–7)
NEUTROPHILS NFR BLD AUTO: 49.6 % (ref 42.7–76)
NRBC BLD AUTO-RTO: 0 /100 WBC (ref 0–0.2)
PLATELET # BLD AUTO: 170 10*3/MM3 (ref 140–450)
POTASSIUM SERPL-SCNC: 4.9 MMOL/L (ref 3.5–5.2)
PROT SERPL-MCNC: 7.1 G/DL (ref 6–8.5)
RBC # BLD AUTO: 4.19 10*6/MM3 (ref 4.14–5.8)
SODIUM SERPL-SCNC: 143 MMOL/L (ref 136–145)
TRIGL SERPL-MCNC: 53 MG/DL (ref 0–150)
VLDLC SERPL CALC-MCNC: 10 MG/DL (ref 5–40)
WBC # BLD AUTO: 4.73 10*3/MM3 (ref 3.4–10.8)

## 2023-12-18 ENCOUNTER — TELEPHONE (OUTPATIENT)
Dept: FAMILY MEDICINE CLINIC | Facility: CLINIC | Age: 88
End: 2023-12-18

## 2023-12-18 NOTE — TELEPHONE ENCOUNTER
[facility] staff attempted to follow warm transfer process and was unsuccessful     Caller: Arlene Kenney    Relationship to patient: Self    Best call back number: [phone]      Patient is needing: PATIENT HAS ARRIVED FOR COVID TEST

## 2023-12-19 ENCOUNTER — OFFICE VISIT (OUTPATIENT)
Dept: FAMILY MEDICINE CLINIC | Facility: CLINIC | Age: 88
End: 2023-12-19
Payer: COMMERCIAL

## 2023-12-19 VITALS
DIASTOLIC BLOOD PRESSURE: 72 MMHG | RESPIRATION RATE: 18 BRPM | WEIGHT: 111.6 LBS | SYSTOLIC BLOOD PRESSURE: 120 MMHG | TEMPERATURE: 96.9 F | BODY MASS INDEX: 15.62 KG/M2 | OXYGEN SATURATION: 98 % | HEART RATE: 147 BPM | HEIGHT: 71 IN

## 2023-12-19 DIAGNOSIS — U07.1 COVID: ICD-10-CM

## 2023-12-19 DIAGNOSIS — Z20.822 EXPOSURE TO COVID-19 VIRUS: Primary | ICD-10-CM

## 2023-12-19 LAB
EXPIRATION DATE: ABNORMAL
FLUAV AG UPPER RESP QL IA.RAPID: NOT DETECTED
FLUBV AG UPPER RESP QL IA.RAPID: NOT DETECTED
INTERNAL CONTROL: ABNORMAL
Lab: ABNORMAL
SARS-COV-2 AG UPPER RESP QL IA.RAPID: DETECTED

## 2023-12-19 NOTE — PROGRESS NOTES
"Subjective   Chief Complaint:  Cough and nasal congestion    History of Present Illness:  This 88 y.o. male was seen in the office today. The patient presents today with cough and nasal congestion starting Saturday, 12/16/2022. He reports exposure to COVID-19 from family the week before. He reports no fevers but is experiencing quite a bit of fatigue. He reports quite a bit of nasal congestion.     Review of Systems    No Known Allergies   No current outpatient medications on file prior to visit.     No current facility-administered medications on file prior to visit.      Past Medical, Surgical, Social, and Family History:  Past Medical History:   Diagnosis Date    BPH (benign prostatic hypertrophy)     Epididymitis     Hypertension     Hypertension, essential     Mitral valve regurgitation     Undiagnosed cardiac murmurs     Weight loss      Past Surgical History:   Procedure Laterality Date    COLONOSCOPY      Colon Polyp Removal     FINGER SURGERY Right     tumor removed from a finger     Social History     Socioeconomic History    Marital status:    Tobacco Use    Smoking status: Never    Smokeless tobacco: Never   Vaping Use    Vaping Use: Never used   Substance and Sexual Activity    Alcohol use: Never    Drug use: Never    Sexual activity: Defer     Family History   Problem Relation Age of Onset    No Known Problems Mother     Cancer Father     Cancer Sister     No Known Problems Sister     No Known Problems Sister     No Known Problems Sister     No Known Problems Brother        Prior Visit Notes/Records, Lab, Imaging, and Diagnostic Results Reviewed:  A1C:No results for input(s): \"HGBA1C\" in the last 68755 hours.  GLUCOSE:  Lab Results - Last 18 Months   Lab Units 11/07/23  0908   GLUCOSE mg/dL 99     LIPID:  Lab Results - Last 18 Months   Lab Units 11/07/23  0908   CHOLESTEROL mg/dL 185   LDL CHOL mg/dL 102*   HDL CHOL mg/dL 73*   TRIGLYCERIDES mg/dL 53     PSA:No results for input(s): \"PSA\" in the " "last 69484 hours.  CBC:  Lab Results - Last 18 Months   Lab Units 11/07/23  0908   WBC 10*3/mm3 4.73   HEMOGLOBIN g/dL 12.5*   HEMATOCRIT % 38.8   PLATELETS 10*3/mm3 170      BMP/CMP:  Lab Results - Last 18 Months   Lab Units 11/07/23  0908   SODIUM mmol/L 143   POTASSIUM mmol/L 4.9   CHLORIDE mmol/L 105   CO2 mmol/L 30.3*   GLUCOSE mg/dL 99   BUN mg/dL 19   CREATININE mg/dL 1.31*   EGFR RESULT mL/min/1.73 52.4*   CALCIUM mg/dL 9.9     HEPATIC:  Lab Results - Last 18 Months   Lab Units 11/07/23  0908   ALT (SGPT) U/L 16   AST (SGOT) U/L 23   ALK PHOS U/L 103     Vit D:No results for input(s): \"GPLV70YI\" in the last 05889 hours.  THYROID:No results for input(s): \"TSH\", \"FREET4\", \"FTI\" in the last 45093 hours.    Invalid input(s): \"FREET3\", \"T3\", \"T4\", \"TEUP\", \"TOTALT4\"  BMI Trend:  BMI Readings from Last 10 Encounters:   12/19/23 15.57 kg/m²   11/07/23 15.35 kg/m²   05/15/23 15.90 kg/m²   11/09/22 16.54 kg/m²   11/03/22 16.04 kg/m²   05/03/22 17.02 kg/m²   11/16/21 16.85 kg/m²   11/02/21 17.03 kg/m²   05/18/21 17.32 kg/m²   11/25/20 18.06 kg/m²     Objective   Vital Signs  /72 (BP Location: Left arm, Patient Position: Sitting, Cuff Size: Adult)   Pulse (!) 147   Temp 96.9 °F (36.1 °C) (Infrared)   Resp 18   Ht 180.3 cm (70.98\")   Wt 50.6 kg (111 lb 9.6 oz)   SpO2 98%   BMI 15.57 kg/m²   Physical Exam  Constitutional:       General: He is not in acute distress.     Comments: Tired appearing, otherwise no acute distress.   HENT:      Nose:      Comments: Nasal congestion present. Rhinorrhea present.     Mouth/Throat:      Comments: No throat erythema present.  Cardiovascular:      Rate and Rhythm: Normal rate and regular rhythm.      Pulses: Normal pulses.      Heart sounds: No murmur heard.     No friction rub. No gallop.   Pulmonary:      Effort: Pulmonary effort is normal. No respiratory distress.      Breath sounds: Normal breath sounds. No wheezing or rhonchi.   Neurological:      Mental Status: He is " alert.       Assessment & Plan   Diagnoses and all orders for this visit:    1. Exposure to COVID-19 virus (Primary)  -     POCT SARS-CoV-2 Antigen DEANDRE + Flu    2. COVID    Other orders  -     Nirmatrelvir&Ritonavir 150/100 (PAXLOVID) 10 x 150 MG & 10 x 100MG tablet therapy pack tablet (for renal adjustment); Take 2 tablets by mouth 2 (Two) Times a Day for 5 days.  Dispense: 20 tablet; Refill: 0    Discussions & Anticipatory Guidance:  Advised and educated plan of care.    The patient will Return for follow-up as needed.   Advised precautions, antivirals to follow. Renal dosing considered with dosing antivirals.    Electronically signed by HERACLIO Armendariz 12/19/23, 11:20 AM CST.

## 2023-12-19 NOTE — PATIENT INSTRUCTIONS
Covid-19 Isolation Guidelines  Positive COVID-19 test with Symptoms    Isolate 10 Days From the Date SYMPTOMS Began    1.  If symptoms fully resolve, isolation may be shortened and after day 5 on the first day without symptoms.  2.  Wear well fitting facemask for 10 full days since the start of symptoms.  Isolation should not be shortened if a mask cannot be worn properly and consistently.    Per January 3, 2022 Guidelines [id number]

## 2024-05-20 ENCOUNTER — OFFICE VISIT (OUTPATIENT)
Dept: CARDIOLOGY | Facility: CLINIC | Age: 89
End: 2024-05-20
Payer: MEDICARE

## 2024-05-20 VITALS
SYSTOLIC BLOOD PRESSURE: 140 MMHG | HEART RATE: 93 BPM | BODY MASS INDEX: 15.4 KG/M2 | DIASTOLIC BLOOD PRESSURE: 80 MMHG | OXYGEN SATURATION: 98 % | HEIGHT: 71 IN | WEIGHT: 110 LBS

## 2024-05-20 DIAGNOSIS — I34.0 NONRHEUMATIC MITRAL VALVE REGURGITATION: Primary | ICD-10-CM

## 2024-05-20 PROCEDURE — 1159F MED LIST DOCD IN RCRD: CPT | Performed by: INTERNAL MEDICINE

## 2024-05-20 PROCEDURE — 93000 ELECTROCARDIOGRAM COMPLETE: CPT | Performed by: INTERNAL MEDICINE

## 2024-05-20 PROCEDURE — 99213 OFFICE O/P EST LOW 20 MIN: CPT | Performed by: INTERNAL MEDICINE

## 2024-05-20 PROCEDURE — 1160F RVW MEDS BY RX/DR IN RCRD: CPT | Performed by: INTERNAL MEDICINE

## 2024-05-20 NOTE — PROGRESS NOTES
Subjective:     Encounter Date:05/20/2024      Patient ID: Roman Kenney is a 89 y.o. male with severe native mitral valve regurgitation secondary to mitral valve prolapse, presenting today for follow-up.    Chief Complaint: Here for follow-up of mitral valve regurgitation    History of Present Illness    This patient presents today for follow-up of mitral valve regurgitation.  He is known to have mitral valve prolapse with severe mitral valve regurgitation.  He has been stable for a number of years.  He has not followed through with obtaining serial echocardiograms as requested by his former cardiologist Dr. Burleson, however.  The patient says that he is stable.  He has some gait instability and some balance issues but describes no recent falls.  He denies having significant shortness of breath or dyspnea with exertion.  He denies having orthopnea, PND, edema.  He also denies having any palpitations, lightheadedness, dizziness or syncope.  He says that overall he is feeling very well for his age.  He is not on any current medications and notes that blood pressure is generally well-controlled when checked.    No current outpatient medications on file.    No Known Allergies    Social History     Tobacco Use    Smoking status: Never    Smokeless tobacco: Never   Substance Use Topics    Alcohol use: Never     Review of Systems   Constitutional: Negative for fever, malaise/fatigue and weight loss.   Cardiovascular:  Negative for chest pain, dyspnea on exertion, leg swelling, orthopnea, palpitations, paroxysmal nocturnal dyspnea and syncope.   Respiratory:  Negative for cough, shortness of breath and wheezing.    Gastrointestinal:  Negative for abdominal pain, nausea and vomiting.   Neurological:  Positive for loss of balance. Negative for dizziness, light-headedness and weakness.         ECG 12 Lead    Date/Time: 5/20/2024 11:25 AM  Performed by: Joseph Lafleur MD    Authorized by: Joseph Lafleur  "MD Nilesh  Comparison: compared with previous ECG from 5/15/2023  Similar to previous ECG  Rhythm: sinus rhythm  Rate: normal  BPM: 82  Conduction: conduction normal  QRS axis: normal  Other findings: left ventricular hypertrophy and poor R wave progression    Clinical impression: abnormal EKG           Objective:     Vitals reviewed.   Constitutional:       General: Not in acute distress.     Appearance: Well-developed and not in distress.   Eyes:      Extraocular Movements: Extraocular movements intact.   HENT:      Head: Normocephalic and atraumatic.   Pulmonary:      Effort: Pulmonary effort is normal.      Breath sounds: Normal breath sounds. No wheezing. No rhonchi. No rales.   Cardiovascular:      Normal rate. Regular rhythm.      Murmurs: There is a grade 3/6 high frequency blowing holosystolic murmur at the apex.      No gallop.    Pulses:     Intact distal pulses.   Edema:     Peripheral edema absent.   Abdominal:      General: Bowel sounds are normal. There is no distension.      Palpations: Abdomen is soft.      Tenderness: There is no abdominal tenderness.   Skin:     General: Skin is warm and dry. There is no cyanosis.      Findings: No erythema or rash.   Neurological:      Mental Status: Alert and oriented to person, place, and time.      Cranial Nerves: No cranial nerve deficit.       /80   Pulse 93   Ht 180.3 cm (71\")   Wt 49.9 kg (110 lb)   SpO2 98%   BMI 15.34 kg/m²     Data/Lab Review:     Results for orders placed during the hospital encounter of 04/01/20    Adult Transthoracic Echo Complete W/ Cont if Necessary Per Protocol    Interpretation Summary  · There is severe prolapse of the posterior mitral valve leaflet present, resulting in severe, anteriorly directed mitral regurgitation.  · Normal LVEF, though EF now <60%.  · Left ventricular diastolic dysfunction (grade III) consistent with reversible restrictive pattern.  · Left atrial cavity size is severely dilated.  · Normal size " and function of right ventricle.  · Estimated RVSP, by TR jet visualized (may not fully reflect true value) is 35-45mmHg.        Assessment:          Diagnosis Plan   1. Nonrheumatic mitral valve regurgitation  ECG 12 Lead             Plan:       1.  Nonrheumatic mitral valve regurgitation: The patient is clinically stable at this time.  He does have a significant murmur consistent with known severe mitral valve regurgitation.  Given his advanced age, mitral valve replacement by surgical means is not an option.  [id number] repair percutaneously would be an option, however the patient has not been able to keep follow-up appointments for prior echocardiograms.  He is clinically stable, thus I do not feel that he is in need of any further procedures or testing at this particular time.  We will plan to see him back in 1 year at which point we could consider a repeat echocardiogram if the patient will follow through with this.    I will plan to see the patient again in 1 year to reevaluate, however I have discussed with him that if he develops significant shortness of breath, dyspnea with exertion, edema, etc., we can certainly see him back at any point in time.

## 2024-06-17 ENCOUNTER — TELEPHONE (OUTPATIENT)
Dept: FAMILY MEDICINE CLINIC | Facility: CLINIC | Age: 89
End: 2024-06-17
Payer: MEDICARE

## 2024-06-17 NOTE — TELEPHONE ENCOUNTER
Called pt regarding appointment with Dr. Hancock as his last day is June 26th. Pt will need to reschedule with a different provider. Left voicemail.- Hub to relay

## 2025-05-08 ENCOUNTER — OFFICE VISIT (OUTPATIENT)
Dept: FAMILY MEDICINE CLINIC | Facility: CLINIC | Age: OVER 89
End: 2025-05-08
Payer: MEDICARE

## 2025-05-08 VITALS
BODY MASS INDEX: 15.82 KG/M2 | SYSTOLIC BLOOD PRESSURE: 144 MMHG | HEART RATE: 79 BPM | OXYGEN SATURATION: 98 % | WEIGHT: 113 LBS | DIASTOLIC BLOOD PRESSURE: 80 MMHG | HEIGHT: 71 IN

## 2025-05-08 DIAGNOSIS — I34.0 NONRHEUMATIC MITRAL VALVE REGURGITATION: Primary | ICD-10-CM

## 2025-05-08 DIAGNOSIS — Z13.220 ENCOUNTER FOR LIPID SCREENING FOR CARDIOVASCULAR DISEASE: ICD-10-CM

## 2025-05-08 DIAGNOSIS — R63.4 WEIGHT LOSS: ICD-10-CM

## 2025-05-08 DIAGNOSIS — Z13.6 ENCOUNTER FOR LIPID SCREENING FOR CARDIOVASCULAR DISEASE: ICD-10-CM

## 2025-05-08 PROBLEM — N50.819 TESTICLE PAIN: Status: RESOLVED | Noted: 2019-07-03 | Resolved: 2025-05-08

## 2025-05-08 PROBLEM — R10.31 RIGHT LOWER QUADRANT ABDOMINAL PAIN: Status: RESOLVED | Noted: 2019-07-03 | Resolved: 2025-05-08

## 2025-05-08 NOTE — PROGRESS NOTES
"Chief Complaint  Establish Care    Subjective      Roman Kenney presents to [facility]  History of Present Illness  The patient is a 89-year-old male here for follow-up.    He has a known history of mitral valve prolapse and nonrheumatic mitral valve regurgitation, which are being monitored by his cardiologist, Dr. Lafleur. He reports no current cardiac symptoms and maintains adequate mobility for his age. His respiratory function is satisfactory.    He has been off all medications for the past 2 years. His appetite has improved, although he has not experienced any significant weight gain. He attributes this to his dietary habits and the need to consume food slowly due to dental issues. He reports no recent falls but acknowledges the need for caution in his movements. He occasionally uses a cane for support. He does not experience fatigue or weakness. He has a long-standing history of weight stability.      Objective   Vital Signs:  /80   Pulse 79   Ht 180.3 cm (71\")   Wt 51.3 kg (113 lb)   SpO2 98%   BMI 15.76 kg/m²   Estimated body mass index is 15.76 kg/m² as calculated from the following:    Height as of this encounter: 180.3 cm (71\").    Weight as of this encounter: 51.3 kg (113 lb).        Physical Exam  Constitutional:       General: He is not in acute distress.     Appearance: He is not ill-appearing.   Cardiovascular:      Rate and Rhythm: Normal rate and regular rhythm.      Heart sounds: Normal heart sounds. No murmur heard.     No friction rub. No gallop.   Pulmonary:      Effort: Pulmonary effort is normal. No respiratory distress.      Breath sounds: Normal breath sounds. No wheezing, rhonchi or rales.   Abdominal:      General: Abdomen is flat. Bowel sounds are normal. There is no distension.      Tenderness: There is no abdominal tenderness.   Skin:     General: Skin is warm and dry.          Physical Exam        Result Review :  The following " labs/imaging/notes were reviewed and discussed with the patient by Jerald Pickett MD on 05/08/2025:     Latest Reference Range & Units 11/07/23 09:08   Sodium 136 - 145 mmol/L 143   Potassium 3.5 - 5.2 mmol/L 4.9   Chloride 98 - 107 mmol/L 105   CO2 22.0 - 29.0 mmol/L 30.3 (H)   BUN 8 - 23 mg/dL 19   Creatinine 0.76 - 1.27 mg/dL 1.31 (H)   BUN/Creatinine Ratio 7.0 - 25.0  14.5   EGFR Result >60.0 mL/min/1.73 52.4 (L)   Glucose 65 - 99 mg/dL 99   Calcium 8.6 - 10.5 mg/dL 9.9   Alkaline Phosphatase 39 - 117 U/L 103   Total Protein 6.0 - 8.5 g/dL 7.1   Albumin 3.5 - 5.2 g/dL 4.6   A/G Ratio g/dL 1.8   AST (SGOT) 1 - 40 U/L 23   ALT (SGPT) 1 - 41 U/L 16   Total Bilirubin 0.0 - 1.2 mg/dL 0.7   Total Cholesterol 0 - 200 mg/dL 185   HDL Cholesterol 40 - 60 mg/dL 73 (H)   LDL Cholesterol  0 - 100 mg/dL 102 (H)   Triglycerides 0 - 150 mg/dL 53   Chol/HDL Ratio  2.53   VLDL Cholesterol Jaspal 5 - 40 mg/dL 10   Globulin gm/dL 2.5   WBC 3.40 - 10.80 10*3/mm3 4.73   RBC 4.14 - 5.80 10*6/mm3 4.19   Hemoglobin 13.0 - 17.7 g/dL 12.5 (L)   Hematocrit 37.5 - 51.0 % 38.8   Platelets 140 - 450 10*3/mm3 170   RDW 12.3 - 15.4 % 13.3   MCV 79.0 - 97.0 fL 92.6   MCH 26.6 - 33.0 pg 29.8   MCHC 31.5 - 35.7 g/dL 32.2   Neutrophil Rel % 42.7 - 76.0 % 49.6   Lymphocyte Rel % 19.6 - 45.3 % 39.3   Monocyte Rel % 5.0 - 12.0 % 8.0   Eosinophil Rel % 0.3 - 6.2 % 2.1   Basophil Rel % 0.0 - 1.5 % 0.8   Immature Granulocyte Rel % 0.0 - 0.5 % 0.2   Neutrophils Absolute 1.70 - 7.00 10*3/mm3 2.34   Lymphocytes Absolute 0.70 - 3.10 10*3/mm3 1.86   Monocytes Absolute 0.10 - 0.90 10*3/mm3 0.38   Eosinophils Absolute 0.00 - 0.40 10*3/mm3 0.10   Basophils Absolute 0.00 - 0.20 10*3/mm3 0.04   Immature Grans, Absolute 0.00 - 0.05 10*3/mm3 0.01   nRBC 0.0 - 0.2 /100 WBC 0.0   (H): Data is abnormally high  (L): Data is abnormally low      Cardiology note from 5/20/24:  1.  Nonrheumatic mitral valve regurgitation: The patient is clinically stable at this time.  He does  have a significant murmur consistent with known severe mitral valve regurgitation.  Given his advanced age, mitral valve replacement by surgical means is not an option.  [id number] repair percutaneously would be an option, however the patient has not been able to keep follow-up appointments for prior echocardiograms.  He is clinically stable, thus I do not feel that he is in need of any further procedures or testing at this particular time.  We will plan to see him back in 1 year at which point we could consider a repeat echocardiogram if the patient will follow through with this.     I will plan to see the patient again in 1 year to reevaluate, however I have discussed with him that if he develops significant shortness of breath, dyspnea with exertion, edema, etc., we can certainly see him back at any point in time    Assessment      Diagnoses and all orders for this visit:    1. Nonrheumatic mitral valve regurgitation (Primary)  -     CBC & Differential  -     Comprehensive Metabolic Panel  -     Magnesium  -     Vitamin B12 & Folate  -     TSH Rfx On Abnormal To Free T4  -     Lipid Panel    2. Weight loss  -     CBC & Differential  -     Comprehensive Metabolic Panel  -     Magnesium  -     Vitamin B12 & Folate  -     TSH Rfx On Abnormal To Free T4  -     Lipid Panel    3. Encounter for lipid screening for cardiovascular disease  -     Lipid Panel             Assessment & Plan  1. Mitral valve prolapse.  - History of mitral valve prolapse with regurgitation.  - No current pain or symptoms reported; breathing is okay.  - Cardiologist advised to return if experiencing pain; condition will be monitored.  - No procedure desired due to age; reasonable decision.    2. Weight loss.  - Reports eating more but not gaining weight.  - Physical exam reveals stable weight around 113 lbs.  - Blood work ordered to assess blood count, electrolytes, kidney function, liver function, protein levels, cholesterol levels, and  TSH.  - Advised to continue monitoring weight and report any significant changes.    Follow-up  - Follow-up appointment scheduled in 6 months.    Orders Placed This Encounter   Procedures    Comprehensive Metabolic Panel     Release to patient:   Routine Release [[id number]]    Magnesium     Release to patient:   Routine Release [[id number]]    Vitamin B12 & Folate     Release to patient:   Routine Release [[id number]]    TSH Rfx On Abnormal To Free T4     Release to patient:   Routine Release [[id number]]    Lipid Panel     Release to patient:   Routine Release [[id number]]    CBC & Differential     Manual Differential:   No     Release to patient:   Routine Release [[id number]]       Follow Up   Return in about 6 months (around 11/8/2025) for Medicare Wellness, Mitral valve prolapse. .  Patient was given instructions and counseling regarding his condition or for health maintenance advice. Please see specific information pulled into the AVS if appropriate.         Patient or patient representative verbalized consent for the use of Ambient Listening during the visit with  Jerald Pickett MD for chart documentation. 5/8/2025  09:44 CDT